# Patient Record
Sex: FEMALE | Race: WHITE | NOT HISPANIC OR LATINO | Employment: OTHER | URBAN - METROPOLITAN AREA
[De-identification: names, ages, dates, MRNs, and addresses within clinical notes are randomized per-mention and may not be internally consistent; named-entity substitution may affect disease eponyms.]

---

## 2017-08-15 ENCOUNTER — HOSPITAL ENCOUNTER (OUTPATIENT)
Dept: RADIOLOGY | Facility: HOSPITAL | Age: 77
Discharge: HOME | End: 2017-08-15
Attending: INTERNAL MEDICINE
Payer: COMMERCIAL

## 2017-08-15 ENCOUNTER — TRANSCRIBE ORDERS (OUTPATIENT)
Dept: ADMINISTRATIVE | Facility: HOSPITAL | Age: 77
End: 2017-08-15

## 2017-08-15 DIAGNOSIS — T14.90XA TRAUMA: Primary | ICD-10-CM

## 2017-08-15 DIAGNOSIS — T14.90XA TRAUMA: ICD-10-CM

## 2017-08-15 PROCEDURE — 70140 X-RAY EXAM OF FACIAL BONES: CPT

## 2017-08-15 PROCEDURE — 70210 X-RAY EXAM OF SINUSES: CPT

## 2021-08-22 ENCOUNTER — HOSPITAL ENCOUNTER (EMERGENCY)
Facility: HOSPITAL | Age: 81
Discharge: HOME | End: 2021-08-22
Attending: EMERGENCY MEDICINE | Admitting: EMERGENCY MEDICINE
Payer: COMMERCIAL

## 2021-08-22 VITALS
HEART RATE: 123 BPM | WEIGHT: 123 LBS | RESPIRATION RATE: 20 BRPM | DIASTOLIC BLOOD PRESSURE: 100 MMHG | OXYGEN SATURATION: 96 % | TEMPERATURE: 97.5 F | SYSTOLIC BLOOD PRESSURE: 142 MMHG

## 2021-08-22 DIAGNOSIS — S31.801A LACERATION OF BUTTOCK: Primary | ICD-10-CM

## 2021-08-22 PROCEDURE — 90715 TDAP VACCINE 7 YRS/> IM: CPT | Performed by: EMERGENCY MEDICINE

## 2021-08-22 PROCEDURE — 99283 EMERGENCY DEPT VISIT LOW MDM: CPT

## 2021-08-22 PROCEDURE — 12001 RPR S/N/AX/GEN/TRNK 2.5CM/<: CPT | Performed by: EMERGENCY MEDICINE

## 2021-08-22 PROCEDURE — 90471 IMMUNIZATION ADMIN: CPT

## 2021-08-22 PROCEDURE — 99284 EMERGENCY DEPT VISIT MOD MDM: CPT | Performed by: EMERGENCY MEDICINE

## 2021-08-22 RX ORDER — LIDOCAINE HYDROCHLORIDE 10 MG/ML
5 INJECTION, SOLUTION EPIDURAL; INFILTRATION; INTRACAUDAL; PERINEURAL ONCE
Status: COMPLETED | OUTPATIENT
Start: 2021-08-22 | End: 2021-08-22

## 2021-08-22 RX ORDER — DILTIAZEM HYDROCHLORIDE 180 MG/1
180 CAPSULE, EXTENDED RELEASE ORAL DAILY
COMMUNITY

## 2021-08-22 RX ORDER — METOPROLOL TARTRATE 50 MG/1
50 TABLET, FILM COATED ORAL EVERY 12 HOURS SCHEDULED
COMMUNITY

## 2021-08-22 RX ORDER — ATORVASTATIN CALCIUM 10 MG/1
10 TABLET, FILM COATED ORAL DAILY
COMMUNITY

## 2021-08-22 RX ADMIN — LIDOCAINE HYDROCHLORIDE 5 ML: 10 INJECTION, SOLUTION EPIDURAL; INFILTRATION; INTRACAUDAL at 22:30

## 2021-08-22 RX ADMIN — TETANUS TOXOID, REDUCED DIPHTHERIA TOXOID AND ACELLULAR PERTUSSIS VACCINE, ADSORBED 0.5 ML: 5; 2.5; 8; 8; 2.5 SUSPENSION INTRAMUSCULAR at 22:57

## 2021-08-23 NOTE — ED PROVIDER NOTES
History  Chief Complaint   Patient presents with    Laceration     states about 7pm accidentally brushed scabbed area on her back side, bleeding due to blood thinners     Pt presents with a laceration to her right buttock, which she sustained from dislodging a scab  Pt with persistent bleeding since 7p, she is on Xarelto for Afib  History provided by:  Patient  History limited by:  Acuity of condition   used: No    Laceration  Length:  1cm  Depth: Through dermis  Quality: straight    Bleeding: venous and controlled with pressure    Pain details:     Severity:  No pain    Timing:  Constant  Foreign body present:  No foreign bodies  Relieved by:  Nothing  Worsened by:  Nothing  Ineffective treatments:  None tried  Tetanus status:  Unknown  Associated symptoms: no fever        Prior to Admission Medications   Prescriptions Last Dose Informant Patient Reported? Taking?   atorvastatin (LIPITOR) 10 mg tablet   Yes Yes   Sig: Take 10 mg by mouth daily   diltiazem (DILACOR XR) 180 MG 24 hr capsule   Yes Yes   Sig: Take 180 mg by mouth daily   metoprolol tartrate (LOPRESSOR) 50 mg tablet   Yes Yes   Sig: Take 50 mg by mouth every 12 (twelve) hours   rivaroxaban (Xarelto) 15 mg tablet   Yes Yes   Sig: Take 15 mg by mouth daily with breakfast      Facility-Administered Medications: None       Past Medical History:   Diagnosis Date    [facility]     Hypercholesteremia        History reviewed  No pertinent surgical history  History reviewed  No pertinent family history  I have reviewed and agree with the history as documented      E-Cigarette/Vaping    E-Cigarette Use Never User      E-Cigarette/Vaping Substances     Social History     Tobacco Use    Smoking status: Former Smoker    Smokeless tobacco: Never Used   Vaping Use    Vaping Use: Never used   Substance Use Topics    Alcohol use: Yes     Comment: one glass wine a day    Drug use: Not Currently       Review of Systems Constitutional: Negative for chills and fever  Respiratory: Negative for cough, chest tightness and shortness of breath  Gastrointestinal: Negative for abdominal pain, diarrhea, nausea and vomiting  Genitourinary: Negative for dysuria, frequency, hematuria and urgency  Musculoskeletal: Negative for back pain, neck pain and neck stiffness  Skin: Positive for wound  All other systems reviewed and are negative  Physical Exam  Physical Exam  Vitals and nursing note reviewed  Constitutional:       General: She is not in acute distress  Appearance: She is well-developed  She is not diaphoretic  HENT:      Head: Normocephalic and atraumatic  Eyes:      Conjunctiva/sclera: Conjunctivae normal       Pupils: Pupils are equal, round, and reactive to light  Pulmonary:      Effort: Pulmonary effort is normal  No respiratory distress  Breath sounds: Normal breath sounds  Abdominal:      General: Bowel sounds are normal  There is no distension  Palpations: Abdomen is soft  Tenderness: There is no abdominal tenderness  Musculoskeletal:         General: No deformity  Normal range of motion  Cervical back: Normal range of motion and neck supple  Skin:     General: Skin is warm and dry  Capillary Refill: Capillary refill takes less than 2 seconds  Coloration: Skin is not pale  Findings: No rash  Neurological:      General: No focal deficit present  Mental Status: She is alert and oriented to person, place, and time  Cranial Nerves: No cranial nerve deficit     Psychiatric:         Behavior: Behavior normal          Vital Signs  ED Triage Vitals [08/22/21 2127]   Temperature Pulse Respirations Blood Pressure SpO2   97 5 °F (36 4 °C) (!) 123 20 142/100 96 %      Temp Source Heart Rate Source Patient Position - Orthostatic VS BP Location FiO2 (%)   Tympanic Monitor Sitting Right arm --      Pain Score       No Pain           Vitals:    08/22/21 2127   BP: 142/100   Pulse: (!) 123   Patient Position - Orthostatic VS: Sitting         Visual Acuity  Visual Acuity      Most Recent Value   L Pupil Size (mm)  3   R Pupil Size (mm)  3          ED Medications  Medications   lidocaine (PF) (XYLOCAINE-MPF) 1 % injection 5 mL (5 mL Infiltration Given 8/22/21 2230)   tetanus-diphtheria-acellular pertussis (BOOSTRIX) IM injection 0 5 mL (0 5 mL Intramuscular Given 8/22/21 2257)       Diagnostic Studies  Results Reviewed     None                 No orders to display              Procedures  Laceration repair    Date/Time: 8/22/2021 10:42 PM  Performed by: Dion Vega DO  Authorized by: Dion Vega DO   Consent: Verbal consent obtained  Risks and benefits: risks, benefits and alternatives were discussed  Consent given by: patient  Patient understanding: patient states understanding of the procedure being performed  Patient consent: the patient's understanding of the procedure matches consent given  Site marked: the operative site was marked  Required items: required blood products, implants, devices, and special equipment available  Patient identity confirmed: verbally with patient  Body area: lower extremity  Location details: right buttock  Laceration length: 1 cm  Foreign bodies: no foreign bodies  Tendon involvement: none  Nerve involvement: none  Vascular damage: no  Anesthesia: local infiltration    Anesthesia:  Local Anesthetic: lidocaine 1% without epinephrine  Anesthetic total: 2 mL    Sedation:  Patient sedated: no        Procedure Details:  Preparation: Patient was prepped and draped in the usual sterile fashion    Irrigation solution: saline  Irrigation method: tap  Amount of cleaning: standard  Debridement: none  Degree of undermining: none  Skin closure: 3-0 nylon  Number of sutures: 1  Technique: simple  Approximation: close  Approximation difficulty: simple  Dressing: pressure dressing  Patient tolerance: patient tolerated the procedure well with no immediate complications               ED Course                             SBIRT 20yo+      Most Recent Value   SBIRT (22 yo +)   In order to provide better care to our patients, we are screening all of our patients for alcohol and drug use  Would it be okay to ask you these screening questions? No Filed at: 08/22/2021 2309   DANIEL: How many times in the past year have you    Used an illegal drug or used a prescription medication for non-medical reasons? Never Filed at: 08/22/2021 2309                    MDM  Number of Diagnoses or Management Options  Laceration of buttock: new and requires workup  Risk of Complications, Morbidity, and/or Mortality  Presenting problems: high  Diagnostic procedures: high  Management options: high    Patient Progress  Patient progress: improved      Disposition  Final diagnoses:   Laceration of buttock     Time reflects when diagnosis was documented in both MDM as applicable and the Disposition within this note     Time User Action Codes Description Comment    8/22/2021 10:46 PM Arjun Brett Add [S31 801A] Laceration of buttock       ED Disposition     ED Disposition Condition Date/Time Comment    Discharge Stable Sun Aug 22, 2021 10:46 PM Oral Lemus discharge to home/self care  Follow-up Information     Follow up With Specialties Details Why Contact Kristi Rendon MD Internal Medicine Schedule an appointment as soon as possible for a visit in 2 days for follow up, For wound re-check, and for suture removal in 7-10 days [address]    [address] [place] [address]  [phone]            Discharge Medication List as of 8/22/2021 10:47 PM      CONTINUE these medications which have NOT CHANGED    Details   atorvastatin (LIPITOR) 10 mg tablet Take 10 mg by mouth daily, Historical Med      diltiazem (DILACOR XR) 180 MG 24 hr capsule Take 180 mg by mouth daily, Historical Med      metoprolol tartrate (LOPRESSOR) 50 mg tablet Take 50 mg by mouth every 12 (twelve) hours, Historical Med      rivaroxaban (Xarelto) 15 mg tablet Take 15 mg by mouth daily with breakfast, Historical Med           No discharge procedures on file      PDMP Review     None          ED Provider  Electronically Signed by           Sophia Hayes DO  08/25/21 6716

## 2021-08-23 NOTE — ED NOTES
Checked area in triage, sm open area on R buttocks that has a slow ooze of blood       Newton Lange RN  08/22/21 8286

## 2022-08-13 PROCEDURE — 99285 EMERGENCY DEPT VISIT HI MDM: CPT

## 2022-08-14 ENCOUNTER — APPOINTMENT (EMERGENCY)
Dept: RADIOLOGY | Facility: HOSPITAL | Age: 82
DRG: 291 | End: 2022-08-14
Payer: COMMERCIAL

## 2022-08-14 ENCOUNTER — HOSPITAL ENCOUNTER (INPATIENT)
Facility: HOSPITAL | Age: 82
LOS: 5 days | Discharge: HOME | DRG: 291 | End: 2022-08-19
Attending: EMERGENCY MEDICINE | Admitting: FAMILY MEDICINE
Payer: COMMERCIAL

## 2022-08-14 DIAGNOSIS — N17.9 AKI (ACUTE KIDNEY INJURY) (HCC): ICD-10-CM

## 2022-08-14 DIAGNOSIS — R60.9 PERIPHERAL EDEMA: ICD-10-CM

## 2022-08-14 DIAGNOSIS — I48.91 ATRIAL FIBRILLATION WITH RVR (HCC): ICD-10-CM

## 2022-08-14 DIAGNOSIS — I50.41 ACUTE COMBINED SYSTOLIC AND DIASTOLIC CONGESTIVE HEART FAILURE (HCC): ICD-10-CM

## 2022-08-14 DIAGNOSIS — I50.9 CHF (CONGESTIVE HEART FAILURE) (HCC): Primary | ICD-10-CM

## 2022-08-14 PROBLEM — R79.89 ELEVATED SERUM CREATININE: Status: ACTIVE | Noted: 2022-08-14

## 2022-08-14 PROBLEM — E78.5 HYPERLIPEMIA: Status: ACTIVE | Noted: 2022-08-14

## 2022-08-14 LAB
2HR DELTA HS TROPONIN: 0 NG/L
ALBUMIN SERPL BCP-MCNC: 3.9 G/DL (ref 3.5–5)
ALP SERPL-CCNC: 123 U/L (ref 46–116)
ALT SERPL W P-5'-P-CCNC: 15 U/L (ref 12–78)
ANION GAP SERPL CALCULATED.3IONS-SCNC: 12 MMOL/L (ref 4–13)
ANION GAP SERPL CALCULATED.3IONS-SCNC: 9 MMOL/L (ref 4–13)
APTT PPP: 31 SECONDS (ref 23–37)
AST SERPL W P-5'-P-CCNC: 21 U/L (ref 5–45)
BASOPHILS # BLD AUTO: 0.04 THOUSANDS/ΜL (ref 0–0.1)
BASOPHILS # BLD AUTO: 0.04 THOUSANDS/ΜL (ref 0–0.1)
BASOPHILS NFR BLD AUTO: 1 % (ref 0–1)
BASOPHILS NFR BLD AUTO: 1 % (ref 0–1)
BILIRUB SERPL-MCNC: 0.76 MG/DL (ref 0.2–1)
BUN SERPL-MCNC: 25 MG/DL (ref 5–25)
BUN SERPL-MCNC: 27 MG/DL (ref 5–25)
CALCIUM SERPL-MCNC: 9.1 MG/DL (ref 8.3–10.1)
CALCIUM SERPL-MCNC: 9.4 MG/DL (ref 8.3–10.1)
CARDIAC TROPONIN I PNL SERPL HS: 5 NG/L
CARDIAC TROPONIN I PNL SERPL HS: 5 NG/L
CHLORIDE SERPL-SCNC: 103 MMOL/L (ref 96–108)
CHLORIDE SERPL-SCNC: 103 MMOL/L (ref 96–108)
CO2 SERPL-SCNC: 27 MMOL/L (ref 21–32)
CO2 SERPL-SCNC: 30 MMOL/L (ref 21–32)
CREAT SERPL-MCNC: 1.3 MG/DL (ref 0.6–1.3)
CREAT SERPL-MCNC: 1.4 MG/DL (ref 0.6–1.3)
EOSINOPHIL # BLD AUTO: 0.03 THOUSAND/ΜL (ref 0–0.61)
EOSINOPHIL # BLD AUTO: 0.08 THOUSAND/ΜL (ref 0–0.61)
EOSINOPHIL NFR BLD AUTO: 1 % (ref 0–6)
EOSINOPHIL NFR BLD AUTO: 1 % (ref 0–6)
ERYTHROCYTE [DISTWIDTH] IN BLOOD BY AUTOMATED COUNT: 12.8 % (ref 11.6–15.1)
ERYTHROCYTE [DISTWIDTH] IN BLOOD BY AUTOMATED COUNT: 13 % (ref 11.6–15.1)
GFR SERPL CREATININE-BSD FRML MDRD: 35 ML/MIN/1.73SQ M
GFR SERPL CREATININE-BSD FRML MDRD: 38 ML/MIN/1.73SQ M
GLUCOSE SERPL-MCNC: 140 MG/DL (ref 65–140)
GLUCOSE SERPL-MCNC: 156 MG/DL (ref 65–140)
HCT VFR BLD AUTO: 41.8 % (ref 34.8–46.1)
HCT VFR BLD AUTO: 44.7 % (ref 34.8–46.1)
HGB BLD-MCNC: 13.7 G/DL (ref 11.5–15.4)
HGB BLD-MCNC: 14.5 G/DL (ref 11.5–15.4)
IMM GRANULOCYTES # BLD AUTO: 0.02 THOUSAND/UL (ref 0–0.2)
IMM GRANULOCYTES # BLD AUTO: 0.03 THOUSAND/UL (ref 0–0.2)
IMM GRANULOCYTES NFR BLD AUTO: 0 % (ref 0–2)
IMM GRANULOCYTES NFR BLD AUTO: 0 % (ref 0–2)
INR PPP: 2.03 (ref 0.84–1.19)
LYMPHOCYTES # BLD AUTO: 1.58 THOUSANDS/ΜL (ref 0.6–4.47)
LYMPHOCYTES # BLD AUTO: 1.71 THOUSANDS/ΜL (ref 0.6–4.47)
LYMPHOCYTES NFR BLD AUTO: 22 % (ref 14–44)
LYMPHOCYTES NFR BLD AUTO: 25 % (ref 14–44)
MCH RBC QN AUTO: 31 PG (ref 26.8–34.3)
MCH RBC QN AUTO: 31.2 PG (ref 26.8–34.3)
MCHC RBC AUTO-ENTMCNC: 32.4 G/DL (ref 31.4–37.4)
MCHC RBC AUTO-ENTMCNC: 32.8 G/DL (ref 31.4–37.4)
MCV RBC AUTO: 95 FL (ref 82–98)
MCV RBC AUTO: 96 FL (ref 82–98)
MONOCYTES # BLD AUTO: 0.48 THOUSAND/ΜL (ref 0.17–1.22)
MONOCYTES # BLD AUTO: 0.54 THOUSAND/ΜL (ref 0.17–1.22)
MONOCYTES NFR BLD AUTO: 7 % (ref 4–12)
MONOCYTES NFR BLD AUTO: 8 % (ref 4–12)
NEUTROPHILS # BLD AUTO: 4.26 THOUSANDS/ΜL (ref 1.85–7.62)
NEUTROPHILS # BLD AUTO: 5.22 THOUSANDS/ΜL (ref 1.85–7.62)
NEUTS SEG NFR BLD AUTO: 65 % (ref 43–75)
NEUTS SEG NFR BLD AUTO: 69 % (ref 43–75)
NRBC BLD AUTO-RTO: 0 /100 WBCS
NRBC BLD AUTO-RTO: 0 /100 WBCS
NT-PROBNP SERPL-MCNC: 2787 PG/ML
PLATELET # BLD AUTO: 284 THOUSANDS/UL (ref 149–390)
PLATELET # BLD AUTO: 313 THOUSANDS/UL (ref 149–390)
PMV BLD AUTO: 10 FL (ref 8.9–12.7)
PMV BLD AUTO: 9.5 FL (ref 8.9–12.7)
POTASSIUM SERPL-SCNC: 4 MMOL/L (ref 3.5–5.3)
POTASSIUM SERPL-SCNC: 4.2 MMOL/L (ref 3.5–5.3)
PROT SERPL-MCNC: 8.1 G/DL (ref 6.4–8.4)
PROTHROMBIN TIME: 23.1 SECONDS (ref 11.6–14.5)
RBC # BLD AUTO: 4.39 MILLION/UL (ref 3.81–5.12)
RBC # BLD AUTO: 4.68 MILLION/UL (ref 3.81–5.12)
SODIUM SERPL-SCNC: 142 MMOL/L (ref 135–147)
SODIUM SERPL-SCNC: 142 MMOL/L (ref 135–147)
WBC # BLD AUTO: 6.41 THOUSAND/UL (ref 4.31–10.16)
WBC # BLD AUTO: 7.62 THOUSAND/UL (ref 4.31–10.16)

## 2022-08-14 PROCEDURE — 85610 PROTHROMBIN TIME: CPT | Performed by: EMERGENCY MEDICINE

## 2022-08-14 PROCEDURE — 36415 COLL VENOUS BLD VENIPUNCTURE: CPT | Performed by: EMERGENCY MEDICINE

## 2022-08-14 PROCEDURE — 84484 ASSAY OF TROPONIN QUANT: CPT | Performed by: EMERGENCY MEDICINE

## 2022-08-14 PROCEDURE — 85730 THROMBOPLASTIN TIME PARTIAL: CPT | Performed by: EMERGENCY MEDICINE

## 2022-08-14 PROCEDURE — 71045 X-RAY EXAM CHEST 1 VIEW: CPT

## 2022-08-14 PROCEDURE — 85025 COMPLETE CBC W/AUTO DIFF WBC: CPT | Performed by: EMERGENCY MEDICINE

## 2022-08-14 PROCEDURE — 83880 ASSAY OF NATRIURETIC PEPTIDE: CPT | Performed by: EMERGENCY MEDICINE

## 2022-08-14 PROCEDURE — 80053 COMPREHEN METABOLIC PANEL: CPT | Performed by: EMERGENCY MEDICINE

## 2022-08-14 PROCEDURE — 99222 1ST HOSP IP/OBS MODERATE 55: CPT | Performed by: INTERNAL MEDICINE

## 2022-08-14 PROCEDURE — 99223 1ST HOSP IP/OBS HIGH 75: CPT | Performed by: INTERNAL MEDICINE

## 2022-08-14 PROCEDURE — 93005 ELECTROCARDIOGRAM TRACING: CPT

## 2022-08-14 PROCEDURE — 96374 THER/PROPH/DIAG INJ IV PUSH: CPT

## 2022-08-14 PROCEDURE — 85025 COMPLETE CBC W/AUTO DIFF WBC: CPT

## 2022-08-14 PROCEDURE — 80048 BASIC METABOLIC PNL TOTAL CA: CPT

## 2022-08-14 PROCEDURE — 99285 EMERGENCY DEPT VISIT HI MDM: CPT | Performed by: EMERGENCY MEDICINE

## 2022-08-14 RX ORDER — FUROSEMIDE 10 MG/ML
40 INJECTION INTRAMUSCULAR; INTRAVENOUS DAILY
Status: DISCONTINUED | OUTPATIENT
Start: 2022-08-14 | End: 2022-08-16

## 2022-08-14 RX ORDER — METOPROLOL TARTRATE 5 MG/5ML
10 INJECTION INTRAVENOUS ONCE
Status: COMPLETED | OUTPATIENT
Start: 2022-08-14 | End: 2022-08-14

## 2022-08-14 RX ORDER — METOPROLOL SUCCINATE 25 MG/1
12.5 TABLET, EXTENDED RELEASE ORAL DAILY
Status: DISCONTINUED | OUTPATIENT
Start: 2022-08-14 | End: 2022-08-15

## 2022-08-14 RX ORDER — DILTIAZEM HYDROCHLORIDE 5 MG/ML
15 INJECTION INTRAVENOUS ONCE
Status: COMPLETED | OUTPATIENT
Start: 2022-08-14 | End: 2022-08-14

## 2022-08-14 RX ORDER — ATORVASTATIN CALCIUM 10 MG/1
10 TABLET, FILM COATED ORAL DAILY
Status: DISCONTINUED | OUTPATIENT
Start: 2022-08-14 | End: 2022-08-19 | Stop reason: HOSPADM

## 2022-08-14 RX ORDER — ONDANSETRON 2 MG/ML
4 INJECTION INTRAMUSCULAR; INTRAVENOUS EVERY 6 HOURS PRN
Status: DISCONTINUED | OUTPATIENT
Start: 2022-08-14 | End: 2022-08-19 | Stop reason: HOSPADM

## 2022-08-14 RX ORDER — METOPROLOL SUCCINATE 25 MG/1
25 TABLET, EXTENDED RELEASE ORAL DAILY
Status: DISCONTINUED | OUTPATIENT
Start: 2022-08-14 | End: 2022-08-14

## 2022-08-14 RX ORDER — METOPROLOL SUCCINATE 25 MG/1
12.5 TABLET, EXTENDED RELEASE ORAL DAILY
Status: DISCONTINUED | OUTPATIENT
Start: 2022-08-15 | End: 2022-08-14

## 2022-08-14 RX ORDER — ACETAMINOPHEN 325 MG/1
650 TABLET ORAL EVERY 6 HOURS PRN
Status: DISCONTINUED | OUTPATIENT
Start: 2022-08-14 | End: 2022-08-19 | Stop reason: HOSPADM

## 2022-08-14 RX ADMIN — DILTIAZEM HYDROCHLORIDE 15 MG/HR: 5 INJECTION, SOLUTION INTRAVENOUS at 10:12

## 2022-08-14 RX ADMIN — METOPROLOL SUCCINATE 12.5 MG: 25 TABLET, EXTENDED RELEASE ORAL at 14:23

## 2022-08-14 RX ADMIN — METOPROLOL TARTRATE 10 MG: 1 INJECTION, SOLUTION INTRAVENOUS at 02:59

## 2022-08-14 RX ADMIN — FUROSEMIDE 40 MG: 10 INJECTION, SOLUTION INTRAMUSCULAR; INTRAVENOUS at 06:34

## 2022-08-14 RX ADMIN — DILTIAZEM HYDROCHLORIDE 15 MG: 5 INJECTION INTRAVENOUS at 00:41

## 2022-08-14 RX ADMIN — RIVAROXABAN 15 MG: 15 TABLET, FILM COATED ORAL at 17:02

## 2022-08-14 RX ADMIN — DILTIAZEM HYDROCHLORIDE 2.5 MG/HR: 5 INJECTION INTRAVENOUS at 01:45

## 2022-08-14 RX ADMIN — ATORVASTATIN CALCIUM 10 MG: 10 TABLET, FILM COATED ORAL at 08:35

## 2022-08-14 NOTE — PLAN OF CARE
Problem: PAIN - ADULT  Goal: Verbalizes/displays adequate comfort level or baseline comfort level  Description: Interventions:  - Encourage patient to monitor pain and request assistance  - Assess pain using appropriate pain scale  - Administer analgesics based on type and severity of pain and evaluate response  - Implement non-pharmacological measures as appropriate and evaluate response  - Consider cultural and social influences on pain and pain management  - Notify physician/advanced practitioner if interventions unsuccessful or patient reports new pain  Outcome: Progressing     Problem: INFECTION - ADULT  Goal: Absence or prevention of progression during hospitalization  Description: INTERVENTIONS:  - Assess and monitor for signs and symptoms of infection  - Monitor lab/diagnostic results  - Monitor all insertion sites, i e  indwelling lines, tubes, and drains  - Monitor endotracheal if appropriate and nasal secretions for changes in amount and color  - Sidell appropriate cooling/warming therapies per order  - Administer medications as ordered  - Instruct and encourage patient and family to use good hand hygiene technique  - Identify and instruct in appropriate isolation precautions for identified infection/condition  Outcome: Progressing  Goal: Absence of fever/infection during neutropenic period  Description: INTERVENTIONS:  - Monitor WBC    Outcome: Progressing     Problem: SAFETY ADULT  Goal: Patient will remain free of falls  Description: INTERVENTIONS:  - Educate patient/family on patient safety including physical limitations  - Instruct patient to call for assistance with activity   - Consult OT/PT to assist with strengthening/mobility   - Keep Call bell within reach  - Keep bed low and locked with side rails adjusted as appropriate  - Keep care items and personal belongings within reach  - Initiate and maintain comfort rounds  - Make Fall Risk Sign visible to staff  - Offer Toileting every 2 Hours, in advance of need  - Initiate/Maintain bed alarm  - Obtain necessary fall risk management equipment  - Apply yellow socks and bracelet for high fall risk patients  - Consider moving patient to room near nurses station  Outcome: Progressing  Goal: Maintain or return to baseline ADL function  Description: INTERVENTIONS:  -  Assess patient's ability to carry out ADLs; assess patient's baseline for ADL function and identify physical deficits which impact ability to perform ADLs (bathing, care of mouth/teeth, toileting, grooming, dressing, etc )  - Assess/evaluate cause of self-care deficits   - Assess range of motion  - Assess patient's mobility; develop plan if impaired  - Assess patient's need for assistive devices and provide as appropriate  - Encourage maximum independence but intervene and supervise when necessary  - Involve family in performance of ADLs  - Assess for home care needs following discharge   - Consider OT consult to assist with ADL evaluation and planning for discharge  - Provide patient education as appropriate  Outcome: Progressing  Goal: Maintains/Returns to pre admission functional level  Description: INTERVENTIONS:  - Perform BMAT or MOVE assessment daily    - Set and communicate daily mobility goal to care team and patient/family/caregiver  - Collaborate with rehabilitation services on mobility goals if consulted  - Perform Range of Motion 2 times a day  - Reposition patient every 2 hours    - Dangle patient 2 times a day  - Stand patient 2 times a day  - Ambulate patient 2 times a day  - Out of bed to chair 2 times a day   - Out of bed for meals 2 times a day  - Out of bed for toileting  - Record patient progress and toleration of activity level   Outcome: Progressing     Problem: DISCHARGE PLANNING  Goal: Discharge to home or other facility with appropriate resources  Description: INTERVENTIONS:  - Identify barriers to discharge w/patient and caregiver  - Arrange for needed discharge resources and transportation as appropriate  - Identify discharge learning needs (meds, wound care, etc )  - Arrange for interpretive services to assist at discharge as needed  - Refer to Case Management Department for coordinating discharge planning if the patient needs post-hospital services based on physician/advanced practitioner order or complex needs related to functional status, cognitive ability, or social support system  Outcome: Progressing     Problem: Knowledge Deficit  Goal: Patient/family/caregiver demonstrates understanding of disease process, treatment plan, medications, and discharge instructions  Description: Complete learning assessment and assess knowledge base    Interventions:  - Provide teaching at level of understanding  - Provide teaching via preferred learning methods  Outcome: Progressing

## 2022-08-14 NOTE — CONSULTS
Consultation - Cardiology   [facility]     Андрей Felix 80 y o  female MRN: [id number]  : 1940  Unit/Bed#: [address] [place] [address] Encounter: [id number]      ASSESSMENT:  Acute on chronic, combined systolic and diastolic heart failure  Presented with increased lower extremity edema  Followed by Dr Richar Luevano  Last ejection fraction was 30-35%  Nonischemic dilated cardiomyopathy    Chronic atrial fibrillation  Hyperlipidemia  JUANITA        RECOMMENDATIONS:    Continue Xarelto and diuretic  Toprol XL  Monitor weight, I/O and electrolytes  Echocardiogram  Will wean off IV Cardizem when heart rate is better controlled        Thank you for consulting me in the management and care of this patient  Physician Requesting Consult: Stephany Proctor MD    Reason for Consult / Principal Problem:  Acute on chronic heart failure    Inpatient consult to Cardiology  Consult performed by: Lanny Samuels MD  Consult ordered by: Stephany Proctor MD          HPI: Андрей Felix is a 80y o  year old female who presents with progressively increasing lower extremity swelling over the last 2 weeks  Her PCP prescribed 12 5 mg of hydrochlorothiazide without any improvement  Today after diuresis her edema has significantly decreased  She denied any chest pain, increased dyspnea or syncope    Review of Systems   Cardiovascular: Positive for leg swelling  All other systems reviewed and are negative        Historical Information   Past Medical History:   Diagnosis Date    Atrial fibrillation ([address] )     Hypercholesteremia      Past Surgical History:   Procedure Laterality Date    EMBOLIZATION      HERNIA REPAIR       Social History     Substance and Sexual Activity   Alcohol Use Yes    Alcohol/week: 7 0 standard drinks    Types: 7 Standard drinks or equivalent per week    Comment: one glass wine a day     Social History     Substance and Sexual Activity   Drug Use Not Currently     Social History     Tobacco Use Smoking Status Former Smoker   Smokeless Tobacco Never Used     Family History: History reviewed  No pertinent family history  Meds/Allergies    PTA meds:    Medications Prior to Admission   Medication    atorvastatin (LIPITOR) 10 mg tablet    diltiazem (DILACOR XR) 180 MG 24 hr capsule    FUROSEMIDE PO    metoprolol tartrate (LOPRESSOR) 50 mg tablet    rivaroxaban (XARELTO) 15 mg tablet      No Known Allergies    Current Facility-Administered Medications:     acetaminophen (TYLENOL) tablet 650 mg, 650 mg, Oral, Q6H PRN, Amelia Vecellio, PA-C    atorvastatin (LIPITOR) tablet 10 mg, 10 mg, Oral, Daily, Amelia Vecellio, PA-C, 10 mg at 08/14/22 0835    diltiazem (CARDIZEM) 125 mg in sodium chloride 0 9 % 125 mL infusion, 15 mg/hr, Intravenous, Titrated, Amelia Vecellio, PA-C, Last Rate: 15 mL/hr at 08/14/22 1012, 15 mg/hr at 08/14/22 1012    furosemide (LASIX) injection 40 mg, 40 mg, Intravenous, Daily, Amelia Vecellio, PA-C, 40 mg at 08/14/22 0634    ondansetron (ZOFRAN) injection 4 mg, 4 mg, Intravenous, Q6H PRN, Jeramie Gillis PA-C    rivaroxaban (XARELTO) tablet 15 mg, 15 mg, Oral, QPM, Amelia Vecellio, PA-C        Objective:   Vitals: Blood pressure 119/72, pulse (!) 120, temperature 97 5 °F (36 4 °C), resp  rate 18, height 4' 11" (1 499 m), weight 53 3 kg (117 lb 6 4 oz), SpO2 95 %  Body mass index is 23 71 kg/m²    BP Readings from Last 3 Encounters:   08/14/22 119/72   08/22/21 142/100     Orthostatic Blood Pressures    Flowsheet Row Most Recent Value   Blood Pressure 119/72 filed at 08/14/2022 9339   Patient Position - Orthostatic VS Lying filed at 08/14/2022 0200          Intake/Output Summary (Last 24 hours) at 8/14/2022 1047  Last data filed at 8/14/2022 0758  Gross per 24 hour   Intake 49 75 ml   Output 1250 ml   Net -1200 25 ml       Invasive Devices  Report    Peripheral Intravenous Line  Duration           Peripheral IV 08/14/22 Left Antecubital <1 day    Peripheral IV 08/14/22 Left Forearm <1 day                  Physical Exam:     Neurologic:  Alert & oriented x 3, no new focal deficits, Not in any acute distress,  Constitutional:  Well developed, well nourished, non-toxic appearance   Eyes:  Pupil equal and reacting to light, conjunctiva normal   HENT:  Atraumatic, oropharynx moist, Neck- normal range of motion, no tenderness, supple   Respiratory:  Bilateral air entry, mostly clear to auscultation  Cardiovascular: S1-S2 regular with a 1/6 ejection systolic murmur  GI:  Soft, nondistended, normal bowel sounds, nontender, no hepatosplenomegaly appreciated  Musculoskeletal:   no tenderness, no deformities     Skin:  Well hydrated, no rash   Lymphatic:  No lymphadenopathy noted   Extremities:  Mild edema and distal pulses are present    Labs:   Troponins:       CBC with diff:   Results from last 7 days   Lab Units 08/14/22  0606 08/14/22  0027   WBC Thousand/uL 6 41 7 62   HEMOGLOBIN g/dL 13 7 14 5   HEMATOCRIT % 41 8 44 7   MCV fL 95 96   PLATELETS Thousands/uL 284 313   MCH pg 31 2 31 0   MCHC g/dL 32 8 32 4   RDW % 12 8 13 0   MPV fL 9 5 10 0   NRBC AUTO /100 WBCs 0 0       CMP:   Results from last 7 days   Lab Units 08/14/22  0606 08/14/22  0027   SODIUM mmol/L 142 142   POTASSIUM mmol/L 4 2 4 0   CHLORIDE mmol/L 103 103   CO2 mmol/L 30 27   ANION GAP mmol/L 9 12   BUN mg/dL 25 27*   CREATININE mg/dL 1 30 1 40*   CALCIUM mg/dL 9 1 9 4   AST U/L  --  21   ALT U/L  --  15   ALK PHOS U/L  --  123*   TOTAL PROTEIN g/dL  --  8 1   ALBUMIN g/dL  --  3 9   TOTAL BILIRUBIN mg/dL  --  0 76   EGFR ml/min/1 73sq m 38 35   GLUCOSE RANDOM mg/dL 140 156*       Magnesium:     Coags:   Results from last 7 days   Lab Units 08/14/22  0027   PTT seconds 31   INR  2 03*     TSH:      No components found for: TSH3  Lipid Profile:     Lipid Profile:   Lab Results   Component Value Date    HDL 42 11/07/2015    LDLCALC 76 11/07/2015    TRIG 103 11/07/2015     Hgb A1c:     NT-proBNP:   Recent Labs 08/14/22  0027   NTBNP 2,787*        Imaging & Testing   Cardiac testing:   No results found for this or any previous visit  Imaging:   XR chest 1 view portable    Result Date: 8/14/2022  Narrative: CHEST INDICATION:   swelling  COMPARISON:  Chest radiograph from 11/6/2015  EXAM PERFORMED/VIEWS:  XR CHEST PORTABLE FINDINGS: Cardiomediastinal silhouette appears unremarkable  The lungs are clear  No pneumothorax or pleural effusion  Osseous structures appear within normal limits for patient age  Impression: No acute cardiopulmonary disease  Workstation performed: [id number]     EKG/ Monitor: Personally reviewed  Atrial fibrillation with RVR, ventricular rate 127 per minute  Nonspecific ST and T-wave abnormality     Medications/ Allergies:     Current Facility-Administered Medications   Medication Dose Route Frequency Provider Last Rate    acetaminophen  650 mg Oral Q6H PRN Jeramie Ream, PA-C      atorvastatin  10 mg Oral Daily Jeramie Ream, PA-C      diltiazem  15 mg/hr Intravenous Titrated Amelia Vecellio, PA-C 15 mg/hr (08/14/22 1012)    furosemide  40 mg Intravenous Daily Amelia Vecellio, PA-C      ondansetron  4 mg Intravenous Q6H PRN Jeramie Ream, PA-C      rivaroxaban  15 mg Oral QPM Amelia Vecellio, PA-C       acetaminophen, 650 mg, Q6H PRN  ondansetron, 4 mg, Q6H PRN      No Known Allergies    Code Status: Level 3 - DNAR and DNI  Advance Directive and Living Will:      POLST:        Carlos Macias MD, [facility] [place]      "This note has been constructed using a voice recognition system  Therefore there may be syntax, spelling, and/or grammatical errors   Please call if you have any questions  "

## 2022-08-14 NOTE — ASSESSMENT & PLAN NOTE
Wt Readings from Last 3 Encounters:   08/14/22 53 kg (116 lb 12 8 oz)   08/22/21 55 8 kg (123 lb)     Patient presents with worsening edema x2 weeks,   · Started on HCTZ 12 5mg by PCP last week without improvement   · BNP 2787  · Troponin trend negative  Echo ordered  Start lasix 40mg IV daily  Elevate legs  Daily weights, I/Os  Fluid, sodium restriction

## 2022-08-14 NOTE — ASSESSMENT & PLAN NOTE
Noted to be in a fib RVR on arrival, HR between 110-150s  · Given metoprolol 10mg in ED, cardizem bolus  · Controlled on Cardizem drip 15mg/hr  · Continue xarelto  · Cardiology consult

## 2022-08-14 NOTE — ASSESSMENT & PLAN NOTE
Creatine 1 4, baseline 1 0-1 3   Monitor closely while on lasix   Avoid nephrotoxins   Monitor with BMP

## 2022-08-14 NOTE — H&P
[address]  H&P- Larita Blizzard 1940, 80 y o  female MRN: [id number]  Unit/Bed#: [address] [place] [address] Encounter: [id number]  Primary Care Provider: Lyn Sheets MD   Date and time admitted to hospital: 8/14/2022 12:04 AM    * New onset of congestive heart failure ([facility] )  Assessment & Plan  Wt Readings from Last 3 Encounters:   08/14/22 53 kg (116 lb 12 8 oz)   08/22/21 55 8 kg (123 lb)     Patient presents with worsening edema x2 weeks,   · Started on HCTZ 12 5mg by PCP last week without improvement   · BNP 2787  · Troponin trend negative  Echo ordered  Start lasix 40mg IV daily  Elevate legs  Daily weights, I/Os  Fluid, sodium restriction        Atrial fibrillation with RVR ([facility] )  Assessment & Plan  Noted to be in a fib RVR on arrival, HR between 110-150s  · Given metoprolol 10mg in ED, cardizem bolus  · Controlled on Cardizem drip 15mg/hr  · Continue xarelto  · Cardiology consult    Elevated serum creatinine  Assessment & Plan  Creatine 1 4, baseline 1 0-1 3   Monitor closely while on lasix   Avoid nephrotoxins   Monitor with BMP      Hyperlipemia  Assessment & Plan  Continue atorvastatin     VTE Pharmacologic Prophylaxis: VTE Score: 4 Moderate Risk (Score 3-4) - Pharmacological DVT Prophylaxis Ordered: rivaroxaban (Xarelto)  Code Status: Full code  Discussion with family: Updated  (friend) at bedside  Anticipated Length of Stay: Patient will be admitted on an inpatient basis with an anticipated length of stay of greater than 2 midnights secondary to chf, a fib rvr  Total Time for Visit, including Counseling / Coordination of Care: 45 minutes Greater than 50% of this total time spent on direct patient counseling and coordination of care  Chief Complaint: leg swelling    History of Present Illness:  Larita Blizzard is a 80 y o  female with a PMH of a fib on xarelto, hypertension who presents with leg swelling   Patient states she has had worsening leg swelling for the last 2 weeks  She saw her PCP and was started on HCTZ 12 5mg daily  She states that her legs have gotten progressively more swollen and now up to her knees  Pt states she has had some weeping from her legs  Pt follows with [facility] cardiology but hasn't been there in more than a year  Denies any fevers, chills, chest pain, palpitations, SOB, cough, abdominal pain, constipation/diarrhea, dysuria, hematuria  Review of Systems:  Review of Systems   Constitutional: Negative for chills and fever  HENT: Negative for ear pain and sore throat  Eyes: Negative for pain and visual disturbance  Respiratory: Negative for cough and shortness of breath  Cardiovascular: Positive for leg swelling  Negative for chest pain and palpitations  Gastrointestinal: Negative for abdominal pain and vomiting  Genitourinary: Negative for dysuria and hematuria  Musculoskeletal: Negative for arthralgias and back pain  Skin: Negative for color change and rash  Neurological: Negative for seizures and syncope  All other systems reviewed and are negative  Past Medical and Surgical History:   Past Medical History:   Diagnosis Date    [facility] ([facility] )     Hypercholesteremia        History reviewed  No pertinent surgical history  Meds/Allergies:  Prior to Admission medications    Medication Sig Start Date End Date Taking? Authorizing Provider   atorvastatin (LIPITOR) 10 mg tablet Take 10 mg by mouth daily    Historical Provider, MD   diltiazem (DILACOR XR) 180 MG 24 hr capsule Take 180 mg by mouth daily    Historical Provider, MD   metoprolol tartrate (LOPRESSOR) 50 mg tablet Take 50 mg by mouth every 12 (twelve) hours    Historical Provider, MD   rivaroxaban (XARELTO) 15 mg tablet Take 15 mg by mouth every evening    Historical Provider, MD     I have reviewed home medications with patient personally  Allergies: No Known Allergies    Social History:  Marital Status:     Occupation: Patient Pre-hospital Living Situation: Home  Patient Pre-hospital Level of Mobility: walks  Patient Pre-hospital Diet Restrictions: none  Substance Use History:   Social History     Substance and Sexual Activity   Alcohol Use Yes    Comment: one glass wine a day     Social History     Tobacco Use   Smoking Status Former Smoker   Smokeless Tobacco Never Used     Social History     Substance and Sexual Activity   Drug Use Not Currently       Family History:  History reviewed  No pertinent family history  Physical Exam:     Vitals:   Blood Pressure: 132/89 (08/14/22 0513)  Pulse: 89 (08/14/22 0513)  Temperature: 98 1 °F (36 7 °C) (08/14/22 0513)  Temp Source: Oral (08/14/22 0012)  Respirations: 18 (08/14/22 0513)  Height: 4' 11" (149 9 cm) (08/14/22 0012)  Weight - Scale: 53 kg (116 lb 12 8 oz) (08/14/22 0012)  SpO2: 94 % (08/14/22 0513)    Physical Exam  Vitals reviewed  Constitutional:       General: She is not in acute distress  Appearance: She is well-developed  HENT:      Head: Normocephalic and atraumatic  Eyes:      Conjunctiva/sclera: Conjunctivae normal    Cardiovascular:      Rate and Rhythm: Tachycardia present  Rhythm irregular  Pulmonary:      Effort: Pulmonary effort is normal  No respiratory distress  Breath sounds: Normal breath sounds  Abdominal:      Palpations: Abdomen is soft  Tenderness: There is no abdominal tenderness  Musculoskeletal:      Right lower leg: Edema present  Left lower leg: Edema present  Comments: Pitting edema up to her knees bilaterally   Skin:     General: Skin is warm and dry  Neurological:      Mental Status: She is alert and oriented to person, place, and time           Additional Data:     Lab Results:  Results from last 7 days   Lab Units 08/14/22  0027   WBC Thousand/uL 7 62   HEMOGLOBIN g/dL 14 5   HEMATOCRIT % 44 7   PLATELETS Thousands/uL 313   NEUTROS PCT % 69   LYMPHS PCT % 22   MONOS PCT % 7   EOS PCT % 1     Results from last 7 days   Lab Units 08/14/22  0027   SODIUM mmol/L 142   POTASSIUM mmol/L 4 0   CHLORIDE mmol/L 103   CO2 mmol/L 27   BUN mg/dL 27*   CREATININE mg/dL 1 40*   ANION GAP mmol/L 12   CALCIUM mg/dL 9 4   ALBUMIN g/dL 3 9   TOTAL BILIRUBIN mg/dL 0 76   ALK PHOS U/L 123*   ALT U/L 15   AST U/L 21   GLUCOSE RANDOM mg/dL 156*     Results from last 7 days   Lab Units 08/14/22  0027   INR  2 03*                   Imaging: Personally reviewed the following imaging: chest xray  XR chest 1 view portable    (Results Pending)       EKG and Other Studies Reviewed on Admission:   · EKG: Atrial fibrillation    ** Please Note: This note has been constructed using a voice recognition system   **

## 2022-08-14 NOTE — ED PROVIDER NOTES
History  Chief Complaint   Patient presents with    Leg Swelling     Pt states that she has been having bilateral leg swelling for estimated 2 weeks  Pt states she f/up in the past two weeks with her PCP who put her medication to take fluid off her legs  Pt states since then her legs have been progressively getting bigger and is now "ozzing"  Pt denies any pain, SOB, numbness/tingling  Pt reports f/up appointment Monday with PCP  FLOYD    80-year-old female who presents today with bilateral lower extremity edema  Patient states she has been having this edema for 2 weeks she has all of the the PCP who put her on Lasix 20 mg which patient has been taking  States is not helping with the fluid  States now the water CBI relates  She has an appointment with her PCP on Monday  Denies any chest pain or shortness of breath  On arrival patient does have history of atrial fibrillation and currently in rapid ventricular rate  Denies having any sensation of palpitations  She does take Xarelto at home  79 yo female who presents today with lower extremity edema    Prior to Admission Medications   Prescriptions Last Dose Informant Patient Reported? Taking?    FUROSEMIDE PO 8/13/2022 at Unknown time Self Yes Yes   Sig: Take 12 5 mg by mouth daily   atorvastatin (LIPITOR) 10 mg tablet 8/13/2022 at Unknown time  Yes Yes   Sig: Take 10 mg by mouth daily   diltiazem (DILACOR XR) 180 MG 24 hr capsule 8/13/2022 at Unknown time  Yes Yes   Sig: Take 180 mg by mouth daily   metoprolol tartrate (LOPRESSOR) 50 mg tablet 8/13/2022 at Unknown time  Yes Yes   Sig: Take 50 mg by mouth every 12 (twelve) hours   rivaroxaban (XARELTO) 15 mg tablet 8/13/2022 at Unknown time  Yes Yes   Sig: Take 15 mg by mouth every evening      Facility-Administered Medications: None       Past Medical History:   Diagnosis Date    Atrial fibrillation ([address] )     Hypercholesteremia        Past Surgical History:   Procedure Laterality Date    EMBOLIZATION  HERNIA REPAIR         History reviewed  No pertinent family history  I have reviewed and agree with the history as documented  E-Cigarette/Vaping    E-Cigarette Use Never User      E-Cigarette/Vaping Substances     Social History     Tobacco Use    Smoking status: Former Smoker    Smokeless tobacco: Never Used   Vaping Use    Vaping Use: Never used   Substance Use Topics    Alcohol use: Yes     Alcohol/week: 7 0 standard drinks     Types: 7 Standard drinks or equivalent per week     Comment: one glass wine a day    Drug use: Not Currently       Review of Systems   Constitutional: Negative  Negative for diaphoresis and fever  HENT: Negative  Respiratory: Negative  Negative for cough, shortness of breath and wheezing  Cardiovascular: Positive for leg swelling  Negative for chest pain and palpitations  Gastrointestinal: Negative for abdominal distention, abdominal pain, nausea and vomiting  Genitourinary: Negative  Musculoskeletal: Negative  Skin: Negative  Neurological: Negative  Psychiatric/Behavioral: Negative  All other systems reviewed and are negative  Physical Exam  Physical Exam  Vitals and nursing note reviewed  Constitutional:       General: She is not in acute distress  Appearance: She is well-developed  HENT:      Head: Normocephalic and atraumatic  Eyes:      Conjunctiva/sclera: Conjunctivae normal    Cardiovascular:      Rate and Rhythm: Regular rhythm  Tachycardia present  Heart sounds: No murmur heard  Pulmonary:      Effort: Pulmonary effort is normal  No respiratory distress  Breath sounds: Normal breath sounds  Abdominal:      Palpations: Abdomen is soft  Tenderness: There is no abdominal tenderness  Musculoskeletal:      Cervical back: Neck supple  Right lower leg: Edema present  Left lower leg: Edema present  Comments: No signs of infection  Normal pulses    Skin:     General: Skin is warm and dry  Capillary Refill: Capillary refill takes less than 2 seconds  Neurological:      General: No focal deficit present  Mental Status: She is alert     Psychiatric:         Mood and Affect: Mood normal          Vital Signs  ED Triage Vitals [08/14/22 0012]   Temperature Pulse Respirations Blood Pressure SpO2   97 5 °F (36 4 °C) (S) (!) 144 18 144/90 95 %      Temp Source Heart Rate Source Patient Position - Orthostatic VS BP Location FiO2 (%)   Oral Monitor Sitting Left arm --      Pain Score       No Pain           Vitals:    08/14/22 1301 08/14/22 1420 08/14/22 1513 08/14/22 1820   BP: 106/56 105/56 106/57 104/61   Pulse: 68 65 73 82   Patient Position - Orthostatic VS:             Visual Acuity      ED Medications  Medications   atorvastatin (LIPITOR) tablet 10 mg (10 mg Oral Given 8/14/22 0835)   rivaroxaban (XARELTO) tablet 15 mg (15 mg Oral Given 8/14/22 1702)   acetaminophen (TYLENOL) tablet 650 mg (has no administration in time range)   ondansetron (ZOFRAN) injection 4 mg (has no administration in time range)   furosemide (LASIX) injection 40 mg (40 mg Intravenous Given 8/14/22 0634)   metoprolol succinate (TOPROL-XL) 24 hr tablet 12 5 mg (12 5 mg Oral Given 8/14/22 1423)   diltiazem (CARDIZEM) injection 15 mg (15 mg Intravenous Given 8/14/22 0041)   diltiazem (CARDIZEM) 125 mg in sodium chloride 0 9 % 125 mL infusion (0 mg/hr Intravenous Stopped 8/14/22 0441)   metoprolol (LOPRESSOR) injection 10 mg (10 mg Intravenous Given 8/14/22 0259)       Diagnostic Studies  Results Reviewed     Procedure Component Value Units Date/Time    HS Troponin I 2hr [[id number]]  (Normal) Collected: 08/14/22 0225    Lab Status: Final result Specimen: Blood from Arm, Left Updated: 08/14/22 0254     hs TnI 2hr 5 ng/L      Delta 2hr hsTnI 0 ng/L     NT-BNP PRO [[id number]]  (Abnormal) Collected: 08/14/22 0027    Lab Status: Final result Specimen: Blood from Arm, Left Updated: 08/14/22 0101     NT-proBNP 2,787 pg/mL     HS Troponin 0hr (reflex protocol) [[id number]]  (Normal) Collected: 08/14/22 0027    Lab Status: Final result Specimen: Blood from Arm, Left Updated: 08/14/22 0101     hs TnI 0hr 5 ng/L     Comprehensive metabolic panel [[id number]]  (Abnormal) Collected: 08/14/22 0027    Lab Status: Final result Specimen: Blood from Arm, Left Updated: 08/14/22 0055     Sodium 142 mmol/L      Potassium 4 0 mmol/L      Chloride 103 mmol/L      CO2 27 mmol/L      ANION GAP 12 mmol/L      BUN 27 mg/dL      Creatinine 1 40 mg/dL      Glucose 156 mg/dL      Calcium 9 4 mg/dL      AST 21 U/L      ALT 15 U/L      Alkaline Phosphatase 123 U/L      Total Protein 8 1 g/dL      Albumin 3 9 g/dL      Total Bilirubin 0 76 mg/dL      eGFR 35 ml/min/1 73sq m     Narrative:      [place] guidelines for Chronic Kidney Disease (CKD):     Stage 1 with normal or high GFR (GFR > 90 mL/min/1 73 square meters)    Stage 2 Mild CKD (GFR = 60-89 mL/min/1 73 square meters)    Stage 3A Moderate CKD (GFR = 45-59 mL/min/1 73 square meters)    Stage 3B Moderate CKD (GFR = 30-44 mL/min/1 73 square meters)    Stage 4 Severe CKD (GFR = 15-29 mL/min/1 73 square meters)    Stage 5 End Stage CKD (GFR <15 mL/min/1 73 square meters)  Note: GFR calculation is accurate only with a steady state creatinine    Protime-INR [[id number]]  (Abnormal) Collected: 08/14/22 0027    Lab Status: Final result Specimen: Blood from Arm, Left Updated: 08/14/22 0054     Protime 23 1 seconds      INR 2 03    APTT [[id number]]  (Normal) Collected: 08/14/22 0027    Lab Status: Final result Specimen: Blood from Arm, Left Updated: 08/14/22 0054     PTT 31 seconds     CBC and differential [[id number]] Collected: 08/14/22 0027    Lab Status: Final result Specimen: Blood from Arm, Left Updated: 08/14/22 0032     WBC 7 62 Thousand/uL      RBC 4 68 Million/uL      Hemoglobin 14 5 g/dL      Hematocrit 44 7 %      MCV 96 fL      MCH 31 0 pg      MCHC 32 4 g/dL      RDW 13 0 %      MPV 10 0 fL      Platelets 220 Thousands/uL      nRBC 0 /100 WBCs      Neutrophils Relative 69 %      Immat GRANS % 0 %      Lymphocytes Relative 22 %      Monocytes Relative 7 %      Eosinophils Relative 1 %      Basophils Relative 1 %      Neutrophils Absolute 5 22 Thousands/µL      Immature Grans Absolute 0 03 Thousand/uL      Lymphocytes Absolute 1 71 Thousands/µL      Monocytes Absolute 0 54 Thousand/µL      Eosinophils Absolute 0 08 Thousand/µL      Basophils Absolute 0 04 Thousands/µL                  XR chest 1 view portable   Final Result by Dalia Biggs MD (08/14 9518)      No acute cardiopulmonary disease                    Workstation performed: [id number]                    Procedures  Procedures         ED Course  ED Course as of 08/14/22 2113   Kale Ansari Aug 14, 2022   3903 Procedure Note: EKG  Date/Time: 08/14/22 12:37 AM   Performed by: Hermelindo Barajas by: Martín Sheth   Indications / Diagnosis: tachycardia  ECG reviewed by me, the ED Provider: yes   The EKG demonstrates:  Rhythm: irregular irregular   Intervals: normal intervals  Axis: normal axis  QRS/Blocks: normal QRS  ST Changes: No acute ST Changes, no STD/TREVOR                                                MDM    Disposition  Final diagnoses:   Peripheral edema   CHF (congestive heart failure) ([facility] )   Atrial fibrillation with RVR ([facility] )   JUANITA (acute kidney injury) ([facility] )     Time reflects when diagnosis was documented in both MDM as applicable and the Disposition within this note     Time User Action Codes Description Comment    8/14/2022  1:17 AM [place] [employer] [place] Add [R60 9] Peripheral edema     8/14/2022  1:17 AM [place] [employer] [place] Add [I50 9] CHF (congestive heart failure) ([facility] )     8/14/2022  1:18 AM [place] [employer] [place] Add [I48 91] Atrial fibrillation with RVR ([facility] )     8/14/2022  1:18 AM [place] [employer] Add [N17 9] JUANITA (acute kidney injury) [facility])       ED Disposition     ED Disposition   Admit    Condition   Stable    Date/Time   Sun Aug 14, 2022 1: 17 AM    Comment   Case was discussed with medicine and the patient's admission status was agreed to be Admission Status: inpatient status to the service of Dr Chinyere Fiore   Follow-up Information    None         Current Discharge Medication List      CONTINUE these medications which have NOT CHANGED    Details   atorvastatin (LIPITOR) 10 mg tablet Take 10 mg by mouth daily      diltiazem (DILACOR XR) 180 MG 24 hr capsule Take 180 mg by mouth daily      FUROSEMIDE PO Take 12 5 mg by mouth daily      metoprolol tartrate (LOPRESSOR) 50 mg tablet Take 50 mg by mouth every 12 (twelve) hours      rivaroxaban (XARELTO) 15 mg tablet Take 15 mg by mouth every evening             No discharge procedures on file      PDMP Review     None          ED Provider  Electronically Signed by           Layla Coello MD  08/14/22 7322

## 2022-08-15 ENCOUNTER — APPOINTMENT (INPATIENT)
Dept: NON INVASIVE DIAGNOSTICS | Facility: HOSPITAL | Age: 82
DRG: 291 | End: 2022-08-15
Payer: COMMERCIAL

## 2022-08-15 PROBLEM — N17.9 AKI (ACUTE KIDNEY INJURY) (HCC): Status: ACTIVE | Noted: 2022-08-14

## 2022-08-15 LAB
ALBUMIN SERPL BCP-MCNC: 2.9 G/DL (ref 3.5–5)
ALP SERPL-CCNC: 100 U/L (ref 46–116)
ALT SERPL W P-5'-P-CCNC: 13 U/L (ref 12–78)
ANION GAP SERPL CALCULATED.3IONS-SCNC: 7 MMOL/L (ref 4–13)
AORTIC ROOT: 3.1 CM
APICAL FOUR CHAMBER EJECTION FRACTION: 34 %
AST SERPL W P-5'-P-CCNC: 16 U/L (ref 5–45)
AV LVOT PEAK GRADIENT: 2 MMHG
AV PEAK GRADIENT: 6 MMHG
BASOPHILS # BLD AUTO: 0.04 THOUSANDS/ΜL (ref 0–0.1)
BASOPHILS NFR BLD AUTO: 1 % (ref 0–1)
BILIRUB SERPL-MCNC: 0.73 MG/DL (ref 0.2–1)
BUN SERPL-MCNC: 28 MG/DL (ref 5–25)
CALCIUM ALBUM COR SERPL-MCNC: 9.3 MG/DL (ref 8.3–10.1)
CALCIUM SERPL-MCNC: 8.4 MG/DL (ref 8.3–10.1)
CHLORIDE SERPL-SCNC: 105 MMOL/L (ref 96–108)
CO2 SERPL-SCNC: 33 MMOL/L (ref 21–32)
CREAT SERPL-MCNC: 1.44 MG/DL (ref 0.6–1.3)
DOP CALC LVOT AREA: 3.14 CM2
DOP CALC LVOT DIAMETER: 2 CM
DOP CALC MV VTI: 9.59 CM
EOSINOPHIL # BLD AUTO: 0.12 THOUSAND/ΜL (ref 0–0.61)
EOSINOPHIL NFR BLD AUTO: 2 % (ref 0–6)
ERYTHROCYTE [DISTWIDTH] IN BLOOD BY AUTOMATED COUNT: 12.8 % (ref 11.6–15.1)
FRACTIONAL SHORTENING: 11 % (ref 28–44)
GFR SERPL CREATININE-BSD FRML MDRD: 33 ML/MIN/1.73SQ M
GLUCOSE SERPL-MCNC: 114 MG/DL (ref 65–140)
HCT VFR BLD AUTO: 42.2 % (ref 34.8–46.1)
HGB BLD-MCNC: 13.6 G/DL (ref 11.5–15.4)
IMM GRANULOCYTES # BLD AUTO: 0.01 THOUSAND/UL (ref 0–0.2)
IMM GRANULOCYTES NFR BLD AUTO: 0 % (ref 0–2)
INTERVENTRICULAR SEPTUM IN DIASTOLE (PARASTERNAL SHORT AXIS VIEW): 0.9 CM
INTERVENTRICULAR SEPTUM: 0.9 CM (ref 0.6–1.1)
LAAS-AP2: 29.2 CM2
LAAS-AP4: 32.9 CM2
LEFT ATRIUM AREA SYSTOLE SINGLE PLANE A4C: 30.5 CM2
LEFT ATRIUM SIZE: 3.5 CM
LEFT INTERNAL DIMENSION IN SYSTOLE: 3.1 CM (ref 2.1–4)
LEFT VENTRICLE DIASTOLIC VOLUME (MOD BIPLANE): 87 ML
LEFT VENTRICLE SYSTOLIC VOLUME (MOD BIPLANE): 67 ML
LEFT VENTRICULAR INTERNAL DIMENSION IN DIASTOLE: 3.5 CM (ref 3.5–6)
LEFT VENTRICULAR POSTERIOR WALL IN END DIASTOLE: 0.8 CM
LEFT VENTRICULAR STROKE VOLUME: 15 ML
LV EF: 23 %
LVSV (TEICH): 15 ML
LYMPHOCYTES # BLD AUTO: 1.72 THOUSANDS/ΜL (ref 0.6–4.47)
LYMPHOCYTES NFR BLD AUTO: 33 % (ref 14–44)
MCH RBC QN AUTO: 31 PG (ref 26.8–34.3)
MCHC RBC AUTO-ENTMCNC: 32.2 G/DL (ref 31.4–37.4)
MCV RBC AUTO: 96 FL (ref 82–98)
MONOCYTES # BLD AUTO: 0.47 THOUSAND/ΜL (ref 0.17–1.22)
MONOCYTES NFR BLD AUTO: 9 % (ref 4–12)
MV E'TISSUE VEL-LAT: 6 CM/S
MV E'TISSUE VEL-SEP: 10 CM/S
MV MEAN GRADIENT: 1 MMHG
MV PEAK GRADIENT: 2 MMHG
NEUTROPHILS # BLD AUTO: 2.79 THOUSANDS/ΜL (ref 1.85–7.62)
NEUTS SEG NFR BLD AUTO: 55 % (ref 43–75)
NRBC BLD AUTO-RTO: 0 /100 WBCS
PLATELET # BLD AUTO: 281 THOUSANDS/UL (ref 149–390)
PMV BLD AUTO: 9.9 FL (ref 8.9–12.7)
POTASSIUM SERPL-SCNC: 3.8 MMOL/L (ref 3.5–5.3)
PROT SERPL-MCNC: 6.5 G/DL (ref 6.4–8.4)
PV PEAK GRADIENT: 1 MMHG
RBC # BLD AUTO: 4.39 MILLION/UL (ref 3.81–5.12)
RIGHT ATRIUM AREA SYSTOLE A4C: 17.4 CM2
RIGHT VENTRICLE ID DIMENSION: 3.1 CM
SL CV LEFT ATRIUM LENGTH A2C: 6.8 CM
SL CV PED ECHO LEFT VENTRICLE DIASTOLIC VOLUME (MOD BIPLANE) 2D: 52 ML
SL CV PED ECHO LEFT VENTRICLE SYSTOLIC VOLUME (MOD BIPLANE) 2D: 37 ML
SODIUM SERPL-SCNC: 145 MMOL/L (ref 135–147)
TR MAX PG: 31 MMHG
TR PEAK VELOCITY: 2.8 M/S
TRICUSPID VALVE PEAK REGURGITATION VELOCITY: 2.76 M/S
WBC # BLD AUTO: 5.15 THOUSAND/UL (ref 4.31–10.16)

## 2022-08-15 PROCEDURE — 99232 SBSQ HOSP IP/OBS MODERATE 35: CPT | Performed by: INTERNAL MEDICINE

## 2022-08-15 PROCEDURE — 85025 COMPLETE CBC W/AUTO DIFF WBC: CPT | Performed by: INTERNAL MEDICINE

## 2022-08-15 PROCEDURE — 80053 COMPREHEN METABOLIC PANEL: CPT | Performed by: INTERNAL MEDICINE

## 2022-08-15 PROCEDURE — 93306 TTE W/DOPPLER COMPLETE: CPT | Performed by: INTERNAL MEDICINE

## 2022-08-15 PROCEDURE — 93306 TTE W/DOPPLER COMPLETE: CPT

## 2022-08-15 RX ORDER — METOPROLOL SUCCINATE 25 MG/1
25 TABLET, EXTENDED RELEASE ORAL DAILY
Status: DISCONTINUED | OUTPATIENT
Start: 2022-08-15 | End: 2022-08-15

## 2022-08-15 RX ORDER — METOPROLOL TARTRATE 50 MG/1
50 TABLET, FILM COATED ORAL EVERY 12 HOURS SCHEDULED
Status: DISCONTINUED | OUTPATIENT
Start: 2022-08-15 | End: 2022-08-16

## 2022-08-15 RX ADMIN — METOPROLOL SUCCINATE 25 MG: 25 TABLET, EXTENDED RELEASE ORAL at 08:33

## 2022-08-15 RX ADMIN — AMIODARONE HYDROCHLORIDE 1 MG/MIN: 50 INJECTION, SOLUTION INTRAVENOUS at 16:01

## 2022-08-15 RX ADMIN — METOPROLOL TARTRATE 50 MG: 50 TABLET, FILM COATED ORAL at 22:06

## 2022-08-15 RX ADMIN — FUROSEMIDE 40 MG: 10 INJECTION, SOLUTION INTRAMUSCULAR; INTRAVENOUS at 08:33

## 2022-08-15 RX ADMIN — RIVAROXABAN 15 MG: 15 TABLET, FILM COATED ORAL at 17:02

## 2022-08-15 RX ADMIN — ATORVASTATIN CALCIUM 10 MG: 10 TABLET, FILM COATED ORAL at 08:33

## 2022-08-15 RX ADMIN — DEXTROSE 150 MG: 50 INJECTION, SOLUTION INTRAVENOUS at 14:57

## 2022-08-15 RX ADMIN — METOPROLOL TARTRATE 50 MG: 50 TABLET, FILM COATED ORAL at 12:29

## 2022-08-15 NOTE — ASSESSMENT & PLAN NOTE
Noted to be in a fib RVR on arrival, HR between 110-150s  · Given metoprolol 10mg in ED, cardizem bolus  · Controlled on Cardizem drip 15mg/hr  · Continue xarelto  · Cardiology consult    8/15 Patient taken off diltiazem gtt as hr dec to 55, now in the 130s and as high as 150 when I was in the room while rest  Discussed with cardiology they will adjust meds   Awaiting PT eval need to monitor hr during ambulation

## 2022-08-15 NOTE — ASSESSMENT & PLAN NOTE
Creatine 1 4, baseline 1 0-1 3   Monitor closely while on lasix   Avoid nephrotoxins   Monitor with BMP    8/15 Cr 1 4 today, continue to monitor closely

## 2022-08-15 NOTE — QUICK NOTE
Quick note  08/15/2022 1430    Patient remains in rapid atrial fibrillation  Echocardiogram performed today demonstrates EF of 25-30%, left atrium is severely dilated and right lead atrium is moderately dilated  At this time will start amiodarone drip for rate control with bolus of 150 mg followed by standard drip at mg per minute for 6 hours 0 5 milligrams/minute for 18  Will continue Lopressor    Continue monitor telemetry    [facility]

## 2022-08-15 NOTE — PLAN OF CARE
Problem: PAIN - ADULT  Goal: Verbalizes/displays adequate comfort level or baseline comfort level  Description: Interventions:  - Encourage patient to monitor pain and request assistance  - Assess pain using appropriate pain scale  - Administer analgesics based on type and severity of pain and evaluate response  - Implement non-pharmacological measures as appropriate and evaluate response  - Consider cultural and social influences on pain and pain management  - Notify physician/advanced practitioner if interventions unsuccessful or patient reports new pain  Outcome: Progressing     Problem: INFECTION - ADULT  Goal: Absence or prevention of progression during hospitalization  Description: INTERVENTIONS:  - Assess and monitor for signs and symptoms of infection  - Monitor lab/diagnostic results  - Monitor all insertion sites, i e  indwelling lines, tubes, and drains  - Monitor endotracheal if appropriate and nasal secretions for changes in amount and color  - [place] appropriate cooling/warming therapies per order  - Administer medications as ordered  - Instruct and encourage patient and family to use good hand hygiene technique  - Identify and instruct in appropriate isolation precautions for identified infection/condition  Outcome: Progressing  Goal: Absence of fever/infection during neutropenic period  Description: INTERVENTIONS:  - Monitor WBC    Outcome: Progressing     Problem: SAFETY ADULT  Goal: Patient will remain free of falls  Description: INTERVENTIONS:  - Educate patient/family on patient safety including physical limitations  - Instruct patient to call for assistance with activity   - Consult OT/PT to assist with strengthening/mobility   - Keep Call bell within reach  - Keep bed low and locked with side rails adjusted as appropriate  - Keep care items and personal belongings within reach  - Initiate and maintain comfort rounds  - Make Fall Risk Sign visible to staff  - Offer Toileting every 2 Hours, in advance of need  - Initiate/Maintain bed alarm  - Obtain necessary fall risk management equipment: yellow socks  - Apply yellow socks and bracelet for high fall risk patients  - Consider moving patient to room near nurses station  Outcome: Progressing  Goal: Maintain or return to baseline ADL function  Description: INTERVENTIONS:  -  Assess patient's ability to carry out ADLs; assess patient's baseline for ADL function and identify physical deficits which impact ability to perform ADLs (bathing, care of mouth/teeth, toileting, grooming, dressing, etc )  - Assess/evaluate cause of self-care deficits   - Assess range of motion  - Assess patient's mobility; develop plan if impaired  - Assess patient's need for assistive devices and provide as appropriate  - Encourage maximum independence but intervene and supervise when necessary  - Involve family in performance of ADLs  - Assess for home care needs following discharge   - Consider OT consult to assist with ADL evaluation and planning for discharge  - Provide patient education as appropriate  Outcome: Progressing  Goal: Maintains/Returns to pre admission functional level  Description: INTERVENTIONS:  - Perform BMAT or MOVE assessment daily    - Set and communicate daily mobility goal to care team and patient/family/caregiver  - Collaborate with rehabilitation services on mobility goals if consulted  - Perform Range of Motion 2 times a day  - Reposition patient every 2 hours    - Dangle patient 2 times a day  - Stand patient 2 times a day  - Ambulate patient 2 times a day  - Out of bed to chair 2 times a day   - Out of bed for meals 2 times a day  - Out of bed for toileting  - Record patient progress and toleration of activity level   Outcome: Progressing     Problem: DISCHARGE PLANNING  Goal: Discharge to home or other facility with appropriate resources  Description: INTERVENTIONS:  - Identify barriers to discharge w/patient and caregiver  - Arrange for needed discharge resources and transportation as appropriate  - Identify discharge learning needs (meds, wound care, etc )  - Arrange for interpretive services to assist at discharge as needed  - Refer to Case Management Department for coordinating discharge planning if the patient needs post-hospital services based on physician/advanced practitioner order or complex needs related to functional status, cognitive ability, or social support system  Outcome: Progressing     Problem: Knowledge Deficit  Goal: Patient/family/caregiver demonstrates understanding of disease process, treatment plan, medications, and discharge instructions  Description: Complete learning assessment and assess knowledge base    Interventions:  - Provide teaching at level of understanding  - Provide teaching via preferred learning methods  Outcome: Progressing     Problem: Potential for Falls  Goal: Patient will remain free of falls  Description: INTERVENTIONS:  - Educate patient/family on patient safety including physical limitations  - Instruct patient to call for assistance with activity   - Consult OT/PT to assist with strengthening/mobility   - Keep Call bell within reach  - Keep bed low and locked with side rails adjusted as appropriate  - Keep care items and personal belongings within reach  - Initiate and maintain comfort rounds  - Make Fall Risk Sign visible to staff  - Offer Toileting every 2 Hours, in advance of need  - Initiate/Maintain bed alarm  - Obtain necessary fall risk management equipment: yellow socks  - Apply yellow socks and bracelet for high fall risk patients  - Consider moving patient to room near nurses station  Outcome: Progressing

## 2022-08-15 NOTE — PROGRESS NOTES
Progress Note - Cardiology   [facility]     Quique Irving 80 y o  female MRN: [id number]  : 1940  Unit/Bed#: [address] 407[address] Encounter: [id number]    Assessment and Plan:   1  Acute on chronic combined systolic and diastolic heart failure:  Echo pending    -  continue Lasix 40 mg IV once day    -  close monitoring of I&O, daily weights electrolytes    -  will transition to long-acting beta-blocker to short-acting beta-blocker and increased for heart rate    2  Chronic atrial fibrillation:  Patient's heart rates still not well controlled    -  Cardizem drip yesterday was discontinued due to hypotension and bradycardia    -  will increase Lopressor 50 mg b i d     -  may need digoxin    -  continue Xarelto 50 mg daily    3  Dyslipidemia:  Continue Lipitor mg daily    4  Acute kidney injury on chronic kidney disease:  Baseline creatinine 1-1 3    -  creatinine today is 1 44    -  most likely due to rapid atrial fibrillation    Subjective / Objective:   Patient seen and examined  She remains in atrial fibrillation, which she states is chronic  Heart rate is elevated  Patient had been on Cardizem drip yesterday which was discontinued due to low heart rate and blood pressure  Will change long-acting beta-blocker short-acting beta-blocker for easier titration and increase it to 50 mg b i d     Vitals: Blood pressure 135/90, pulse (!) 131, temperature (!) 97 4 °F (36 3 °C), resp  rate 20, height 4' 11" (1 499 m), weight 51 3 kg (113 lb), SpO2 92 %  Vitals:    08/15/22 0430 08/15/22 0844   Weight: 51 3 kg (113 lb) 51 3 kg (113 lb)     Body mass index is 22 82 kg/m²    BP Readings from Last 3 Encounters:   08/15/22 135/90   21 142/100     Orthostatic Blood Pressures    Flowsheet Row Most Recent Value   Blood Pressure 135/90 filed at 08/15/2022 0844   Patient Position - Orthostatic VS Lying filed at 2022 0200        I/O        07 07 0701  08/15 0700 08/15 0701  08/16 0700    P  O   900     I V  (mL/kg) 49 8 (0 9)      Total Intake(mL/kg) 49 8 (0 9) 900 (17 5)     Urine (mL/kg/hr) 600 2000 (1 6) 300 (1 3)    Total Output 600 2000 300    Net -550 3 -1100 -300               Invasive Devices  Report    Peripheral Intravenous Line  Duration           Peripheral IV 08/14/22 Left Antecubital 1 day    Peripheral IV 08/14/22 Left Forearm 1 day                  Intake/Output Summary (Last 24 hours) at 8/15/2022 1134  Last data filed at 8/15/2022 1052  Gross per 24 hour   Intake 720 ml   Output 1650 ml   Net -930 ml         Physical Exam:   Physical Exam  Vitals and nursing note reviewed  Constitutional:       General: She is not in acute distress  Appearance: Normal appearance  She is normal weight  HENT:      Right Ear: External ear normal       Left Ear: External ear normal       Nose: Nose normal    Eyes:      General: No scleral icterus  Right eye: No discharge  Left eye: No discharge  Cardiovascular:      Rate and Rhythm: Tachycardia present  Rhythm irregular  Pulses: Normal pulses  Pulmonary:      Effort: Pulmonary effort is normal  No accessory muscle usage or respiratory distress  Breath sounds: Examination of the right-lower field reveals decreased breath sounds  Examination of the left-lower field reveals decreased breath sounds  Decreased breath sounds present  Abdominal:      General: There is no distension  Palpations: Abdomen is soft  Musculoskeletal:      Right lower leg: Edema present  Left lower leg: Edema present  Skin:     General: Skin is warm and dry  Capillary Refill: Capillary refill takes less than 2 seconds  Neurological:      General: No focal deficit present  Mental Status: She is alert and oriented to person, place, and time  Mental status is at baseline     Psychiatric:         Mood and Affect: Mood normal                  Medications/ Allergies:     Current Facility-Administered Medications   Medication Dose Route Frequency Provider Last Rate    acetaminophen  650 mg Oral Q6H PRN Tia Willams PA-C      atorvastatin  10 mg Oral Daily Tia Willams PA-C      furosemide  40 mg Intravenous Daily Tia Willams PA-C      metoprolol succinate  25 mg Oral Daily Madeleine James MD      ondansetron  4 mg Intravenous Q6H PRN Tia Willams PA-C      rivaroxaban  15 mg Oral QPM Ameliakanika Penn PA-C       acetaminophen, 650 mg, Q6H PRN  ondansetron, 4 mg, Q6H PRN      No Known Allergies    VTE Pharmacologic Prophylaxis:   Sequential compression device (Venodyne)     Labs:   Troponins:  Results from last 7 days   Lab Units 08/14/22  0225   HSTNI D2 ng/L 0     CBC with diff:  Results from last 7 days   Lab Units 08/15/22  0448 08/14/22  0606 08/14/22  0027   WBC Thousand/uL 5 15 6 41 7 62   HEMOGLOBIN g/dL 13 6 13 7 14 5   HEMATOCRIT % 42 2 41 8 44 7   MCV fL 96 95 96   PLATELETS Thousands/uL 281 284 313   MCH pg 31 0 31 2 31 0   MCHC g/dL 32 2 32 8 32 4   RDW % 12 8 12 8 13 0   MPV fL 9 9 9 5 10 0   NRBC AUTO /100 WBCs 0 0 0     CMP:  Results from last 7 days   Lab Units 08/15/22  0448 08/14/22  0606 08/14/22  0027   SODIUM mmol/L 145 142 142   POTASSIUM mmol/L 3 8 4 2 4 0   CHLORIDE mmol/L 105 103 103   CO2 mmol/L 33* 30 27   ANION GAP mmol/L 7 9 12   BUN mg/dL 28* 25 27*   CREATININE mg/dL 1 44* 1 30 1 40*   CALCIUM mg/dL 8 4 9 1 9 4   AST U/L 16  --  21   ALT U/L 13  --  15   ALK PHOS U/L 100  --  123*   TOTAL PROTEIN g/dL 6 5  --  8 1   ALBUMIN g/dL 2 9*  --  3 9   TOTAL BILIRUBIN mg/dL 0 73  --  0 76   EGFR ml/min/1 73sq m 33 38 35     Coags:  Results from last 7 days   Lab Units 08/14/22  0027   PTT seconds 31   INR  2 03*     NT-proBNP:   Recent Labs     08/14/22  0027   NTBNP 2,787*        Imaging & Testing   I have personally reviewed pertinent reports  XR chest 1 view portable    Result Date: 8/14/2022  Narrative: CHEST INDICATION:   swelling   COMPARISON:  Chest radiograph from 11/6/2015  EXAM PERFORMED/VIEWS:  XR CHEST PORTABLE FINDINGS: Cardiomediastinal silhouette appears unremarkable  The lungs are clear  No pneumothorax or pleural effusion  Osseous structures appear within normal limits for patient age  Impression: No acute cardiopulmonary disease  Workstation performed: [id number]     Echo complete w/ contrast if indicated    Result Date: 8/15/2022  Narrative: Mulu Husain  Left Ventricle: Left ventricular cavity size is normal  Wall thickness is normal  Systolic function is severely reduced  Estimated ejection fraction is 25-30%  Wall motion cannot be accurately assessed  Diastolic dysfunction, grade indeterminate due to underlying atrial fibrillation    Right Ventricle: Systolic function is reduced    Left Atrium: The atrium is severely dilated    Right Atrium: The atrium is moderately dilated    Mitral Valve: There is annular calcification  There is mild regurgitation    Tricuspid Valve: There is mild regurgitation  The right ventricular systolic pressure is mildly elevated  Cande Mims, [address] [facility]  Cardiology      "This note has been constructed using a voice recognition system  Therefore there may be syntax, spelling, and/or grammatical errors   Please call if you have any questions  "

## 2022-08-15 NOTE — UTILIZATION REVIEW
Initial Clinical Review    Admission: Date/Time/Statement:   Admission Orders (From admission, onward)     Ordered        08/14/22 0118  INPATIENT ADMISSION  Once                      Orders Placed This Encounter   Procedures    INPATIENT ADMISSION     Standing Status:   Standing     Number of Occurrences:   1     Order Specific Question:   Level of Care     Answer:   Med Surg [16]     Order Specific Question:   Estimated length of stay     Answer:   More than 2 Midnights     Order Specific Question:   Certification     Answer:   I certify that inpatient services are medically necessary for this patient for a duration of greater than two midnights  See H&P and MD Progress Notes for additional information about the patient's course of treatment  ED Arrival Information     Expected   -    Arrival   8/13/2022 23:55    Acuity   Urgent            Means of arrival   Walk-In    Escorted by   [place]    Service   Hospitalist    Admission type   Urgent            Arrival complaint   legs swollen           Chief Complaint   Patient presents with    Leg Swelling     Pt states that she has been having bilateral leg swelling for estimated 2 weeks  Pt states she f/up in the past two weeks with her PCP who put her medication to take fluid off her legs  Pt states since then her legs have been progressively getting bigger and is now "ozzing"  Pt denies any pain, SOB, numbness/tingling  Pt reports f/up appointment Monday with PCP  Initial Presentation:   80 yof to ER from home c/o BLE swelling x 2 weeks  Was to PCP & started on Lasix, however, edema worsening, now up to knees & weeping  Hx afib on Xarelto, HTN  Presents tachycardic with bibasilar crackles, BLE edema  Admission work-up showing Afib with RVR, JUANITA, elevated BNP  Admitted to inpatient status for acute on chronic CHF  Started on IV diuresis, cardio consulted  Per cardio: CHF  Last ejection fraction was 30-35%  Nonischemic dilated cardiomyopathy   Continue diuretic, schedule echo  IV Cardizem until HR improves  Date: 8/15/22   Day 2:   IV Cardizem d/c'd for bradycardia & hypotension, however, HR now back up to 130+  Cardio changed long-acting beta-blocker to short-acting beta-blocker with increase to 50mg Q12H  IV Lasix continued for CHF  Lungs with decreased breath sounds, persistent BLE edema  Scheduled for echo        ED Triage Vitals [08/14/22 0012]   Temperature Pulse Respirations Blood Pressure SpO2   97 5 °F (36 4 °C) (S) (!) 144 18 144/90 95 %      Temp Source Heart Rate Source Patient Position - Orthostatic VS BP Location FiO2 (%)   Oral Monitor Sitting Left arm --      Pain Score       No Pain          Wt Readings from Last 1 Encounters:   08/15/22 51 3 kg (113 lb)     Additional Vital Signs:   Date/Time Temp Pulse Resp BP MAP (mmHg) SpO2 O2 Device Patient Position - Orthostatic VS   08/15/22 0844 -- 131 Abnormal  -- 135/90 -- -- -- --   08/15/22 07:32:20 97 4 °F (36 3 °C) Abnormal  137 Abnormal  -- 136/89 105 92 % -- --   08/15/22 03:33:50 97 3 °F (36 3 °C) Abnormal  105 20 135/90 105 93 % -- --   08/15/22 00:03:01 97 3 °F (36 3 °C) Abnormal  97 20 142/92 -- 91 % -- --   08/14/22 1820 98 8 °F (37 1 °C) 82 18 104/61 75 94 % -- --   08/14/22 15:13:11 97 4 °F (36 3 °C) Abnormal  73 18 106/57 73 94 % -- --   08/14/22 14:20:19 -- 65 -- 105/56 72 94 % -- --   08/14/22 13:01:04 -- 68 -- 106/56 73 94 % -- --   08/14/22 11:37:30 -- 55 -- 96/56 69 95 % -- --   08/14/22 07:27:05 97 5 °F (36 4 °C) 120 Abnormal  18 119/72 88 95 % -- --   08/14/22 05:13:44 98 1 °F (36 7 °C) 89 18 132/89 103 94 % -- --   08/14/22 0430 -- 96 18 107/60 75 94 % -- --   08/14/22 0400 -- 92 18 128/61 79 93 % -- --   08/14/22 0345 -- 116 Abnormal  18 111/70 83 93 % -- --   08/14/22 0330 -- 112 Abnormal  18 114/70 86 96 % None (Room air) --   08/14/22 0315 -- 114 Abnormal  -- 127/77 94 95 % -- --   08/14/22 0300 -- 120 Abnormal  -- 114/80 93 95 % -- --   08/14/22 0245 -- 116 Abnormal  -- 122/79 96 94 % -- --   08/14/22 0230 -- 126 Abnormal  -- 122/80 97 95 % -- --   08/14/22 0215 -- 124 Abnormal  18 128/76 96 95 % -- --   08/14/22 0200 -- 124 Abnormal  18 140/71 100 96 % -- Lying     Pertinent Labs/Diagnostic Test Results:  8/15  Echo=     Left Ventricle: Left ventricular cavity size is normal  Wall thickness is normal  Systolic function is severely reduced  Estimated ejection fraction is 25-30%  Wall motion cannot be accurately assessed  Diastolic dysfunction, grade indeterminate due to underlying atrial fibrillation    Right Ventricle: Systolic function is reduced    Left Atrium: The atrium is severely dilated    Right Atrium: The atrium is moderately dilated    Mitral Valve: There is annular calcification  There is mild regurgitation    Tricuspid Valve: There is mild regurgitation  The right ventricular systolic pressure is mildly elevated  XR chest 1 view portable   Final Result  (08/14 3973)      No acute cardiopulmonary disease  8/14  Ekg=  Rhythm: irregular irregular   Intervals: normal intervals  Axis: normal axis  QRS/Blocks: normal QRS  ST Changes: No acute ST Changes, no STD/TREVOR      Results from last 7 days   Lab Units 08/15/22  0448 08/14/22  0606 08/14/22  0027   WBC Thousand/uL 5 15 6 41 7 62   HEMOGLOBIN g/dL 13 6 13 7 14 5   HEMATOCRIT % 42 2 41 8 44 7   PLATELETS Thousands/uL 281 284 313   NEUTROS ABS Thousands/µL 2 79 4 26 5 22     Results from last 7 days   Lab Units 08/15/22  0448 08/14/22  0606 08/14/22  0027   SODIUM mmol/L 145 142 142   POTASSIUM mmol/L 3 8 4 2 4 0   CHLORIDE mmol/L 105 103 103   CO2 mmol/L 33* 30 27   ANION GAP mmol/L 7 9 12   BUN mg/dL 28* 25 27*   CREATININE mg/dL 1 44* 1 30 1 40*   EGFR ml/min/1 73sq m 33 38 35   CALCIUM mg/dL 8 4 9 1 9 4     Results from last 7 days   Lab Units 08/15/22  0448 08/14/22  0027   AST U/L 16 21   ALT U/L 13 15   ALK PHOS U/L 100 123*   TOTAL PROTEIN g/dL 6 5 8 1   ALBUMIN g/dL 2 9* 3 9   TOTAL BILIRUBIN mg/dL 0 73 0 76     Results from last 7 days   Lab Units 08/15/22  0448 08/14/22  0606 08/14/22  0027   GLUCOSE RANDOM mg/dL 114 140 156*     Results from last 7 days   Lab Units 08/14/22  0225 08/14/22  0027   HS TNI 0HR ng/L  --  5   HS TNI 2HR ng/L 5  --    HSTNI D2 ng/L 0  --      Results from last 7 days   Lab Units 08/14/22  0027   PROTIME seconds 23 1*   INR  2 03*   PTT seconds 31     Results from last 7 days   Lab Units 08/14/22  0027   NT-PRO BNP pg/mL 2,787*     ED Treatment:   Medication Administration from 08/13/2022 2355 to 08/14/2022 0505       Date/Time Order Dose Route Action     08/14/2022 0041 diltiazem (CARDIZEM) injection 15 mg 15 mg Intravenous Given     08/14/2022 0400 diltiazem (CARDIZEM) 125 mg in sodium chloride 0 9 % 125 mL infusion 15 mg/hr Intravenous Rate/Dose Change     08/14/2022 0320 diltiazem (CARDIZEM) 125 mg in sodium chloride 0 9 % 125 mL infusion 12 5 mg/hr Intravenous Rate/Dose Change     08/14/2022 0240 diltiazem (CARDIZEM) 125 mg in sodium chloride 0 9 % 125 mL infusion 10 mg/hr Intravenous Rate/Dose Change     08/14/2022 0224 diltiazem (CARDIZEM) 125 mg in sodium chloride 0 9 % 125 mL infusion 7 5 mg/hr Intravenous Rate/Dose Change     08/14/2022 0203 diltiazem (CARDIZEM) 125 mg in sodium chloride 0 9 % 125 mL infusion 5 mg/hr Intravenous Rate/Dose Change     08/14/2022 0145 diltiazem (CARDIZEM) 125 mg in sodium chloride 0 9 % 125 mL infusion 2 5 mg/hr Intravenous New Bag     08/14/2022 0259 metoprolol (LOPRESSOR) injection 10 mg 10 mg Intravenous Given     08/14/2022 0441 diltiazem (CARDIZEM) 125 mg in sodium chloride 0 9 % 125 mL infusion 15 mg/hr Intravenous Restarted        Past Medical History:   Diagnosis Date    Atrial fibrillation ([facility] )     Hypercholesteremia      Admitting Diagnosis: CHF (congestive heart failure) ([facility] ) [I50 9]  Peripheral edema [R60 9]  JUANITA (acute kidney injury) ([facility] ) [N17 9]  Atrial fibrillation with RVR (HCC) [I48 91]  Pain and swelling of lower leg [id number], M79 89]  Age/Sex: 80 y o  female  Admission Orders:  1800cc fluid restriction  Consult cardio  Telemetry  Scd/foot pumps    Scheduled Medications:  atorvastatin, 10 mg, Oral, Daily  furosemide, 40 mg, Intravenous, Daily  metoprolol succinate, 25 mg, Oral, Daily>d/c'd  metoprolol tartrate (Lopressor) 50 mg Q12H  rivaroxaban, 15 mg, Oral, QPM    Continuous IV Infusions:  diltiazem (CARDIZEM) 125 mg in sodium chloride 0 9 % 125 mL infusion  Rate: 15 mL/hr Dose: 15 mg/hr  Start: 08/14/22 0445 End: 08/14/22 1411    PRN Meds:  acetaminophen, 650 mg, Oral, Q6H PRN  ondansetron, 4 mg, Intravenous, Q6H PRN      Network Utilization Review Department  ATTENTION: Please call with any questions or concerns to [phone] and carefully listen to the prompts so that you are directed to the right person  All voicemails are confidential   Amy Barbosa all requests for admission clinical reviews, approved or denied determinations and any other requests to dedicated fax number below belonging to the campus where the patient is receiving treatment   List of dedicated fax numbers for the Facilities:  [address] DENIALS (Administrative/Medical Necessity) [phone]   [address] (Maternity/NICU/Pediatrics) [phone]   [address]  [address] Medical [place] [address] [address] [place] [address] [address] [facility] [phone]

## 2022-08-15 NOTE — ASSESSMENT & PLAN NOTE
Wt Readings from Last 3 Encounters:   08/15/22 51 3 kg (113 lb)   08/22/21 55 8 kg (123 lb)     Patient presents with worsening edema x2 weeks,   · Started on HCTZ 12 5mg by PCP last week without improvement   · BNP 2787  · Troponin trend negative  Echo ordered  Start lasix 40mg IV daily  Elevate legs  Daily weights, I/Os  Fluid, sodium restriction    8/15 Continue lasix, Last ejection fraction was 30-35%, EF today is 25-30%, continue to follow closely with cardiology

## 2022-08-15 NOTE — PLAN OF CARE
Problem: PAIN - ADULT  Goal: Verbalizes/displays adequate comfort level or baseline comfort level  Description: Interventions:  - Encourage patient to monitor pain and request assistance  - Assess pain using appropriate pain scale  - Administer analgesics based on type and severity of pain and evaluate response  - Implement non-pharmacological measures as appropriate and evaluate response  - Consider cultural and social influences on pain and pain management  - Notify physician/advanced practitioner if interventions unsuccessful or patient reports new pain  Outcome: Progressing     Problem: INFECTION - ADULT  Goal: Absence or prevention of progression during hospitalization  Description: INTERVENTIONS:  - Assess and monitor for signs and symptoms of infection  - Monitor lab/diagnostic results  - Monitor all insertion sites, i e  indwelling lines, tubes, and drains  - Monitor endotracheal if appropriate and nasal secretions for changes in amount and color  - [place] appropriate cooling/warming therapies per order  - Administer medications as ordered  - Instruct and encourage patient and family to use good hand hygiene technique  - Identify and instruct in appropriate isolation precautions for identified infection/condition  Outcome: Progressing  Goal: Absence of fever/infection during neutropenic period  Description: INTERVENTIONS:  - Monitor WBC    Outcome: Progressing     Problem: SAFETY ADULT  Goal: Patient will remain free of falls  Description: INTERVENTIONS:  - Educate patient/family on patient safety including physical limitations  - Instruct patient to call for assistance with activity   - Consult OT/PT to assist with strengthening/mobility   - Keep Call bell within reach  - Keep bed low and locked with side rails adjusted as appropriate  - Keep care items and personal belongings within reach  - Initiate and maintain comfort rounds  - Make Fall Risk Sign visible to staff    - Apply yellow socks and bracelet for high fall risk patients  - Consider moving patient to room near nurses station  Outcome: Progressing  Goal: Maintain or return to baseline ADL function  Description: INTERVENTIONS:  -  Assess patient's ability to carry out ADLs; assess patient's baseline for ADL function and identify physical deficits which impact ability to perform ADLs (bathing, care of mouth/teeth, toileting, grooming, dressing, etc )  - Assess/evaluate cause of self-care deficits   - Assess range of motion  - Assess patient's mobility; develop plan if impaired  - Assess patient's need for assistive devices and provide as appropriate  - Encourage maximum independence but intervene and supervise when necessary  - Involve family in performance of ADLs  - Assess for home care needs following discharge   - Consider OT consult to assist with ADL evaluation and planning for discharge  - Provide patient education as appropriate  Outcome: Progressing  Goal: Maintains/Returns to pre admission functional level  Description: INTERVENTIONS:  - Perform BMAT or MOVE assessment daily    - Set and communicate daily mobility goal to care team and patient/family/caregiver     - Collaborate with rehabilitation services on mobility goals if consulted    - Out of bed for toileting  - Record patient progress and toleration of activity level   Outcome: Progressing     Problem: DISCHARGE PLANNING  Goal: Discharge to home or other facility with appropriate resources  Description: INTERVENTIONS:  - Identify barriers to discharge w/patient and caregiver  - Arrange for needed discharge resources and transportation as appropriate  - Identify discharge learning needs (meds, wound care, etc )  - Arrange for interpretive services to assist at discharge as needed  - Refer to Case Management Department for coordinating discharge planning if the patient needs post-hospital services based on physician/advanced practitioner order or complex needs related to functional status, cognitive ability, or social support system  Outcome: Progressing     Problem: Knowledge Deficit  Goal: Patient/family/caregiver demonstrates understanding of disease process, treatment plan, medications, and discharge instructions  Description: Complete learning assessment and assess knowledge base    Interventions:  - Provide teaching at level of understanding  - Provide teaching via preferred learning methods  Outcome: Progressing     Problem: Potential for Falls  Goal: Patient will remain free of falls  Description: INTERVENTIONS:  - Educate patient/family on patient safety including physical limitations  - Instruct patient to call for assistance with activity   - Consult OT/PT to assist with strengthening/mobility   - Keep Call bell within reach  - Keep bed low and locked with side rails adjusted as appropriate  - Keep care items and personal belongings within reach  - Initiate and maintain comfort rounds  - Make Fall Risk Sign visible to staff    - Apply yellow socks and bracelet for high fall risk patients  - Consider moving patient to room near nurses station  Outcome: Progressing

## 2022-08-15 NOTE — PROGRESS NOTES
[address] Internal Medicine Progress Note  Patient: Keith Blair 80 y o  female   MRN: [id number]  PCP: Arminda Cheema MD  Unit/Bed#: [address] [place] [address] Encounter: [id number]  Date Of Visit: 08/15/22    Problem List:    Principal Problem:    New onset of congestive heart failure ([place] [address] )  Active Problems:    Atrial fibrillation with RVR ([place] [address] )    JUANITA (acute kidney injury) ([place] [address] )    Hyperlipemia      Assessment & Plan:    Atrial fibrillation with RVR ([place] [address] )  Assessment & Plan  Noted to be in a fib RVR on arrival, HR between 110-150s  · Given metoprolol 10mg in ED, cardizem bolus  · Controlled on Cardizem drip 15mg/hr  · Continue xarelto  · Cardiology consult    8/15 Patient taken off diltiazem gtt as hr dec to 55, now in the 130s and as high as 150 when I was in the room while rest  Discussed with cardiology they will adjust meds  Awaiting PT eval need to monitor hr during ambulation     * New onset of congestive heart failure (HCC)  Assessment & Plan  Wt Readings from Last 3 Encounters:   08/15/22 51 3 kg (113 lb)   08/22/21 55 8 kg (123 lb)     Patient presents with worsening edema x2 weeks,   · Started on HCTZ 12 5mg by PCP last week without improvement   · BNP 2787  · Troponin trend negative  Echo ordered  Start lasix 40mg IV daily  Elevate legs  Daily weights, I/Os  Fluid, sodium restriction    8/15 Continue lasix, Last ejection fraction was 30-35%, EF today is 25-30%, continue to follow closely with cardiology           Hyperlipemia  Assessment & Plan  Continue atorvastatin     JUANITA (acute kidney injury) ([facility] )  Assessment & Plan  Creatine 1 4, baseline 1 0-1 3   Monitor closely while on lasix   Avoid nephrotoxins   Monitor with BMP    8/15 Cr 1 4 today, continue to monitor closely            VTE Pharmacologic Prophylaxis:   Pharmacologic: Rivaroxaban (Xarelto)  Mechanical VTE Prophylaxis in Place: Yes    Patient Centered Rounds: I have performed bedside rounds with nursing staff today      Discussions with Specialists or Other Care Team Provider: Yes    Education and Discussions with Family / Patient: Yes    Time Spent for Care: 30 minutes  More than 50% of total time spent on counseling and coordination of care as described above  Current Length of Stay: 1 day(s)    Current Patient Status: Inpatient   Certification Statement: The patient will continue to require additional inpatient hospital stay due to AFIB with RVR    Discharge Plan: Home    Code Status: Level 3 - DNAR and DNI      Subjective:   Patient seen and examined at bedside  NAD  HR elevated to the 150s during my encounter  Cardiology to adjust meds  Patient has no active complaints and reports shes feeling well     Objective:     Vitals:   Temp (24hrs), Av 6 °F (36 4 °C), Min:97 3 °F (36 3 °C), Max:98 8 °F (37 1 °C)    Temp:  [97 3 °F (36 3 °C)-98 8 °F (37 1 °C)] 97 4 °F (36 3 °C)  HR:  [] 131  Resp:  [18-20] 20  BP: (104-142)/(56-92) 110/70  SpO2:  [91 %-94 %] 93 %  Body mass index is 22 82 kg/m²  Input and Output Summary (last 24 hours):        Intake/Output Summary (Last 24 hours) at 8/15/2022 1336  Last data filed at 8/15/2022 1052  Gross per 24 hour   Intake 240 ml   Output 900 ml   Net -660 ml       Physical Exam:     Gen - NAD, Alert and awake    CVA -  irregularly irregular    Lungs - Minimal crackles lung bases    ABD - Soft nontender, + BS    Ext - trace daniella      Additional Data:     Labs:    Results from last 7 days   Lab Units 08/15/22  0448   WBC Thousand/uL 5 15   HEMOGLOBIN g/dL 13 6   HEMATOCRIT % 42 2   PLATELETS Thousands/uL 281   NEUTROS PCT % 55   LYMPHS PCT % 33   MONOS PCT % 9   EOS PCT % 2     Results from last 7 days   Lab Units 08/15/22  0448   POTASSIUM mmol/L 3 8   CHLORIDE mmol/L 105   CO2 mmol/L 33*   BUN mg/dL 28*   CREATININE mg/dL 1 44*   CALCIUM mg/dL 8 4   ALK PHOS U/L 100   ALT U/L 13   AST U/L 16     Results from last 7 days   Lab Units 22  0027   INR  2 03*       * Additional Pertinent Lab Tests Reviewed: All Labs Within Last 24 Hours Reviewed        Recent Cultures (last 7 days):           Last 24 Hours Medication List:   Current Facility-Administered Medications   Medication Dose Route Frequency Provider Last Rate    acetaminophen  650 mg Oral Q6H PRN Cathern Pap, PA-C      atorvastatin  10 mg Oral Daily Amelia Vecellio, PA-C      furosemide  40 mg Intravenous Daily Amelia Vecellio, PA-C      metoprolol tartrate  50 mg Oral Q12H [address] SUMA Parra      ondansetron  4 mg Intravenous Q6H PRN Cathern Pap, PA-C      rivaroxaban  15 mg Oral QPM Cathern Pap, PA-C            Today, Patient Was Seen By: Mark Isaac MD    ** Please Note: "This note has been constructed using a voice recognition system  Therefore there may be syntax, spelling, and/or grammatical errors   Please call if you have any questions  "**

## 2022-08-16 PROBLEM — I50.41 ACUTE COMBINED SYSTOLIC AND DIASTOLIC CONGESTIVE HEART FAILURE (HCC): Status: ACTIVE | Noted: 2022-08-14

## 2022-08-16 LAB
ANION GAP SERPL CALCULATED.3IONS-SCNC: 9 MMOL/L (ref 4–13)
ATRIAL RATE: 141 BPM
BASOPHILS # BLD AUTO: 0.03 THOUSANDS/ΜL (ref 0–0.1)
BASOPHILS NFR BLD AUTO: 1 % (ref 0–1)
BUN SERPL-MCNC: 27 MG/DL (ref 5–25)
CALCIUM SERPL-MCNC: 8.6 MG/DL (ref 8.3–10.1)
CHLORIDE SERPL-SCNC: 100 MMOL/L (ref 96–108)
CO2 SERPL-SCNC: 30 MMOL/L (ref 21–32)
CREAT SERPL-MCNC: 1.19 MG/DL (ref 0.6–1.3)
EOSINOPHIL # BLD AUTO: 0.16 THOUSAND/ΜL (ref 0–0.61)
EOSINOPHIL NFR BLD AUTO: 2 % (ref 0–6)
ERYTHROCYTE [DISTWIDTH] IN BLOOD BY AUTOMATED COUNT: 12.9 % (ref 11.6–15.1)
GFR SERPL CREATININE-BSD FRML MDRD: 42 ML/MIN/1.73SQ M
GLUCOSE SERPL-MCNC: 128 MG/DL (ref 65–140)
HCT VFR BLD AUTO: 45.2 % (ref 34.8–46.1)
HGB BLD-MCNC: 15 G/DL (ref 11.5–15.4)
IMM GRANULOCYTES # BLD AUTO: 0.02 THOUSAND/UL (ref 0–0.2)
IMM GRANULOCYTES NFR BLD AUTO: 0 % (ref 0–2)
LYMPHOCYTES # BLD AUTO: 1.58 THOUSANDS/ΜL (ref 0.6–4.47)
LYMPHOCYTES NFR BLD AUTO: 24 % (ref 14–44)
MCH RBC QN AUTO: 31.4 PG (ref 26.8–34.3)
MCHC RBC AUTO-ENTMCNC: 33.2 G/DL (ref 31.4–37.4)
MCV RBC AUTO: 95 FL (ref 82–98)
MONOCYTES # BLD AUTO: 0.42 THOUSAND/ΜL (ref 0.17–1.22)
MONOCYTES NFR BLD AUTO: 6 % (ref 4–12)
NEUTROPHILS # BLD AUTO: 4.35 THOUSANDS/ΜL (ref 1.85–7.62)
NEUTS SEG NFR BLD AUTO: 67 % (ref 43–75)
NRBC BLD AUTO-RTO: 0 /100 WBCS
PLATELET # BLD AUTO: 285 THOUSANDS/UL (ref 149–390)
PMV BLD AUTO: 10 FL (ref 8.9–12.7)
POTASSIUM SERPL-SCNC: 4.3 MMOL/L (ref 3.5–5.3)
QRS AXIS: -19 DEGREES
QRSD INTERVAL: 80 MS
QT INTERVAL: 342 MS
QTC INTERVAL: 497 MS
RBC # BLD AUTO: 4.78 MILLION/UL (ref 3.81–5.12)
SODIUM SERPL-SCNC: 139 MMOL/L (ref 135–147)
T WAVE AXIS: -79 DEGREES
VENTRICULAR RATE: 127 BPM
WBC # BLD AUTO: 6.56 THOUSAND/UL (ref 4.31–10.16)

## 2022-08-16 PROCEDURE — 99232 SBSQ HOSP IP/OBS MODERATE 35: CPT | Performed by: INTERNAL MEDICINE

## 2022-08-16 PROCEDURE — 80048 BASIC METABOLIC PNL TOTAL CA: CPT | Performed by: INTERNAL MEDICINE

## 2022-08-16 PROCEDURE — 93010 ELECTROCARDIOGRAM REPORT: CPT | Performed by: INTERNAL MEDICINE

## 2022-08-16 PROCEDURE — 97530 THERAPEUTIC ACTIVITIES: CPT

## 2022-08-16 PROCEDURE — 97167 OT EVAL HIGH COMPLEX 60 MIN: CPT

## 2022-08-16 PROCEDURE — 97163 PT EVAL HIGH COMPLEX 45 MIN: CPT

## 2022-08-16 PROCEDURE — 85025 COMPLETE CBC W/AUTO DIFF WBC: CPT | Performed by: INTERNAL MEDICINE

## 2022-08-16 RX ORDER — SPIRONOLACTONE 25 MG/1
12.5 TABLET ORAL DAILY
Status: DISCONTINUED | OUTPATIENT
Start: 2022-08-16 | End: 2022-08-16

## 2022-08-16 RX ORDER — FUROSEMIDE 40 MG/1
40 TABLET ORAL DAILY
Status: DISCONTINUED | OUTPATIENT
Start: 2022-08-17 | End: 2022-08-16

## 2022-08-16 RX ORDER — DILTIAZEM HYDROCHLORIDE 180 MG/1
180 CAPSULE, COATED, EXTENDED RELEASE ORAL DAILY
Status: DISCONTINUED | OUTPATIENT
Start: 2022-08-16 | End: 2022-08-16

## 2022-08-16 RX ORDER — SPIRONOLACTONE 25 MG/1
12.5 TABLET ORAL DAILY
Status: DISCONTINUED | OUTPATIENT
Start: 2022-08-17 | End: 2022-08-18

## 2022-08-16 RX ORDER — DIGOXIN 0.25 MG/ML
125 INJECTION INTRAMUSCULAR; INTRAVENOUS EVERY 8 HOURS
Status: COMPLETED | OUTPATIENT
Start: 2022-08-16 | End: 2022-08-17

## 2022-08-16 RX ORDER — METOPROLOL TARTRATE 100 MG/1
100 TABLET ORAL EVERY 12 HOURS SCHEDULED
Status: DISCONTINUED | OUTPATIENT
Start: 2022-08-16 | End: 2022-08-17

## 2022-08-16 RX ORDER — DIGOXIN 0.25 MG/ML
250 INJECTION INTRAMUSCULAR; INTRAVENOUS ONCE
Status: COMPLETED | OUTPATIENT
Start: 2022-08-16 | End: 2022-08-16

## 2022-08-16 RX ORDER — FUROSEMIDE 20 MG/1
20 TABLET ORAL DAILY
Status: DISCONTINUED | OUTPATIENT
Start: 2022-08-17 | End: 2022-08-18

## 2022-08-16 RX ORDER — LOSARTAN POTASSIUM 25 MG/1
25 TABLET ORAL DAILY
Status: DISCONTINUED | OUTPATIENT
Start: 2022-08-17 | End: 2022-08-19 | Stop reason: HOSPADM

## 2022-08-16 RX ADMIN — ATORVASTATIN CALCIUM 10 MG: 10 TABLET, FILM COATED ORAL at 09:04

## 2022-08-16 RX ADMIN — METOPROLOL TARTRATE 50 MG: 50 TABLET, FILM COATED ORAL at 09:04

## 2022-08-16 RX ADMIN — METOPROLOL TARTRATE 100 MG: 100 TABLET, FILM COATED ORAL at 21:44

## 2022-08-16 RX ADMIN — DIGOXIN 125 MCG: 0.25 INJECTION INTRAMUSCULAR; INTRAVENOUS at 21:46

## 2022-08-16 RX ADMIN — FUROSEMIDE 40 MG: 10 INJECTION, SOLUTION INTRAMUSCULAR; INTRAVENOUS at 09:04

## 2022-08-16 RX ADMIN — DIGOXIN 250 MCG: 0.25 INJECTION INTRAMUSCULAR; INTRAVENOUS at 11:04

## 2022-08-16 RX ADMIN — RIVAROXABAN 15 MG: 15 TABLET, FILM COATED ORAL at 17:52

## 2022-08-16 NOTE — PLAN OF CARE
Problem: PAIN - ADULT  Goal: Verbalizes/displays adequate comfort level or baseline comfort level  Description: Interventions:  - Encourage patient to monitor pain and request assistance  - Assess pain using appropriate pain scale  - Administer analgesics based on type and severity of pain and evaluate response  - Implement non-pharmacological measures as appropriate and evaluate response  - Consider cultural and social influences on pain and pain management  - Notify physician/advanced practitioner if interventions unsuccessful or patient reports new pain  Outcome: Progressing     Problem: INFECTION - ADULT  Goal: Absence or prevention of progression during hospitalization  Description: INTERVENTIONS:  - Assess and monitor for signs and symptoms of infection  - Monitor lab/diagnostic results  - Monitor all insertion sites, i e  indwelling lines, tubes, and drains  - Monitor endotracheal if appropriate and nasal secretions for changes in amount and color  - [place] appropriate cooling/warming therapies per order  - Administer medications as ordered  - Instruct and encourage patient and family to use good hand hygiene technique  - Identify and instruct in appropriate isolation precautions for identified infection/condition  Outcome: Progressing  Goal: Absence of fever/infection during neutropenic period  Description: INTERVENTIONS:  - Monitor WBC    Outcome: Progressing     Problem: SAFETY ADULT  Goal: Patient will remain free of falls  Description: INTERVENTIONS:  - Educate patient/family on patient safety including physical limitations  - Instruct patient to call for assistance with activity   - Consult OT/PT to assist with strengthening/mobility   - Keep Call bell within reach  - Keep bed low and locked with side rails adjusted as appropriate  - Keep care items and personal belongings within reach  - Initiate and maintain comfort rounds  - Make Fall Risk Sign visible to staff  - Offer Toileting every 2Hours, in advance of need  - Initiate/Maintain bed/chair alarm  - Apply yellow socks and bracelet for high fall risk patients  - Consider moving patient to room near nurses station  Outcome: Progressing  Goal: Maintain or return to baseline ADL function  Description: INTERVENTIONS:  -  Assess patient's ability to carry out ADLs; assess patient's baseline for ADL function and identify physical deficits which impact ability to perform ADLs (bathing, care of mouth/teeth, toileting, grooming, dressing, etc )  - Assess/evaluate cause of self-care deficits   - Assess range of motion  - Assess patient's mobility; develop plan if impaired  - Assess patient's need for assistive devices and provide as appropriate  - Encourage maximum independence but intervene and supervise when necessary  - Involve family in performance of ADLs  - Assess for home care needs following discharge   - Consider OT consult to assist with ADL evaluation and planning for discharge  - Provide patient education as appropriate  Outcome: Progressing     Problem: DISCHARGE PLANNING  Goal: Discharge to home or other facility with appropriate resources  Description: INTERVENTIONS:  - Identify barriers to discharge w/patient and caregiver  - Arrange for needed discharge resources and transportation as appropriate  - Identify discharge learning needs (meds, wound care, etc )  - Arrange for interpretive services to assist at discharge as needed  - Refer to Case Management Department for coordinating discharge planning if the patient needs post-hospital services based on physician/advanced practitioner order or complex needs related to functional status, cognitive ability, or social support system  Outcome: Progressing     Problem: Knowledge Deficit  Goal: Patient/family/caregiver demonstrates understanding of disease process, treatment plan, medications, and discharge instructions  Description: Complete learning assessment and assess knowledge base   Interventions:  - Provide teaching at level of understanding  - Provide teaching via preferred learning methods  Outcome: Progressing

## 2022-08-16 NOTE — OCCUPATIONAL THERAPY NOTE
OT EVALUATION       08/16/22 0950   Note Type   Note type Evaluation   Restrictions/Precautions   Other Precautions Fall Risk; Chair Alarm; Bed Alarm   Pain Assessment   Pain Assessment Tool 0-10   Pain Score No Pain   Home Living   Type of Home House   Home Layout Two level; Able to live on main level with bedroom/bathroom  (4 TREVOR, uses commode on 1st floor and sponge bathes mostly)   Bathroom Shower/Tub Tub/shower unit   Bathroom Equipment Commode   Home Equipment Walker;Cane   Additional Comments pt drives   Prior Function   Level of [place] Independent with ADLs and functional mobility   Lives With Alone   Receives Help From Friend(s)   ADL Assistance Independent   IADLs Needs assistance   Comments Patient admitted with CHF and AFib     ADL   Eating Assistance 7  Independent   Grooming Assistance 5  Supervision/Setup   Grooming Deficit Standing with assistive device   UB Bathing Assistance 5  Supervision/Setup   LB Bathing Assistance 4  Minimal Assistance   UB Dressing Assistance 5  Supervision/Setup   LB Dressing Assistance 4  Minimal Assistance   Toileting Assistance  5  Supervision/Setup   Transfers   Sit to Stand 4  Minimal assistance   Stand to Sit 4  Minimal assistance   Toilet transfer 5  Supervision   Additional items Standard toilet   Functional Mobility   Functional Mobility 4  Minimal assistance  (min assist/supervision)   Additional Comments functional household distance to and from bathroom, 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   + antalgic gait   Additional items Rolling walker   Balance   Static Sitting Fair +   Dynamic Sitting Fair   Static Standing Fair   Dynamic Standing Fair -   Activity Tolerance   Activity Tolerance Patient limited by fatigue   RUE Assessment   RUE Assessment WFL   LUE Assessment   LUE Assessment WFL   Cognition   Overall Cognitive Status WFL   Arousal/Participation Cooperative   Attention Within functional limits   Orientation Level Oriented X4   Following Commands Follows all commands and directions without difficulty   Assessment   Limitation Decreased UE strength;Decreased ADL status; Decreased Safe judgement during ADL;Decreased endurance;Decreased self-care trans;Decreased high-level ADLs  (decreased balacne and mobility)   Prognosis Good   Assessment Patient evaluated by Occupational Therapy  Patient admitted with New onset of congestive heart failure ([facility] [address] )  The patients occupational profile, medical and therapy history includes a extensive additional review of physical, cognitive, or psychosocial history related to current functional performance  Comorbidities affecting functional mobility and ADLS include: Afib  Prior to admission, patient was independent with functional mobility without assistive device, independent with ADLS and independent with IADLS  The evaluation identifies the following performance deficits: weakness, impaired balance, decreased endurance, increased fall risk, new onset of impairment of functional mobility, decreased ADLS, decreased IADLS, decreased activity tolerance, decreased safety awareness, impaired judgement and decreased strength, that result in activity limitations and/or participation restrictions  This evaluation requires clinical decision making of high complexity, because the patient presents with comorbidites that affect occupational performance and required significant modification of tasks or assistance with consideration of multiple treatment options  The Barthel Index was used as a functional outcome tool presenting with a score of Barthel Index Score: 55, indicating marked limitations of functional mobility and ADLS  The patient's raw score on the -PAC Daily Activity inpatient short form is 21, standardized score is 44 27, greater than 39 4  Patients at this level are likely to benefit from DC to home  Please refer to the recommendation of the Occupational Therapist for safe DC planning   Patient will benefit from skilled Occupational Therapy services to address above deficits and facilitate a safe return to prior level of function  Goals   Patient Goals to go home   STG Time Frame   (1-7 days)   Short Term Goal  Goals established to promote Patient Goals: to go home:  Patient will increase standing tolerance to 3 minutes during ADL task to decrease assistance level and decrease fall risk; Patient will increase bed mobility to independent in preparation for ADLS and transfers; Patient will increase functional mobility to and from bathroom with rolling walker with supervision to increase performance with ADLS and to use a toilet; Patient will tolerate 10 minutes of UE ROM/strengthening to increase general activity tolerance and performance in ADLS/IADLS; Patient will improve functional activity tolerance to 10 minutes of sustained functional tasks to increase participation in basic self-care and decrease assistance level;  Patient will increase dynamic sitting balance to fair+ to improve the ability to sit at edge of bed or on a chair for ADLS;  Patient will increase dynamic standing balance to fair to improve postural stability and decrease fall risk during standing ADLS and transfers     LTG Time Frame   (8-14 days)   Long Term Goal Patient will increase standing tolerance to 6 minutes during ADL task to decrease assistance level and decrease fall risk;  Patient will increase functional mobility to and from bathroom with rolling walker independently to increase performance with ADLS and to use a toilet; Patient will tolerate 20 minutes of UE ROM/strengthening to increase general activity tolerance and performance in ADLS/IADLS; Patient will improve functional activity tolerance to 20 minutes of sustained functional tasks to increase participation in basic self-care and decrease assistance level;  Patient will increase dynamic sitting balance to good to improve the ability to sit at edge of bed or on a chair for ADLS; Patient will increase dynamic standing balance to fair+ to improve postural stability and decrease fall risk during standing ADLS and transfers  Pt will score >/= 24/24 on AM-PAC Daily Activity Inpatient scale to promote safe independence with ADLs and functional mobility; Pt will score >/= 85/100 on Barthel Index in order to decrease caregiver assistance needed and increase ability to perform ADLs and functional mobility  Functional Transfer Goals   Pt Will Perform All Functional Transfers   (STG supervision LTG Independent)   ADL Goals   Pt Will Perform Grooming Standing at sink  (STG supervision LTG Independent)   Pt Will Perform Bathing   (STG supervision LTG Independent)   Pt Will Perform LE Dressing   (STG supervision LTG Independent)   Pt Will Perform Toileting   (STG supervision LTG Independent)   Plan   Treatment Interventions ADL retraining;Functional transfer training;UE strengthening/ROM; Endurance training;Patient/family training;Equipment evaluation/education; Activityengagement; Compensatory technique education   Goal Expiration Date 08/30/22   OT Frequency 3-5x/wk   Recommendation   OT Discharge Recommendation Home with home health rehabilitation   AM-PAC Daily Activity Inpatient   Lower Body Dressing 3   Bathing 3   Toileting 3   Upper Body Dressing 4   Grooming 4   Eating 4   Daily Activity Raw Score 21   Daily Activity Standardized Score (Calc for Raw Score >=11) 44 27   AM-PAC Applied Cognition Inpatient   Following a Speech/Presentation 4   Understanding Ordinary Conversation 4   Taking Medications 4   Remembering Where Things Are Placed or Put Away 4   Remembering List of 4-5 Errands 4   Taking Care of Complicated Tasks 4   Applied Cognition Raw Score 24   Applied Cognition Standardized Score 62 21   Barthel Index   Feeding 10   Bathing 0   Grooming Score 0   Dressing Score 5   Bladder Score 10   Bowels Score 10   Toilet Use Score 5   Transfers (Bed/Chair) Score 10   Mobility (Level Surface) Score 0   Stairs Score 5   Barthel Index Score 54   Licensure   [place] License Number  Mushtaq Ashton Lucarmine [address] OTR/L [id number]

## 2022-08-16 NOTE — ASSESSMENT & PLAN NOTE
Noted to be in a fib RVR on arrival, HR between 110-150s  · Given metoprolol 10mg in ED, cardizem bolus  · Patient has been on Cardizem drip at 15 milligram/hour which was later discontinued as blood pressure was on the soft side heart rate was low  Patient was later started on IV amiodarone loading which was discontinued as patient has chronic atrial fibrillation  · Patient is on anticoagulation with Xarelto  · Cardiology input appreciated  · Patient is being loaded with digoxin-patient received 0 25 milligrams of IV digoxin and then will receive 0 125 milligram IV x2 more doses  · Patient is on Lopressor which was increased to 100 milligram p o  B i d     8/15 Patient taken off diltiazem gtt as hr dec to 55, now in the 130s and as high as 150 when I was in the room while rest  Discussed with cardiology they will adjust meds   Awaiting PT eval need to monitor hr during ambulation

## 2022-08-16 NOTE — PROGRESS NOTES
Progress Note - Cardiology   [facility]     Dallin Bass 80 y o  female MRN: [id number]  : 1940  Unit/Bed#: [address] 407-01 Encounter: [id number]    Assessment and Plan:   1  Acute on chronic combined systolic and diastolic heart failure:  Echo demonstrates EF of around 30%  -  will increase Lopressor to 100 mg b i d     -  holding on ACE/ARB due to low blood pressure    -  discontinue IV Lasix and transition to Lasix 40 mg p o  Starting 2022    -  close monitoring of I&O, daily weights electrolytes    -  will transition to long-acting beta-blocker to short-acting beta-blocker and increased for heart rate     2  Chronic atrial fibrillation:  Patient's heart rates still not well controlled    -  initially placed on Cardizem drip of was discontinued due to bradycardia, amiodarone IV overnight but discontinued    -  will increase Lopressor to 100 mg b i d     -  will start digoxin with 0 25 mg IV x1 and then 0 125 mg IV x2 q 8 hours and re-evaluate in the jed Blood -  continue Xarelto 50 mg daily     3  Dyslipidemia:  Continue Lipitor mg daily     4  Acute kidney injury on chronic kidney disease:  Baseline creatinine 1-1 3    -  creatinine today is 1 44    -  most likely due to rapid atrial fibrillation     Subjective / Objective:   Patient seen and examined  She states she has had atrial fibrillation for about 13 years and is unaware of the rapid heart rates  No real improvement with amiodarone  EF on echocardiogram was noted to be approximately 30% with both atria moderately to severely dilated  Vitals: Blood pressure 114/90, pulse (!) 118, temperature 97 5 °F (36 4 °C), resp  rate 15, height 4' 11" (1 499 m), weight 51 5 kg (113 lb 8 oz), SpO2 93 %  Vitals:    08/15/22 0844 22 0539   Weight: 51 3 kg (113 lb) 51 5 kg (113 lb 8 oz)     Body mass index is 22 92 kg/m²    BP Readings from Last 3 Encounters:   22 114/90   21 142/100     Orthostatic Blood Pressures    Flowsheet Row Most Recent Value   Blood Pressure 114/90 filed at 08/16/2022 0748   Patient Position - Orthostatic VS Lying filed at 08/14/2022 0200        I/O       08/14 0701  08/15 0700 08/15 0701 08/16 0700 08/16 0701 08/17 0700    P  O  900 50     I V  (mL/kg)       Total Intake(mL/kg) 900 (17 5) 50 (1)     Urine (mL/kg/hr) 2000 (1 6) 800 (0 6)     Total Output 2000 800     Net -1100 -750                Invasive Devices  Report    Peripheral Intravenous Line  Duration           Peripheral IV 08/15/22 Distal;Right;Dorsal (posterior) Forearm <1 day                  Intake/Output Summary (Last 24 hours) at 8/16/2022 0946  Last data filed at 8/15/2022 2204  Gross per 24 hour   Intake 50 ml   Output 800 ml   Net -750 ml         Physical Exam:   Physical Exam  Vitals and nursing note reviewed  Constitutional:       General: She is not in acute distress  Appearance: Normal appearance  She is normal weight  HENT:      Right Ear: External ear normal       Left Ear: External ear normal       Nose: Nose normal    Eyes:      General: No scleral icterus  Right eye: No discharge  Left eye: No discharge  Cardiovascular:      Rate and Rhythm: Tachycardia present  Rhythm irregular  Pulses: Normal pulses  Pulmonary:      Effort: Pulmonary effort is normal  No respiratory distress  Breath sounds: Normal breath sounds  Abdominal:      General: Bowel sounds are normal  There is no distension  Palpations: Abdomen is soft  Musculoskeletal:      Right lower leg: No edema  Left lower leg: No edema  Skin:     General: Skin is warm and dry  Capillary Refill: Capillary refill takes less than 2 seconds  Neurological:      General: No focal deficit present  Mental Status: She is alert and oriented to person, place, and time  Mental status is at baseline     Psychiatric:         Mood and Affect: Mood normal                  Medications/ Allergies:     Current Facility-Administered Medications   Medication Dose Route Frequency Provider Last Rate    acetaminophen  650 mg Oral Q6H PRN Basil SieADEEL pathak      atorvastatin  10 mg Oral Daily Amelia SilvaADEEL lantigua      diltiazem  180 mg Oral Daily Valentino Church, CRNP      furosemide  40 mg Intravenous Daily Gus CurtisADEEL pathak      metoprolol tartrate  100 mg Oral Q12H [address] Valentino Church, CRNP      ondansetron  4 mg Intravenous Q6H PRN Basil KirstenADEEL pathak      rivaroxaban  15 mg Oral QPM Amelia ADEEL Penn       acetaminophen, 650 mg, Q6H PRN  ondansetron, 4 mg, Q6H PRN      No Known Allergies    VTE Pharmacologic Prophylaxis:   Sequential compression device (Venodyne)     Labs:   Troponins:  Results from last 7 days   Lab Units 08/14/22  0225   HSTNI D2 ng/L 0     CBC with diff:  Results from last 7 days   Lab Units 08/16/22  0436 08/15/22  0448 08/14/22  0606 08/14/22  0027   WBC Thousand/uL 6 56 5 15 6 41 7 62   HEMOGLOBIN g/dL 15 0 13 6 13 7 14 5   HEMATOCRIT % 45 2 42 2 41 8 44 7   MCV fL 95 96 95 96   PLATELETS Thousands/uL 285 281 284 313   MCH pg 31 4 31 0 31 2 31 0   MCHC g/dL 33 2 32 2 32 8 32 4   RDW % 12 9 12 8 12 8 13 0   MPV fL 10 0 9 9 9 5 10 0   NRBC AUTO /100 WBCs 0 0 0 0     CMP:  Results from last 7 days   Lab Units 08/16/22  0436 08/15/22  0448 08/14/22  0606 08/14/22  0027   SODIUM mmol/L 139 145 142 142   POTASSIUM mmol/L 4 3 3 8 4 2 4 0   CHLORIDE mmol/L 100 105 103 103   CO2 mmol/L 30 33* 30 27   ANION GAP mmol/L 9 7 9 12   BUN mg/dL 27* 28* 25 27*   CREATININE mg/dL 1 19 1 44* 1 30 1 40*   CALCIUM mg/dL 8 6 8 4 9 1 9 4   AST U/L  --  16  --  21   ALT U/L  --  13  --  15   ALK PHOS U/L  --  100  --  123*   TOTAL PROTEIN g/dL  --  6 5  --  8 1   ALBUMIN g/dL  --  2 9*  --  3 9   TOTAL BILIRUBIN mg/dL  --  0 73  --  0 76   EGFR ml/min/1 73sq m 42 33 38 35     Coags:  Results from last 7 days   Lab Units 08/14/22  0027   PTT seconds 31   INR  2 03*     NT-proBNP:   Recent Labs 08/14/22  0027   NTBNP 2,787*        Imaging & Testing   I have personally reviewed pertinent reports  XR chest 1 view portable    Result Date: 8/14/2022  Narrative: CHEST INDICATION:   swelling  COMPARISON:  Chest radiograph from 11/6/2015  EXAM PERFORMED/VIEWS:  XR CHEST PORTABLE FINDINGS: Cardiomediastinal silhouette appears unremarkable  The lungs are clear  No pneumothorax or pleural effusion  Osseous structures appear within normal limits for patient age  Impression: No acute cardiopulmonary disease  Workstation performed: [id number]     Echo complete w/ contrast if indicated    Result Date: 8/15/2022  Narrative: Tejas Pert  Left Ventricle: Left ventricular cavity size is normal  Wall thickness is normal  Systolic function is severely reduced  Estimated ejection fraction is 25-30%  Wall motion cannot be accurately assessed  Diastolic dysfunction, grade indeterminate due to underlying atrial fibrillation    Right Ventricle: Systolic function is reduced    Left Atrium: The atrium is severely dilated    Right Atrium: The atrium is moderately dilated    Mitral Valve: There is annular calcification  There is mild regurgitation    Tricuspid Valve: There is mild regurgitation  The right ventricular systolic pressure is mildly elevated  EKG / Monitor: Personally reviewed  Atrial fibrillation rates 110s      Ifrah Holloway, [address]  Cardiology      "This note has been constructed using a voice recognition system  Therefore there may be syntax, spelling, and/or grammatical errors   Please call if you have any questions  "

## 2022-08-16 NOTE — PROGRESS NOTES
[facility]  Progress Note - Olena Lee 1940, 80 y o  female MRN: [id number]  Unit/Bed#: [address] [facility] Encounter: [id number]  Primary Care Provider: Jaleesa Landrum MD   Date and time admitted to hospital: 8/14/2022 12:04 AM    * Acute combined systolic and diastolic congestive heart failure ([address] )  Assessment & Plan  Wt Readings from Last 3 Encounters:   08/16/22 51 5 kg (113 lb 8 oz)   08/22/21 55 8 kg (123 lb)     Patient presents with worsening edema x2 weeks,   · Started on HCTZ 12 5mg by PCP last week without improvement   · BNP 2787  · Troponin trend negative  Echo showed EF of 72-59% with diastolic dysfunction with severely dilated left atrium right atrium  Echo from 2020 showed EF of 30-35%  The patient received IV Lasix with very good diuresis  Patient is -2 8 liters since admission  Patient to be transitioned to Lasix 20 milligram p o  Daily tomorrow  Patient added on Aldactone 12 5 milligram p o  Daily  Patient also had a losartan 25 milligram p o  Daily          Atrial fibrillation with RVR ([facility])  Assessment & Plan  Noted to be in a fib RVR on arrival, HR between 110-150s  · Given metoprolol 10mg in ED, cardizem bolus  · Patient has been on Cardizem drip at 15 milligram/hour which was later discontinued as blood pressure was on the soft side heart rate was low  Patient was later started on IV amiodarone loading which was discontinued as patient has chronic atrial fibrillation  · Patient is on anticoagulation with Xarelto  · Cardiology input appreciated  · Patient is being loaded with digoxin-patient received 0 25 milligrams of IV digoxin and then will receive 0 125 milligram IV x2 more doses  · Patient is on Lopressor which was increased to 100 milligram p o  B i d     8/15 Patient taken off diltiazem gtt as hr dec to 55, now in the 130s and as high as 150 when I was in the room while rest  Discussed with cardiology they will adjust meds   Awaiting PT eval need to monitor hr during ambulation     JUANITA (acute kidney injury) ([facility] [address] )  Assessment & Plan  Creatine 1 4, baseline 1 0-1 3   Patient has been on IV Lasix which will be transitioned to Lasix 20 milligram p o  Daily from tomorrow   Avoid nephrotoxins and hypotension   Creatinine has improved  Results from last 7 days   Lab Units 22  0436 08/15/22  0448 22  0606 22  0027   BUN mg/dL 27* 28* 25 27*   CREATININE mg/dL 1 19 1 44* 1 30 1 40*         Hyperlipemia  Assessment & Plan  Continue atorvastatin           VTE Pharmacologic Prophylaxis: VTE Score: 4 Moderate Risk (Score 3-4) - Pharmacological DVT Prophylaxis Ordered: rivaroxaban (Xarelto)  Patient Centered Rounds: I performed bedside rounds with nursing staff today  Discussions with Specialists or Other Care Team Provider: Dr Sonia Hand    Education and Discussions with Family / Patient: yes    Time Spent for Care: 45 minutes  More than 50% of total time spent on counseling and coordination of care as described above  Current Length of Stay: 2 day(s)  Current Patient Status: Inpatient   Certification Statement: The patient will continue to require additional inpatient hospital stay due to Uncontrolled atrial fibrillation, acute CHF  Discharge Plan: Anticipate discharge in 24-48 hrs to home  Code Status: Level 3 - DNAR and DNI    Subjective:   Patient denies any shortness of breath, palpitations, chest pain  Objective:     Vitals:   Temp (24hrs), Av 7 °F (36 5 °C), Min:97 5 °F (36 4 °C), Max:98 1 °F (36 7 °C)    Temp:  [97 5 °F (36 4 °C)-98 1 °F (36 7 °C)] 98 1 °F (36 7 °C)  HR:  [109-126] 125  Resp:  [15-18] 18  BP: (114-128)/(83-90) 120/85  SpO2:  [91 %-95 %] 95 %  Body mass index is 22 92 kg/m²  Input and Output Summary (last 24 hours):      Intake/Output Summary (Last 24 hours) at 2022 1555  Last data filed at 2022 1300  Gross per 24 hour   Intake 50 ml   Output 900 ml   Net -850 ml       Physical Exam:   Physical Exam  Constitutional:       Appearance: Normal appearance  HENT:      Head: Normocephalic and atraumatic  Nose: Nose normal       Mouth/Throat:      Mouth: Mucous membranes are moist       Pharynx: Oropharynx is clear  Eyes:      Extraocular Movements: Extraocular movements intact  Pupils: Pupils are equal, round, and reactive to light  Cardiovascular:      Rate and Rhythm: Normal rate  Rhythm irregular  Pulmonary:      Effort: Pulmonary effort is normal       Breath sounds: Rales present  Abdominal:      General: Bowel sounds are normal  There is no distension  Palpations: Abdomen is soft  Tenderness: There is no abdominal tenderness  Musculoskeletal:         General: No swelling  Cervical back: Normal range of motion and neck supple  Skin:     General: Skin is warm and dry  Neurological:      General: No focal deficit present  Mental Status: She is alert            Additional Data:     Labs:  Results from last 7 days   Lab Units 08/16/22  0436   WBC Thousand/uL 6 56   HEMOGLOBIN g/dL 15 0   HEMATOCRIT % 45 2   PLATELETS Thousands/uL 285   NEUTROS PCT % 67   LYMPHS PCT % 24   MONOS PCT % 6   EOS PCT % 2     Results from last 7 days   Lab Units 08/16/22  0436 08/15/22  0448   SODIUM mmol/L 139 145   POTASSIUM mmol/L 4 3 3 8   CHLORIDE mmol/L 100 105   CO2 mmol/L 30 33*   BUN mg/dL 27* 28*   CREATININE mg/dL 1 19 1 44*   ANION GAP mmol/L 9 7   CALCIUM mg/dL 8 6 8 4   ALBUMIN g/dL  --  2 9*   TOTAL BILIRUBIN mg/dL  --  0 73   ALK PHOS U/L  --  100   ALT U/L  --  13   AST U/L  --  16   GLUCOSE RANDOM mg/dL 128 114     Results from last 7 days   Lab Units 08/14/22  0027   INR  2 03*                   Lines/Drains:  Invasive Devices  Report    Peripheral Intravenous Line  Duration           Peripheral IV 08/15/22 Distal;Right;Dorsal (posterior) Forearm <1 day                  Telemetry:  Telemetry Orders (From admission, onward)             48 Hour Telemetry Monitoring Continuous x 48 hours        References:    Telemetry Guidelines   Question:  Reason for 48 Hour Telemetry  Answer:  Arrhythmias Requiring Medical Therapy (eg  SVT, Vtach/fib, Bradycardia, Uncontrolled A-fib)                 Telemetry Reviewed: Atrial fibrillation  HR averaging 110-130  Indication for Continued Telemetry Use: Arrthymias requiring medical therapy             Imaging: Reviewed radiology reports from this admission including: chest xray    Recent Cultures (last 7 days):         Last 24 Hours Medication List:   Current Facility-Administered Medications   Medication Dose Route Frequency Provider Last Rate    acetaminophen  650 mg Oral Q6H PRN Lucho Burgess PA-C      atorvastatin  10 mg Oral Daily Amelia Penn PA-C      digoxin  125 mcg Intravenous Q8H SUMA Tiwari      [START ON 8/17/2022] furosemide  20 mg Oral Daily Mallika Jolly MD      [START ON 8/17/2022] losartan  25 mg Oral Daily Mallika Jolly MD      metoprolol tartrate  100 mg Oral Q12H [facility] SUMA Tiwari      ondansetron  4 mg Intravenous Q6H PRN Lucho Burgess PA-C      rivaroxaban  15 mg Oral QPM Amelia Penn PA-C      [START ON 8/17/2022] spironolactone  12 5 mg Oral Daily Mallika Jolly MD          Today, Patient Was Seen By: Alona Mays MD    **Please Note: This note may have been constructed using a voice recognition system  **

## 2022-08-16 NOTE — ASSESSMENT & PLAN NOTE
Wt Readings from Last 3 Encounters:   08/16/22 51 5 kg (113 lb 8 oz)   08/22/21 55 8 kg (123 lb)     Patient presents with worsening edema x2 weeks,   · Started on HCTZ 12 5mg by PCP last week without improvement   · BNP 2787  · Troponin trend negative  Echo showed EF of 99-68% with diastolic dysfunction with severely dilated left atrium right atrium  Echo from 2020 showed EF of 30-35%  The patient received IV Lasix with very good diuresis  Patient is -2 8 liters since admission  Patient to be transitioned to Lasix 20 milligram p o  Daily tomorrow  Patient added on Aldactone 12 5 milligram p o  Daily  Patient also had a losartan 25 milligram p o   Daily

## 2022-08-16 NOTE — PHYSICAL THERAPY NOTE
PHYSICAL THERAPY EVALUATION/TREATMENT     08/16/22 0930   Note Type   Note type Evaluation   Pain Assessment   Pain Assessment Tool 0-10   Pain Score No Pain   Restrictions/Precautions   Other Precautions Fall Risk   Home Living   Type of [address] Two level  (sleeps on couch on first floor, uses BSC on first floor, 4 [address])   Home Equipment Walker;Cane   Prior Function   Level of [place] Independent with ADLs and functional mobility  (uses a cane or walker prn)   Lives With Alone   ADL Assistance Independent   Vocational Retired   Comments Pt drives a car  General   Additional Pertinent History Pt admitted with new onset of CHF, now in afib  Pt reports she feels totally fine  Cognition   Overall Cognitive Status WFL   Assessment   Prognosis Good   Problem List Decreased strength;Decreased endurance; Impaired balance;Decreased mobility   Assessment Patient seen for Physical Therapy evaluation  Patient admitted with New onset of congestive heart failure ([facility] [address] )  Comorbidities affecting patient's physical performance include: afib, CHF, L hip pain  Personal factors affecting patient at time of initial evaluation include: ambulating with assistive device, stairs to enter home, inability to perform dynamic tasks in community and limited home support  Prior to admission, patient was independent with functional mobility with cane or walker prn and independent with functional mobility without assistive device  Please find objective findings from Physical Therapy assessment regarding body systems outlined above with impairments and limitations including weakness, impaired balance, decreased endurance, decreased activity tolerance and decreased functional mobility tolerance  The Barthel Index was used as a functional outcome tool presenting with a score of Barthel Index Score: 55 today indicating marked limitations of functional mobility and ADLS    Patient's clinical presentation is currently unstable/unpredictable as seen in patient's presentation of vital sign response, increased fall risk, new onset of impairment of functional mobility and decreased endurance  Pt would benefit from continued Physical Therapy treatment to address deficits as defined above and maximize level of functional mobility  As demonstrated by objective findings, the assigned level of complexity for this evaluation is high  The patient's [facility] Basic Mobility Inpatient Short Form Raw Score is 19  A Raw score of greater than 16 suggests the patient may benefit from discharge to home  Goals   Patient Goals "go home"   STG Expiration Date 08/23/22   Short Term Goal #1 independent bed mobility, independent transfers, independent ambulation indoor level surfaces 75 feet with a walker   LTG Expiration Date 08/30/22   Long Term Goal #1 independent ambulation outdoor level surfaces 250 feet with least restrictive device with a steady gait so pt can go grocery shopping, independent up and down 4 steps   Plan   Treatment/Interventions Functional transfer training;LE strengthening/ROM; Gait training;Bed mobility; Equipment eval/education;Patient/family training; Therapeutic exercise; Endurance training   PT Frequency   (5w)   Recommendation   PT Discharge Recommendation Home with home health rehabilitation   Equipment Recommended   (pt has a walker and a cane)   [facility] Basic Mobility Inpatient   Turning in Bed Without Bedrails 3   Lying on Back to Sitting on Edge of Flat Bed 3   Moving Bed to Chair 3   Standing Up From Chair 4   Walk in Room 3   Climb 3-5 Stairs 3   Basic Mobility Inpatient Raw Score 19   Basic Mobility Standardized Score 42 48   Highest Level Of Mobility   JH-HLM Goal 6: Walk 10 steps or more   JH-HLM Achieved 7: Walk 25 feet or more   Barthel Index   Feeding 10   Bathing 0   Grooming Score 0   Dressing Score 5   Bladder Score 10   Bowels Score 10   Toilet Use Score 5   Transfers (Bed/Chair) Score 10   Mobility (Level Surface) Score 0   Stairs Score 5   Barthel Index Score 55   Additional Treatment Session   Start Time 0920   End Time 0930   Treatment Assessment S:  "I feel fine" O:  Sit to stand from bedside chair with supervision, pt ambulated with min assist progressing to supervision x 100 feet  Gait is mildly antalgic on the L however pt reports this is chronic  A:  Improving quality of gait with practice  Anticipate with continued mobilization from PT and staff pt will return to baseline level of function  P:  continue PT progressing ambulation disance  End of Consult   Patient Position at End of Consult All needs within reach;Bed/Chair alarm activated; Bedside chair   Licensure   [facility] License Number  [place]  [id number]

## 2022-08-16 NOTE — ASSESSMENT & PLAN NOTE
Creatine 1 4, baseline 1 0-1 3   Patient has been on IV Lasix which will be transitioned to Lasix 20 milligram p o   Daily from tomorrow   Avoid nephrotoxins and hypotension   Creatinine has improved  Results from last 7 days   Lab Units 08/16/22  0436 08/15/22  0448 08/14/22  0606 08/14/22  0027   BUN mg/dL 27* 28* 25 27*   CREATININE mg/dL 1 19 1 44* 1 30 1 40*

## 2022-08-16 NOTE — PLAN OF CARE
Problem: PAIN - ADULT  Goal: Verbalizes/displays adequate comfort level or baseline comfort level  Description: Interventions:  - Encourage patient to monitor pain and request assistance  - Assess pain using appropriate pain scale  - Administer analgesics based on type and severity of pain and evaluate response  - Implement non-pharmacological measures as appropriate and evaluate response  - Consider cultural and social influences on pain and pain management  - Notify physician/advanced practitioner if interventions unsuccessful or patient reports new pain  Outcome: Progressing     Problem: INFECTION - ADULT  Goal: Absence or prevention of progression during hospitalization  Description: INTERVENTIONS:  - Assess and monitor for signs and symptoms of infection  - Monitor lab/diagnostic results  - Monitor all insertion sites, i e  indwelling lines,    - Administer medications as ordered  - Instruct and encourage patient and family to use good hand hygiene technique    Outcome: Progressing  Goal: Absence of fever/infection during neutropenic period  Description: INTERVENTIONS:  - Monitor WBC    Outcome: Progressing     Problem: SAFETY ADULT  Goal: Patient will remain free of falls  Description: INTERVENTIONS:  - Educate patient/family on patient safety including physical limitations  - Instruct patient to call for assistance with activity   - Consult OT/PT to assist with strengthening/mobility   - Keep Call bell within reach  - Keep bed low and locked with side rails adjusted as appropriate  - Keep care items and personal belongings within reach  - Initiate and maintain comfort rounds  - Make Fall Risk Sign visible to staff  - Offer Toileting every 2 Hours, in advance of need  - Initiate/Maintain bed/ chair alarm    - Apply yellow socks and bracelet for high fall risk patients  - Consider moving patient to room near nurses station  Outcome: Progressing  Goal: Maintain or return to baseline ADL function  Description: INTERVENTIONS:  -  Assess patient's ability to carry out ADLs; assess patient's baseline for ADL function and identify physical deficits which impact ability to perform ADLs (bathing, care of mouth/teeth, toileting, grooming, dressing, etc )  - Assess/evaluate cause of self-care deficits   - Assess range of motion  - Assess patient's mobility; develop plan if impaired  - Assess patient's need for assistive devices and provide as appropriate  - Encourage maximum independence but intervene and supervise when necessary  - Involve family in performance of ADLs  - Assess for home care needs following discharge   - Consider OT consult to assist with ADL evaluation and planning for discharge  - Provide patient education as appropriate  Outcome: Progressing  Goal: Maintains/Returns to pre admission functional level  Description: INTERVENTIONS:  - Perform BMAT or MOVE assessment daily    - Set and communicate daily mobility goal to care team and patient/family/caregiver     - Collaborate with rehabilitation services on mobility goals if consulted    - Reposition patient every 2 hours  - Ambulate patient 3 times a day  - Out of bed to chair 3 times a day  - Out of bed for toileting  - Record patient progress and toleration of activity level   Outcome: Progressing     Problem: DISCHARGE PLANNING  Goal: Discharge to home or other facility with appropriate resources  Description: INTERVENTIONS:  - Identify barriers to discharge w/patient and caregiver  - Arrange for needed discharge resources and transportation as appropriate  - Identify discharge learning needs (meds, wound care, etc )  - Arrange for interpretive services to assist at discharge as needed  - Refer to Case Management Department for coordinating discharge planning if the patient needs post-hospital services based on physician/advanced practitioner order or complex needs related to functional status, cognitive ability, or social support system  Outcome: Progressing     Problem: Knowledge Deficit  Goal: Patient/family/caregiver demonstrates understanding of disease process, treatment plan, medications, and discharge instructions  Description: Complete learning assessment and assess knowledge base    Interventions:  - Provide teaching at level of understanding  - Provide teaching via preferred learning methods  Outcome: Progressing     Problem: Potential for Falls  Goal: Patient will remain free of falls  Description: INTERVENTIONS:  - Educate patient/family on patient safety including physical limitations  - Instruct patient to call for assistance with activity   - Consult OT/PT to assist with strengthening/mobility   - Keep Call bell within reach  - Keep bed low and locked with side rails adjusted as appropriate  - Keep care items and personal belongings within reach  - Initiate and maintain comfort rounds  - Make Fall Risk Sign visible to staff  - Offer Toileting every 2 Hours, in advance of need  - Initiate/Maintain bed alarm    - Apply yellow socks and bracelet for high fall risk patients  - Consider moving patient to room near nurses station  Outcome: Progressing

## 2022-08-17 LAB
ANION GAP SERPL CALCULATED.3IONS-SCNC: 10 MMOL/L (ref 4–13)
BUN SERPL-MCNC: 26 MG/DL (ref 5–25)
CALCIUM SERPL-MCNC: 8.6 MG/DL (ref 8.3–10.1)
CHLORIDE SERPL-SCNC: 101 MMOL/L (ref 96–108)
CO2 SERPL-SCNC: 29 MMOL/L (ref 21–32)
CREAT SERPL-MCNC: 1.2 MG/DL (ref 0.6–1.3)
GFR SERPL CREATININE-BSD FRML MDRD: 42 ML/MIN/1.73SQ M
GLUCOSE SERPL-MCNC: 106 MG/DL (ref 65–140)
POTASSIUM SERPL-SCNC: 3.8 MMOL/L (ref 3.5–5.3)
SODIUM SERPL-SCNC: 140 MMOL/L (ref 135–147)

## 2022-08-17 PROCEDURE — 97530 THERAPEUTIC ACTIVITIES: CPT

## 2022-08-17 PROCEDURE — 99232 SBSQ HOSP IP/OBS MODERATE 35: CPT | Performed by: INTERNAL MEDICINE

## 2022-08-17 PROCEDURE — 80048 BASIC METABOLIC PNL TOTAL CA: CPT | Performed by: INTERNAL MEDICINE

## 2022-08-17 RX ORDER — DIGOXIN 0.25 MG/ML
125 INJECTION INTRAMUSCULAR; INTRAVENOUS ONCE
Status: COMPLETED | OUTPATIENT
Start: 2022-08-17 | End: 2022-08-17

## 2022-08-17 RX ORDER — METOPROLOL TARTRATE 5 MG/5ML
2.5 INJECTION INTRAVENOUS EVERY 6 HOURS PRN
Status: DISCONTINUED | OUTPATIENT
Start: 2022-08-17 | End: 2022-08-19 | Stop reason: HOSPADM

## 2022-08-17 RX ORDER — METOPROLOL TARTRATE 100 MG/1
100 TABLET ORAL EVERY 12 HOURS SCHEDULED
Status: DISCONTINUED | OUTPATIENT
Start: 2022-08-17 | End: 2022-08-19 | Stop reason: HOSPADM

## 2022-08-17 RX ADMIN — ATORVASTATIN CALCIUM 10 MG: 10 TABLET, FILM COATED ORAL at 08:26

## 2022-08-17 RX ADMIN — FUROSEMIDE 20 MG: 20 TABLET ORAL at 08:26

## 2022-08-17 RX ADMIN — METOPROLOL TARTRATE 100 MG: 100 TABLET, FILM COATED ORAL at 08:26

## 2022-08-17 RX ADMIN — SPIRONOLACTONE 12.5 MG: 25 TABLET ORAL at 08:26

## 2022-08-17 RX ADMIN — RIVAROXABAN 15 MG: 15 TABLET, FILM COATED ORAL at 17:38

## 2022-08-17 RX ADMIN — METOPROLOL TARTRATE 100 MG: 100 TABLET, FILM COATED ORAL at 20:29

## 2022-08-17 RX ADMIN — LOSARTAN POTASSIUM 25 MG: 25 TABLET, FILM COATED ORAL at 08:27

## 2022-08-17 RX ADMIN — DIGOXIN 125 MCG: 0.25 INJECTION INTRAMUSCULAR; INTRAVENOUS at 09:33

## 2022-08-17 RX ADMIN — DIGOXIN 125 MCG: 0.25 INJECTION INTRAMUSCULAR; INTRAVENOUS at 04:40

## 2022-08-17 NOTE — CASE MANAGEMENT
Case Management Assessment & Discharge Planning Note    Patient name Manjula Flight  Location [address] [facility] 407-* MRN [id number]  : 1940 Date 2022       Current Admission Date: 2022  Current Admission Diagnosis:Acute combined systolic and diastolic congestive heart failure [facility])   Patient Active Problem List    Diagnosis Date Noted    Acute combined systolic and diastolic congestive heart failure ([facility] ) 2022    Atrial fibrillation with RVR ([facility] ) 2022    JUANITA (acute kidney injury) ([facility] ) 2022    Hyperlipemia 2022      LOS (days): 3  Geometric Mean LOS (GMLOS) (days): 3 80  Days to GMLOS:0 3     OBJECTIVE:    Risk of Unplanned Readmission Score: 9 96         Current admission status: Inpatient       Preferred Pharmacy:   [address] [place] [id number]  Phone: [phone] Fax: [phone]    Primary Care Provider: Kayode Lockhart MD    Primary Insurance: [facility]  Secondary Insurance:     ASSESSMENT:    Readmission Root Cause  30 Day Readmission: No    Patient Information  Admitted from[de-identified] Home  Mental Status: Alert  During Assessment patient was accompanied by: Not accompanied during assessment  Assessment information provided by[de-identified] Patient  Primary Caregiver: Self  Support Systems: Stephanie Mccarthy 61 of Residence: [address] [place] [address] do you live in?: [address]  entry access options   Select all that apply : Stairs  Number of steps to enter home : 4  Do the steps have railings?: Yes  Type of Current Residence: 2 story home  Upon entering residence, is there a bedroom on the main floor (no further steps)?: No  A bedroom is located on the following floor levels of residence (select all that apply):: 2nd Floor  Upon entering residence, is there a bathroom on the main floor (no further steps)?: No  Indicate which floors of current residence have a bathroom (select all the apply):: 2nd Floor  Number of steps to 2nd floor from main floor: One Flight  In the last 12 months, was there a time when you were not able to pay the mortgage or rent on time?: No  In the last 12 months, how many places have you lived?: 1  In the last 12 months, was there a time when you did not have a steady place to sleep or slept in a shelter (including now)?: No  Homeless/housing insecurity resource given?: No  Living Arrangements: Lives Alone    Activities of Daily Living Prior to Admission  Functional Status: Independent  Completes ADLs independently?: Yes  Ambulates independently?: Yes  Does patient use assisted devices?: Yes  Assisted Devices (DME) used: Bedside Commode, Walker  Does patient currently own DME?: Yes  What DME does the patient currently own?: Walker, Bedside Commode  Does patient have a history of Outpatient Therapy (PT/OT)?: No  Does the patient have a history of Short-Term Rehab?: No  Does patient have a history of HHC?: No  Does patient currently have [facility]?: No    Patient Information Continued  Income Source: Pension/[employer]  Does patient have prescription coverage?: Yes  Within the past 12 months, you worried that your food would run out before you got the money to buy more : Never true  Within the past 12 months, the food you bought just didn't last and you didn't have money to get more : Never true  Food insecurity resource given?: No  Does patient receive dialysis treatments?: No  Does patient have a history of substance abuse?: No  Does patient have a history of Mental Health Diagnosis?: No    PHQ 2/9 Screening   Reviewed PHQ 2/9 Depression Screening Score?: No    Means of Transportation  Means of Transport to Appts[de-identified] Drives Self  In the past 12 months, has lack of transportation kept you from medical appointments or from getting medications?: No  In the past 12 months, has lack of transportation kept you from meetings, work, or from getting things needed for daily living?: No  Was application for public transport provided?: No    DISCHARGE DETAILS:    Discharge planning discussed with[de-identified] Patient  Freedom of Choice: Yes  Comments - Freedom of Choice: Choice is to D/C home, refusing HHC/VNA at this time  CM contacted family/caregiver?: No- see comments (Patient declined call to friend Yolette Allen stating she does not want to worry her   CM encouraged patient to communicate with support systems regarding DCP )  Were Treatment Team discharge recommendations reviewed with patient/caregiver?: Yes  Did patient/caregiver verbalize understanding of patient care needs?: Yes  Were patient/caregiver advised of the risks associated with not following Treatment Team discharge recommendations?: Yes    [address]         Is the patient interested in [facility] at discharge?: No (REFUSING)    DME Referral Provided  Referral made for DME?: No    Would you like to participate in our [address] service program?  : No - Declined    Treatment Team Recommendation: [facility] (REFUSING)  Discharge Destination Plan[de-identified] Home

## 2022-08-17 NOTE — ASSESSMENT & PLAN NOTE
Wt Readings from Last 3 Encounters:   08/17/22 49 8 kg (109 lb 12 6 oz)   08/22/21 55 8 kg (123 lb)     Patient presents with worsening edema x2 weeks,   · Started on HCTZ 12 5mg by PCP last week without improvement   · BNP 2787  · Troponin trend negative  Echo showed EF of 54-42% with diastolic dysfunction with severely dilated left atrium right atrium  Echo from 2020 showed EF of 30-35%  The patient received IV Lasix with very good diuresis  Continue Lasix 20 milligram p o  Every daily  Aldactone 12 5 milligram p o  Daily, losartan 25 milligram p o   Daily

## 2022-08-17 NOTE — PROGRESS NOTES
Progress Note - Cardiology   [facility]     Dallin Bass 80 y o  female MRN: [id number]  : 1940  Unit/Bed#: [address] [facility] 407- Encounter: [id number]    Assessment and Plan:   1  Acute on chronic combined systolic and diastolic heart failure:  Echo demonstrates EF of around 30%  -  continue Lopressor to 100 mg b i d     -  discontinue IV Lasix and transition to Lasix 40 mg p o  Starting 2022    -  initiate Aldactone 12 5 mg once a day with Cozaar 25 mg daily    -  close monitoring of I&O, daily weights electrolytes    -  low-sodium diet    -  CHF education     2  Chronic atrial fibrillation:  Patient's heart rates still not well controlled, but slowly improving    -  continue Lopressor to 100 mg b i d  And add p r n  Lopressor 2 5 mg IV p r n  for heart rate greater than 120    -  patient received digoxin load of 0 25 mg x 1 and 0 125 mg x3 (last dose at 10:00 a m  This morning)    -  continue Xarelto 50 mg daily     3  Dyslipidemia:  Continue Lipitor mg daily     4  Acute kidney injury on chronic kidney disease:  Baseline creatinine 1-1 3    -  creatinine today is 1 2    -  most likely due to rapid atrial fibrillation    Subjective / Objective:   Patient seen and examined  She is in good spirits today  No complaints offered  Discussed with her medication changes and she is agreeable  Vitals: Blood pressure 121/74, pulse (!) 117, temperature (!) 97 2 °F (36 2 °C), resp  rate 14, height 4' 11" (1 499 m), weight 49 8 kg (109 lb 12 6 oz), SpO2 95 %  Vitals:    22 0539 22 0557   Weight: 51 5 kg (113 lb 8 oz) 49 8 kg (109 lb 12 6 oz)     Body mass index is 22 17 kg/m²    BP Readings from Last 3 Encounters:   22 121/74   21 142/100     Orthostatic Blood Pressures    Flowsheet Row Most Recent Value   Blood Pressure 121/74 filed at 2022 2491   Patient Position - Orthostatic VS Lying filed at 2022 0200        I/O       08/15 0701   07 08/16 0701  08/17 0700 08/17 0701  08/18 0700    P  O  50 450     Total Intake(mL/kg) 50 (1) 450 (9)     Urine (mL/kg/hr) 800 (0 6) 650 (0 5)     Stool  0     Total Output 800 650     Net -750 -200            Unmeasured Stool Occurrence  1 x         Invasive Devices  Report    Peripheral Intravenous Line  Duration           Peripheral IV 08/15/22 Distal;Right;Dorsal (posterior) Forearm 1 day                  Intake/Output Summary (Last 24 hours) at 8/17/2022 0900  Last data filed at 8/16/2022 1901  Gross per 24 hour   Intake 300 ml   Output 650 ml   Net -350 ml         Physical Exam:   Physical Exam  Vitals and nursing note reviewed  Constitutional:       Appearance: Normal appearance  HENT:      Head: Normocephalic  Right Ear: External ear normal       Left Ear: External ear normal    Eyes:      General: No scleral icterus  Right eye: No discharge  Left eye: No discharge  Cardiovascular:      Rate and Rhythm: Tachycardia present  Rhythm irregular  Pulses: Normal pulses  Heart sounds: Normal heart sounds  Pulmonary:      Effort: Pulmonary effort is normal  No respiratory distress  Breath sounds: Normal breath sounds  Abdominal:      General: Bowel sounds are normal  There is no distension  Palpations: Abdomen is soft  Musculoskeletal:      Right lower leg: No edema  Left lower leg: No edema  Skin:     General: Skin is warm and dry  Capillary Refill: Capillary refill takes less than 2 seconds  Neurological:      General: No focal deficit present  Mental Status: She is alert and oriented to person, place, and time  Mental status is at baseline     Psychiatric:         Mood and Affect: Mood normal                    Medications/ Allergies:     Current Facility-Administered Medications   Medication Dose Route Frequency Provider Last Rate    acetaminophen  650 mg Oral Q6H PRN Cara Infante PA-C      atorvastatin  10 mg Oral Daily Cara Infante ADEEL      furosemide  20 mg Oral Daily Vik Sanders MD      losartan  25 mg Oral Daily Vik Sanders MD      metoprolol tartrate  100 mg Oral Q12H [address] [place] Fees, CRNP      ondansetron  4 mg Intravenous Q6H PRN Lanre Jara PA-C      rivaroxaban  15 mg Oral QPM Amelia Penn PA-C      spironolactone  12 5 mg Oral Daily Vik Sanders MD       acetaminophen, 650 mg, Q6H PRN  ondansetron, 4 mg, Q6H PRN      No Known Allergies    VTE Pharmacologic Prophylaxis:   Sequential compression device (Venodyne)     Labs:   Troponins:  Results from last 7 days   Lab Units 08/14/22  0225   HSTNI D2 ng/L 0     CBC with diff:  Results from last 7 days   Lab Units 08/16/22  0436 08/15/22  0448 08/14/22  0606 08/14/22  0027   WBC Thousand/uL 6 56 5 15 6 41 7 62   HEMOGLOBIN g/dL 15 0 13 6 13 7 14 5   HEMATOCRIT % 45 2 42 2 41 8 44 7   MCV fL 95 96 95 96   PLATELETS Thousands/uL 285 281 284 313   MCH pg 31 4 31 0 31 2 31 0   MCHC g/dL 33 2 32 2 32 8 32 4   RDW % 12 9 12 8 12 8 13 0   MPV fL 10 0 9 9 9 5 10 0   NRBC AUTO /100 WBCs 0 0 0 0     CMP:  Results from last 7 days   Lab Units 08/17/22  0439 08/16/22  0436 08/15/22  0448 08/14/22  0606 08/14/22  0027   SODIUM mmol/L 140 139 145 142 142   POTASSIUM mmol/L 3 8 4 3 3 8 4 2 4 0   CHLORIDE mmol/L 101 100 105 103 103   CO2 mmol/L 29 30 33* 30 27   ANION GAP mmol/L 10 9 7 9 12   BUN mg/dL 26* 27* 28* 25 27*   CREATININE mg/dL 1 20 1 19 1 44* 1 30 1 40*   CALCIUM mg/dL 8 6 8 6 8 4 9 1 9 4   AST U/L  --   --  16  --  21   ALT U/L  --   --  13  --  15   ALK PHOS U/L  --   --  100  --  123*   TOTAL PROTEIN g/dL  --   --  6 5  --  8 1   ALBUMIN g/dL  --   --  2 9*  --  3 9   TOTAL BILIRUBIN mg/dL  --   --  0 73  --  0 76   EGFR ml/min/1 73sq m 42 42 33 38 35     Coags:  Results from last 7 days   Lab Units 08/14/22  0027   PTT seconds 31   INR  2 03*       Imaging & Testing   I have personally reviewed pertinent reports      XR chest 1 view portable    Result Date: 8/14/2022  Narrative: CHEST INDICATION:   swelling  COMPARISON:  Chest radiograph from 11/6/2015  EXAM PERFORMED/VIEWS:  XR CHEST PORTABLE FINDINGS: Cardiomediastinal silhouette appears unremarkable  The lungs are clear  No pneumothorax or pleural effusion  Osseous structures appear within normal limits for patient age  Impression: No acute cardiopulmonary disease  Workstation performed: [id number]     Echo complete w/ contrast if indicated    Result Date: 8/15/2022  Narrative: Sana Velasco  Left Ventricle: Left ventricular cavity size is normal  Wall thickness is normal  Systolic function is severely reduced  Estimated ejection fraction is 25-30%  Wall motion cannot be accurately assessed  Diastolic dysfunction, grade indeterminate due to underlying atrial fibrillation    Right Ventricle: Systolic function is reduced    Left Atrium: The atrium is severely dilated    Right Atrium: The atrium is moderately dilated    Mitral Valve: There is annular calcification  There is mild regurgitation    Tricuspid Valve: There is mild regurgitation  The right ventricular systolic pressure is mildly elevated  EKG / Monitor: Personally reviewed  Rapid atrial fibrillation      Kayce Wheeler, [facility]   Cardiology      "This note has been constructed using a voice recognition system  Therefore there may be syntax, spelling, and/or grammatical errors   Please call if you have any questions  "

## 2022-08-17 NOTE — ASSESSMENT & PLAN NOTE
Noted to be in a fib RVR on arrival, HR between 110-150s  · Given metoprolol 10mg in ED, cardizem bolus  · Patient has been on Cardizem drip at 15 milligram/hour which was later discontinued as blood pressure was on the soft side heart rate was low    Patient was later started on IV amiodarone loading which was discontinued as patient has chronic atrial fibrillation  · Patient is on anticoagulation with Xarelto  · Cardiology input appreciated  · Patient was loaded with digoxin- patient received 0 25 milligrams tablets 1 followed by 0 125 milligrams x 3  · Patient is on Lopressor which was increased to 100 milligram p o  B i d

## 2022-08-17 NOTE — PLAN OF CARE
Problem: PAIN - ADULT  Goal: Verbalizes/displays adequate comfort level or baseline comfort level  Description: Interventions:  - Encourage patient to monitor pain and request assistance  - Assess pain using appropriate pain scale  - Administer analgesics based on type and severity of pain and evaluate response  - Implement non-pharmacological measures as appropriate and evaluate response  - Consider cultural and social influences on pain and pain management  - Notify physician/advanced practitioner if interventions unsuccessful or patient reports new pain  Outcome: Progressing     Problem: SAFETY ADULT  Goal: Patient will remain free of falls  Description: INTERVENTIONS:  - Educate patient/family on patient safety including physical limitations  - Instruct patient to call for assistance with activity   - Consult OT/PT to assist with strengthening/mobility   - Keep Call bell within reach  - Keep bed low and locked with side rails adjusted as appropriate  - Keep care items and personal belongings within reach  - Initiate and maintain comfort rounds  - Make Fall Risk Sign visible to staff  - Offer Toileting every 2 Hours, in advance of need  - Initiate/Maintain bed alarm    - Apply yellow socks and bracelet for high fall risk patients  - Consider moving patient to room near nurses station  Outcome: Progressing  Goal: Maintain or return to baseline ADL function  Description: INTERVENTIONS:  -  Assess patient's ability to carry out ADLs; assess patient's baseline for ADL function and identify physical deficits which impact ability to perform ADLs (bathing, care of mouth/teeth, toileting, grooming, dressing, etc )  - Assess/evaluate cause of self-care deficits   - Assess range of motion  - Assess patient's mobility; develop plan if impaired  - Assess patient's need for assistive devices and provide as appropriate  - Encourage maximum independence but intervene and supervise when necessary  - Involve family in performance of ADLs  - Assess for home care needs following discharge   - Consider OT consult to assist with ADL evaluation and planning for discharge  - Provide patient education as appropriate  Outcome: Progressing  Goal: Maintains/Returns to pre admission functional level  Description: INTERVENTIONS:  - Perform BMAT or MOVE assessment daily    - Set and communicate daily mobility goal to care team and patient/family/caregiver  - Collaborate with rehabilitation services on mobility goals if consulted  - Ambulate patient 2  times a day  - Out of bed to chair 3  times a day     - Out of bed for toileting  - Record patient progress and toleration of activity level   Outcome: Progressing

## 2022-08-17 NOTE — ASSESSMENT & PLAN NOTE
Creatine 1 4, baseline 1 0-1 3   Patient has been on IV Lasix which will be transitioned to Lasix 20 milligram p o   Daily from tomorrow   Avoid nephrotoxins and hypotension   Creatinine has improved and continues to remain stable  Results from last 7 days   Lab Units 08/17/22  0439 08/16/22  0436 08/15/22  0448 08/14/22  0606 08/14/22  0027   BUN mg/dL 26* 27* 28* 25 27*   CREATININE mg/dL 1 20 1 19 1 44* 1 30 1 40*

## 2022-08-17 NOTE — PROGRESS NOTES
[address]  Progress Note - Thena Bras 1940, 80 y o  female MRN: [id number]  Unit/Bed#: [address] [facility] Encounter: [id number]  Primary Care Provider: Jacki Maria MD   Date and time admitted to hospital: 8/14/2022 12:04 AM    * Acute combined systolic and diastolic congestive heart failure ([facility] )  Assessment & Plan  Wt Readings from Last 3 Encounters:   08/17/22 49 8 kg (109 lb 12 6 oz)   08/22/21 55 8 kg (123 lb)     Patient presents with worsening edema x2 weeks,   · Started on HCTZ 12 5mg by PCP last week without improvement   · BNP 2787  · Troponin trend negative  Echo showed EF of 61-87% with diastolic dysfunction with severely dilated left atrium right atrium  Echo from 2020 showed EF of 30-35%  The patient received IV Lasix with very good diuresis  Continue Lasix 20 milligram p o  Every daily  Aldactone 12 5 milligram p o  Daily, losartan 25 milligram p o  Daily             Atrial fibrillation with RVR ([facility])  Assessment & Plan  Noted to be in a fib RVR on arrival, HR between 110-150s  · Given metoprolol 10mg in ED, cardizem bolus  · Patient has been on Cardizem drip at 15 milligram/hour which was later discontinued as blood pressure was on the soft side heart rate was low  Patient was later started on IV amiodarone loading which was discontinued as patient has chronic atrial fibrillation  · Patient is on anticoagulation with Xarelto  · Cardiology input appreciated  · Patient was loaded with digoxin- patient received 0 25 milligrams tablets 1 followed by 0 125 milligrams x 3  · Patient is on Lopressor which was increased to 100 milligram p o  B i d  JUANITA (acute kidney injury) ([facility] )  Assessment & Plan  Creatine 1 4, baseline 1 0-1 3   Patient has been on IV Lasix which will be transitioned to Lasix 20 milligram p o   Daily from tomorrow   Avoid nephrotoxins and hypotension   Creatinine has improved and continues to remain stable  Results from last 7 days Lab Units 22  0439 22  0436 08/15/22  0448 22  0606 22  0027   BUN mg/dL 26* 27* 28* 25 27*   CREATININE mg/dL 1 20 1 19 1 44* 1 30 1 40*         Hyperlipemia  Assessment & Plan  Continue atorvastatin         VTE Pharmacologic Prophylaxis: VTE Score: 4 Moderate Risk (Score 3-4) - Pharmacological DVT Prophylaxis Ordered: rivaroxaban (Xarelto)  Patient Centered Rounds: I performed bedside rounds with nursing staff today  Discussions with Specialists or Other Care Team Provider: Dr Larisa Melendrez    Education and Discussions with Family / Patient: Patient declined call to   Time Spent for Care: 45 minutes  More than 50% of total time spent on counseling and coordination of care as described above  Current Length of Stay: 3 day(s)  Current Patient Status: Inpatient   Certification Statement: The patient will continue to require additional inpatient hospital stay due to Atrial fibrillation and CHF  Discharge Plan: Anticipate discharge in 24-48 hrs to home  Code Status: Level 3 - DNAR and DNI    Subjective:   Patient is feeling well  Denies any chest pain, palpitations or shortness of breath  Objective:     Vitals:   Temp (24hrs), Av 5 °F (36 4 °C), Min:97 2 °F (36 2 °C), Max:97 8 °F (36 6 °C)    Temp:  [97 2 °F (36 2 °C)-97 8 °F (36 6 °C)] 97 6 °F (36 4 °C)  HR:  [] 93  Resp:  [14-18] 16  BP: (109-122)/(74-88) 119/74  SpO2:  [92 %-96 %] 96 %  Body mass index is 22 17 kg/m²  Input and Output Summary (last 24 hours): Intake/Output Summary (Last 24 hours) at 2022 1628  Last data filed at 2022 1901  Gross per 24 hour   Intake 100 ml   Output 250 ml   Net -150 ml       Physical Exam:   Physical Exam  Constitutional:       Appearance: Normal appearance  HENT:      Head: Normocephalic and atraumatic  Nose: Nose normal       Mouth/Throat:      Mouth: Mucous membranes are moist       Pharynx: Oropharynx is clear     Eyes:      Extraocular Movements: Extraocular movements intact  Pupils: Pupils are equal, round, and reactive to light  Cardiovascular:      Rate and Rhythm: Normal rate  Rhythm irregular  Pulmonary:      Effort: Pulmonary effort is normal       Breath sounds: Rales present  Comments: Bibasilar crackles  Abdominal:      General: Bowel sounds are normal  There is no distension  Palpations: Abdomen is soft  Tenderness: There is no abdominal tenderness  Musculoskeletal:         General: No swelling  Cervical back: Normal range of motion and neck supple  Skin:     General: Skin is warm and dry  Neurological:      General: No focal deficit present  Mental Status: She is alert  Additional Data:     Labs:  Results from last 7 days   Lab Units 08/16/22  0436   WBC Thousand/uL 6 56   HEMOGLOBIN g/dL 15 0   HEMATOCRIT % 45 2   PLATELETS Thousands/uL 285   NEUTROS PCT % 67   LYMPHS PCT % 24   MONOS PCT % 6   EOS PCT % 2     Results from last 7 days   Lab Units 08/17/22  0439 08/16/22  0436 08/15/22  0448   SODIUM mmol/L 140   < > 145   POTASSIUM mmol/L 3 8   < > 3 8   CHLORIDE mmol/L 101   < > 105   CO2 mmol/L 29   < > 33*   BUN mg/dL 26*   < > 28*   CREATININE mg/dL 1 20   < > 1 44*   ANION GAP mmol/L 10   < > 7   CALCIUM mg/dL 8 6   < > 8 4   ALBUMIN g/dL  --   --  2 9*   TOTAL BILIRUBIN mg/dL  --   --  0 73   ALK PHOS U/L  --   --  100   ALT U/L  --   --  13   AST U/L  --   --  16   GLUCOSE RANDOM mg/dL 106   < > 114    < > = values in this interval not displayed       Results from last 7 days   Lab Units 08/14/22  0027   INR  2 03*                   Lines/Drains:  Invasive Devices  Report    Peripheral Intravenous Line  Duration           Peripheral IV 08/15/22 Distal;Right;Dorsal (posterior) Forearm 1 day                  Telemetry:  Telemetry Orders (From admission, onward)             48 Hour Telemetry Monitoring  Continuous x 48 hours        References:    Telemetry Guidelines   Question:  Reason for 48 Hour Telemetry  Answer:  Arrhythmias Requiring Medical Therapy (eg  SVT, Vtach/fib, Bradycardia, Uncontrolled A-fib)                 Telemetry Reviewed: Atrial fibrillation  HR averaging   Indication for Continued Telemetry Use: Arrthymias requiring medical therapy             Imaging: Reviewed radiology reports from this admission including: chest xray    Recent Cultures (last 7 days):         Last 24 Hours Medication List:   Current Facility-Administered Medications   Medication Dose Route Frequency Provider Last Rate    acetaminophen  650 mg Oral Q6H PRN Mia GrandchildADEEL      atorvastatin  10 mg Oral Daily Miamercy MendozanavdeepADEEL      furosemide  20 mg Oral Daily Linda Amaya MD      losartan  25 mg Oral Daily Linda Amaya MD      metoprolol  2 5 mg Intravenous Q6H PRN SUMA Rodríguez      metoprolol tartrate  100 mg Oral Q12H Racheal Blackwell MD      ondansetron  4 mg Intravenous Q6H PRN Mia Alvarez PA-C      rivaroxaban  15 mg Oral QPM Ameliakanika Penn PA-C      spironolactone  12 5 mg Oral Daily Linda Amaya MD          Today, Patient Was Seen By: Micheal Casarez MD    **Please Note: This note may have been constructed using a voice recognition system  **

## 2022-08-18 LAB
ANION GAP SERPL CALCULATED.3IONS-SCNC: 10 MMOL/L (ref 4–13)
BUN SERPL-MCNC: 32 MG/DL (ref 5–25)
CALCIUM SERPL-MCNC: 8.6 MG/DL (ref 8.3–10.1)
CHLORIDE SERPL-SCNC: 102 MMOL/L (ref 96–108)
CO2 SERPL-SCNC: 28 MMOL/L (ref 21–32)
CREAT SERPL-MCNC: 1.13 MG/DL (ref 0.6–1.3)
GFR SERPL CREATININE-BSD FRML MDRD: 45 ML/MIN/1.73SQ M
GLUCOSE SERPL-MCNC: 112 MG/DL (ref 65–140)
POTASSIUM SERPL-SCNC: 3.8 MMOL/L (ref 3.5–5.3)
SODIUM SERPL-SCNC: 140 MMOL/L (ref 135–147)

## 2022-08-18 PROCEDURE — 99232 SBSQ HOSP IP/OBS MODERATE 35: CPT | Performed by: INTERNAL MEDICINE

## 2022-08-18 PROCEDURE — 80048 BASIC METABOLIC PNL TOTAL CA: CPT | Performed by: INTERNAL MEDICINE

## 2022-08-18 PROCEDURE — 97530 THERAPEUTIC ACTIVITIES: CPT

## 2022-08-18 RX ORDER — DIGOXIN 125 MCG
125 TABLET ORAL EVERY OTHER DAY
Status: DISCONTINUED | OUTPATIENT
Start: 2022-08-18 | End: 2022-08-19

## 2022-08-18 RX ORDER — FUROSEMIDE 20 MG/1
20 TABLET ORAL EVERY OTHER DAY
Status: DISCONTINUED | OUTPATIENT
Start: 2022-08-20 | End: 2022-08-19 | Stop reason: HOSPADM

## 2022-08-18 RX ORDER — FUROSEMIDE 20 MG/1
20 TABLET ORAL EVERY OTHER DAY
Status: DISCONTINUED | OUTPATIENT
Start: 2022-08-19 | End: 2022-08-18

## 2022-08-18 RX ORDER — SPIRONOLACTONE 25 MG/1
12.5 TABLET ORAL EVERY OTHER DAY
Status: DISCONTINUED | OUTPATIENT
Start: 2022-08-19 | End: 2022-08-19 | Stop reason: HOSPADM

## 2022-08-18 RX ADMIN — FUROSEMIDE 20 MG: 20 TABLET ORAL at 09:22

## 2022-08-18 RX ADMIN — DIGOXIN 125 MCG: 125 TABLET ORAL at 11:44

## 2022-08-18 RX ADMIN — LOSARTAN POTASSIUM 25 MG: 25 TABLET, FILM COATED ORAL at 09:22

## 2022-08-18 RX ADMIN — SPIRONOLACTONE 12.5 MG: 25 TABLET ORAL at 09:22

## 2022-08-18 RX ADMIN — RIVAROXABAN 15 MG: 15 TABLET, FILM COATED ORAL at 18:15

## 2022-08-18 RX ADMIN — METOPROLOL TARTRATE 100 MG: 100 TABLET, FILM COATED ORAL at 09:19

## 2022-08-18 RX ADMIN — ATORVASTATIN CALCIUM 10 MG: 10 TABLET, FILM COATED ORAL at 09:18

## 2022-08-18 RX ADMIN — METOPROLOL TARTRATE 100 MG: 100 TABLET, FILM COATED ORAL at 20:21

## 2022-08-18 NOTE — PHYSICAL THERAPY NOTE
PT TREATMENT     08/18/22 1420   Note Type   Note Type Treatment   Pain Assessment   Pain Assessment Tool 0-10   Pain Score No Pain   Restrictions/Precautions   Other Precautions Fall Risk   General   Chart Reviewed Yes   Cognition   Overall Cognitive Status WFL   Subjective   Subjective agreeable to activity, feeling good   Bed Mobility   Supine to Sit 4  Minimal assistance   Sit to Supine 4  Minimal assistance   Transfers   Sit to Stand 5  Supervision   Stand to Sit 5  Supervision   Stand pivot 5  Supervision   Ambulation/Elevation   Gait pattern   (flexed posture, antalgic L LE due to LLD)   Gait Assistance 5  Supervision   Assistive Device   (with and without a walker)   Distance 2x200 feet with walker, 50 without walker   Balance   Static Sitting Fair +   Dynamic Sitting Fair +   Static Standing Fair +   Activity Tolerance   Activity Tolerance Patient tolerated treatment well   Assessment   Prognosis Good   Problem List Decreased strength; Impaired balance;Decreased endurance;Decreased mobility   Assessment Pt demonstrates daily improvement in endurance, balance, and function  Pt is able to ambulate with a generally steady gait with and without a walker on indoor level surfaces  Encouraged pt to sit OOB however pt reports she was OOB all night  Pt is ready to go home from the PT point of view, recommended home PT but pt declined  The patient's AM-PAC Basic Mobility Inpatient Short Form Raw Score is 21  A Raw score of greater than 16 suggests the patient may benefit from discharge to home  Plan   Treatment/Interventions Functional transfer training;LE strengthening/ROM; Therapeutic exercise; Endurance training;Gait training;Bed mobility; Equipment eval/education;Patient/family training   Progress Progressing toward goals   PT Frequency Other (Comment)  (5w)   Recommendation   PT Discharge Recommendation Home with home health rehabilitation   Equipment Recommended   (pt has a cane and a walker)   AM-PAC Basic Mobility Inpatient   Turning in Bed Without Bedrails 4   Lying on Back to Sitting on Edge of Flat Bed 3   Moving Bed to Chair 3   Standing Up From Chair 3   Walk in Room 3   Climb 3-5 Stairs 3   Basic Mobility Inpatient Raw Score 19   Basic Mobility Standardized Score 42 48   Highest Level Of Mobility   JH-HLM Goal 6: Walk 10 steps or more   JH-HLM Achieved 8: Walk 250 feet ot more   End of Consult   Patient Position at End of Consult All needs within reach; Supine;Bed/Chair alarm activated   [employer] License Number  Vearl Rough PT [id number]

## 2022-08-18 NOTE — PLAN OF CARE
Problem: SAFETY ADULT  Goal: Maintain or return to baseline ADL function  Description: INTERVENTIONS:  -  Assess patient's ability to carry out ADLs; assess patient's baseline for ADL function and identify physical deficits which impact ability to perform ADLs (bathing, care of mouth/teeth, toileting, grooming, dressing, etc )  - Assess/evaluate cause of self-care deficits   - Assess range of motion  - Assess patient's mobility; develop plan if impaired  - Assess patient's need for assistive devices and provide as appropriate  - Encourage maximum independence but intervene and supervise when necessary  - Involve family in performance of ADLs  - Assess for home care needs following discharge   - Consider OT consult to assist with ADL evaluation and planning for discharge  - Provide patient education as appropriate  Outcome: Progressing     Problem: CARDIOVASCULAR - ADULT  Goal: Absence of cardiac dysrhythmias or at baseline rhythm  Description: INTERVENTIONS:  - Continuous cardiac monitoring, vital signs, obtain 12 lead EKG if ordered  - Administer antiarrhythmic and heart rate control medications as ordered  - Monitor electrolytes and administer replacement therapy as ordered  Outcome: Progressing     Problem: CARDIOVASCULAR - ADULT  Goal: Maintains optimal cardiac output and hemodynamic stability  Description: INTERVENTIONS:  - Monitor I/O, vital signs and rhythm  - Monitor for S/S and trends of decreased cardiac output  - Administer and titrate ordered vasoactive medications to optimize hemodynamic stability  - Assess quality of pulses, skin color and temperature  - Assess for signs of decreased coronary artery perfusion  - Instruct patient to report change in severity of symptoms  Outcome: Progressing

## 2022-08-18 NOTE — PLAN OF CARE
Problem: PAIN - ADULT  Goal: Verbalizes/displays adequate comfort level or baseline comfort level  Description: Interventions:  - Encourage patient to monitor pain and request assistance  - Assess pain using appropriate pain scale  - Administer analgesics based on type and severity of pain and evaluate response  - Implement non-pharmacological measures as appropriate and evaluate response  - Consider cultural and social influences on pain and pain management  - Notify physician/advanced practitioner if interventions unsuccessful or patient reports new pain  Outcome: Progressing     Problem: INFECTION - ADULT  Goal: Absence or prevention of progression during hospitalization  Description: INTERVENTIONS:  - Assess and monitor for signs and symptoms of infection  - Monitor lab/diagnostic results  - Monitor all insertion sites, i e  indwelling lines, tubes, and drains  - Monitor endotracheal if appropriate and nasal secretions for changes in amount and color  - [place] appropriate cooling/warming therapies per order  - Administer medications as ordered  - Instruct and encourage patient and family to use good hand hygiene technique  - Identify and instruct in appropriate isolation precautions for identified infection/condition  Outcome: Progressing  Goal: Absence of fever/infection during neutropenic period  Description: INTERVENTIONS:  - Monitor WBC    Outcome: Progressing     Problem: SAFETY ADULT  Goal: Patient will remain free of falls  Description: INTERVENTIONS:  - Educate patient/family on patient safety including physical limitations  - Instruct patient to call for assistance with activity   - Consult OT/PT to assist with strengthening/mobility   - Keep Call bell within reach  - Keep bed low and locked with side rails adjusted as appropriate  - Keep care items and personal belongings within reach  - Initiate and maintain comfort rounds  - Make Fall Risk Sign visible to staff  - Offer Toileting every 3 Hours, in advance of need  - Initiate/Maintain bed alarm  - Apply yellow socks and bracelet for high fall risk patients  - Consider moving patient to room near nurses station  Outcome: Progressing  Goal: Maintain or return to baseline ADL function  Description: INTERVENTIONS:  -  Assess patient's ability to carry out ADLs; assess patient's baseline for ADL function and identify physical deficits which impact ability to perform ADLs (bathing, care of mouth/teeth, toileting, grooming, dressing, etc )  - Assess/evaluate cause of self-care deficits   - Assess range of motion  - Assess patient's mobility; develop plan if impaired  - Assess patient's need for assistive devices and provide as appropriate  - Encourage maximum independence but intervene and supervise when necessary  - Involve family in performance of ADLs  - Assess for home care needs following discharge   - Consider OT consult to assist with ADL evaluation and planning for discharge  - Provide patient education as appropriate  Outcome: Progressing  Goal: Maintains/Returns to pre admission functional level  Description: INTERVENTIONS:  - Perform BMAT or MOVE assessment daily    - Set and communicate daily mobility goal to care team and patient/family/caregiver  - Collaborate with rehabilitation services on mobility goals if consulted  - Perform Range of Motion 3  times a day    - Stand patient 3 times a day  - Ambulate patient 3  times a day  - Out of bed to chair 3  times a day   - Out of bed for meals 3 times a day  - Out of bed for toileting  - Record patient progress and toleration of activity level   Outcome: Progressing

## 2022-08-18 NOTE — PROGRESS NOTES
Progress Note - Cardiology   [facility]     Quique Irving 80 y o  female MRN: [id number]  : 1940  Unit/Bed#: [address] [place] [address] Encounter: [id number]    Assessment and Plan:     80-year-old woman with history of cardiomyopathy, chronic longstanding atrial fibrillation mostly managed with rate control though suboptimally controlled at this time, dyslipidemia, CRI was admitted with shortness of breath and AFib with rapid ventricular rate     She has been tried on beta-blocker, and digoxin     Assessment and plan          1  Atrial fibrillation with rapid ventricular rate  Her heart rate is difficult to control  Will use p r n  beta-blockers along with digoxin  Heart rate are slowly improving  Will add digoxin a on the p o  basis she may need at discharge digoxin twice a week and need to check digoxin level in 7-10 days        2  Acute on Chronic systolic and diastolic heart failure with decrease in EF now around 30% though not accurately able to assess EF due to atrial fibrillation   At this time there is no evidence of any volume overload  Her BUN creatinine slightly elevated will cut back on her diuretics and Aldactone  Will make it every other day to alternate with each other  Monitor electrolytes  3  Dyslipidemia  Continue statin     4  Shortness of breath  Much better now       5  Acute kidney injury  Patient's creatinine is improving  BUN slightly elevated doses of diuretics decreased  Monitor electrolytes  Plan  Patient appears to be euvolemic heart rate is getting better  We have changed her digoxin to p o  I believe at discharge she may need digoxin twice a week along with metoprolol  She will need diuretics at a lower dose and on alternate days  She need to follow up with electrolytes as well as digoxin level  If remains stable possible discharge from cardiac point of view tomorrow    Discussed with patient and medical team     Subjective / Objective: Patient seen and evaluated  Leg edema has significantly improved  She is breathing better denies any new complaints  Vitals: Blood pressure 110/68, pulse 95, temperature (!) 97 3 °F (36 3 °C), resp  rate 16, height 4' 11" (1 499 m), weight 49 8 kg (109 lb 12 6 oz), SpO2 97 %  Vitals:    08/16/22 0539 08/17/22 0557   Weight: 51 5 kg (113 lb 8 oz) 49 8 kg (109 lb 12 6 oz)     Body mass index is 22 17 kg/m²  BP Readings from Last 3 Encounters:   08/18/22 110/68   08/22/21 142/100     Orthostatic Blood Pressures    Flowsheet Row Most Recent Value   Blood Pressure 110/68 filed at 08/18/2022 2833   Patient Position - Orthostatic VS Lying filed at 08/14/2022 0200        I/O       08/16 0701  08/17 0700 08/17 0701  08/18 0700 08/18 0701  08/19 0700    P  O  450      Total Intake(mL/kg) 450 (9)      Urine (mL/kg/hr) 650 (0 5)      Stool 0      Total Output 650      Net -200             Unmeasured Stool Occurrence 1 x          Invasive Devices  Report    Peripheral Intravenous Line  Duration           Peripheral IV 08/15/22 Distal;Right;Dorsal (posterior) Forearm 2 days                No intake or output data in the 24 hours ending 08/18/22 1103      Physical Exam:   Physical Exam    Neurologic:  Alert & oriented x 3,  no focal deficits noted   Constitutional:  Thinly built not any distress  Eyes:  PERRL, conjunctiva normal   HENT:  Atraumatic, external ears normal, nose normal,   NECK: Normal range of motion, no tenderness, neck is supple , No JVP  Respiratory:  Bilateral air entry mostly clear to auscultation anteriorly with coarse breath sound at bases  Cardiovascular: S1-S2 regular with a 2/6 ejection systolic murmur  S4 is heard  GI:  Soft, nondistended, normal bowel sounds, nontender, no hepatosplenomegaly appreciated  Musculoskeletal:  She has kyphosis      Extremities:  No significant edema  Psychiatric:  Speech and behavior appropriate             Medications/ Allergies:     Current Facility-Administered Medications   Medication Dose Route Frequency Provider Last Rate    acetaminophen  650 mg Oral Q6H PRN Oneida Thorpe PA-C      atorvastatin  10 mg Oral Daily Oneida Thorpe PA-C      furosemide  20 mg Oral Daily Leopoldo Boas, MD      losartan  25 mg Oral Daily Leopoldo Boas, MD      metoprolol  2 5 mg Intravenous Q6H PRN Sunday SUMA Sanchez      metoprolol tartrate  100 mg Oral Q12H [address] Leopoldo Boas, MD      ondansetron  4 mg Intravenous Q6H PRN Oneida Thorpe PA-C      rivaroxaban  15 mg Oral QPM Ameliakanika Penn PA-C      spironolactone  12 5 mg Oral Daily Leopoldo Boas, MD       acetaminophen, 650 mg, Q6H PRN  metoprolol, 2 5 mg, Q6H PRN  ondansetron, 4 mg, Q6H PRN      No Known Allergies    VTE Pharmacologic Prophylaxis:   Xarelto    Labs:   Troponins:  Results from last 7 days   Lab Units 08/14/22  0225   HSTNI D2 ng/L 0         CBC with diff:  Results from last 7 days   Lab Units 08/16/22  0436 08/15/22  0448 08/14/22  0606 08/14/22  0027   WBC Thousand/uL 6 56 5 15 6 41 7 62   HEMOGLOBIN g/dL 15 0 13 6 13 7 14 5   HEMATOCRIT % 45 2 42 2 41 8 44 7   MCV fL 95 96 95 96   PLATELETS Thousands/uL 285 281 284 313   MCH pg 31 4 31 0 31 2 31 0   MCHC g/dL 33 2 32 2 32 8 32 4   RDW % 12 9 12 8 12 8 13 0   MPV fL 10 0 9 9 9 5 10 0   NRBC AUTO /100 WBCs 0 0 0 0       CMP:  Results from last 7 days   Lab Units 08/18/22  0514 08/17/22  0439 08/16/22  0436 08/15/22  0448 08/14/22  0606 08/14/22  0027   SODIUM mmol/L 140 140 139 145 142 142   POTASSIUM mmol/L 3 8 3 8 4 3 3 8 4 2 4 0   CHLORIDE mmol/L 102 101 100 105 103 103   CO2 mmol/L 28 29 30 33* 30 27   ANION GAP mmol/L 10 10 9 7 9 12   BUN mg/dL 32* 26* 27* 28* 25 27*   CREATININE mg/dL 1 13 1 20 1 19 1 44* 1 30 1 40*   CALCIUM mg/dL 8 6 8 6 8 6 8 4 9 1 9 4   AST U/L  --   --   --  16  --  21   ALT U/L  --   --   --  13  --  15   ALK PHOS U/L  --   --   --  100  --  123*   TOTAL PROTEIN g/dL  --   --   --  6 5  --  8 1   ALBUMIN g/dL  -- --   --  2 9*  --  3 9   TOTAL BILIRUBIN mg/dL  --   --   --  0 73  --  0 76   EGFR ml/min/1 73sq m 45 42 42 33 38 35     Coags:  Results from last 7 days   Lab Units 08/14/22  0027   PTT seconds 31   INR  2 03*       Imaging & Testing   I have personally reviewed pertinent reports  XR chest 1 view portable    Result Date: 8/14/2022  Narrative: CHEST INDICATION:   swelling  COMPARISON:  Chest radiograph from 11/6/2015  EXAM PERFORMED/VIEWS:  XR CHEST PORTABLE FINDINGS: Cardiomediastinal silhouette appears unremarkable  The lungs are clear  No pneumothorax or pleural effusion  Osseous structures appear within normal limits for patient age  Impression: No acute cardiopulmonary disease  Workstation performed: [id number]     Echo complete w/ contrast if indicated    Result Date: 8/15/2022  Narrative: [facility]  Left Ventricle: Left ventricular cavity size is normal  Wall thickness is normal  Systolic function is severely reduced  Estimated ejection fraction is 25-30%  Wall motion cannot be accurately assessed  Diastolic dysfunction, grade indeterminate due to underlying atrial fibrillation    Right Ventricle: Systolic function is reduced    Left Atrium: The atrium is severely dilated    Right Atrium: The atrium is moderately dilated    Mitral Valve: There is annular calcification  There is mild regurgitation    Tricuspid Valve: There is mild regurgitation  The right ventricular systolic pressure is mildly elevated  EKG / Monitor: Personally reviewed  Monitor shows atrial fibrillation with heart rate now  beats per minute  Dr Carl Carlos MD [address]      "This note has been constructed using a voice recognition system  Therefore there may be syntax, spelling, and/or grammatical errors   Please call if you have any questions  "

## 2022-08-19 VITALS
OXYGEN SATURATION: 95 % | TEMPERATURE: 97.9 F | RESPIRATION RATE: 18 BRPM | BODY MASS INDEX: 22.08 KG/M2 | WEIGHT: 109.5 LBS | HEIGHT: 59 IN | HEART RATE: 88 BPM | DIASTOLIC BLOOD PRESSURE: 90 MMHG | SYSTOLIC BLOOD PRESSURE: 144 MMHG

## 2022-08-19 PROBLEM — I10 HYPERTENSION: Status: ACTIVE | Noted: 2022-08-19

## 2022-08-19 LAB
ANION GAP SERPL CALCULATED.3IONS-SCNC: 9 MMOL/L (ref 4–13)
BUN SERPL-MCNC: 38 MG/DL (ref 5–25)
CALCIUM SERPL-MCNC: 8.7 MG/DL (ref 8.3–10.1)
CHLORIDE SERPL-SCNC: 102 MMOL/L (ref 96–108)
CO2 SERPL-SCNC: 27 MMOL/L (ref 21–32)
CREAT SERPL-MCNC: 1.11 MG/DL (ref 0.6–1.3)
GFR SERPL CREATININE-BSD FRML MDRD: 46 ML/MIN/1.73SQ M
GLUCOSE SERPL-MCNC: 112 MG/DL (ref 65–140)
POTASSIUM SERPL-SCNC: 4.4 MMOL/L (ref 3.5–5.3)
SODIUM SERPL-SCNC: 138 MMOL/L (ref 135–147)

## 2022-08-19 PROCEDURE — 80048 BASIC METABOLIC PNL TOTAL CA: CPT | Performed by: INTERNAL MEDICINE

## 2022-08-19 PROCEDURE — 99239 HOSP IP/OBS DSCHRG MGMT >30: CPT | Performed by: INTERNAL MEDICINE

## 2022-08-19 PROCEDURE — 99232 SBSQ HOSP IP/OBS MODERATE 35: CPT | Performed by: INTERNAL MEDICINE

## 2022-08-19 RX ORDER — LOSARTAN POTASSIUM 25 MG/1
25 TABLET ORAL DAILY
Qty: 30 TABLET | Refills: 0 | Status: SHIPPED | OUTPATIENT
Start: 2022-08-20 | End: 2022-08-31 | Stop reason: SDUPTHER

## 2022-08-19 RX ORDER — FUROSEMIDE 20 MG/1
20 TABLET ORAL EVERY OTHER DAY
Qty: 30 TABLET | Refills: 0 | Status: SHIPPED | OUTPATIENT
Start: 2022-08-20 | End: 2022-08-31 | Stop reason: SDUPTHER

## 2022-08-19 RX ORDER — DIGOXIN 125 MCG
125 TABLET ORAL 2 TIMES WEEKLY
Qty: 30 TABLET | Refills: 0 | Status: SHIPPED | OUTPATIENT
Start: 2022-08-22 | End: 2022-08-31 | Stop reason: SDUPTHER

## 2022-08-19 RX ORDER — METOPROLOL TARTRATE 100 MG/1
100 TABLET ORAL EVERY 12 HOURS SCHEDULED
Qty: 60 TABLET | Refills: 0 | Status: SHIPPED | OUTPATIENT
Start: 2022-08-19 | End: 2022-08-31 | Stop reason: SDUPTHER

## 2022-08-19 RX ORDER — DIGOXIN 125 MCG
125 TABLET ORAL
Status: DISCONTINUED | OUTPATIENT
Start: 2022-08-21 | End: 2022-08-19 | Stop reason: HOSPADM

## 2022-08-19 RX ORDER — SPIRONOLACTONE 25 MG/1
12.5 TABLET ORAL EVERY OTHER DAY
Qty: 8 TABLET | Refills: 0 | Status: SHIPPED | OUTPATIENT
Start: 2022-08-21 | End: 2022-08-31 | Stop reason: SDUPTHER

## 2022-08-19 RX ADMIN — LOSARTAN POTASSIUM 25 MG: 25 TABLET, FILM COATED ORAL at 08:58

## 2022-08-19 RX ADMIN — ATORVASTATIN CALCIUM 10 MG: 10 TABLET, FILM COATED ORAL at 08:57

## 2022-08-19 RX ADMIN — SPIRONOLACTONE 12.5 MG: 25 TABLET ORAL at 08:57

## 2022-08-19 RX ADMIN — METOPROLOL TARTRATE 100 MG: 100 TABLET, FILM COATED ORAL at 08:57

## 2022-08-19 NOTE — ASSESSMENT & PLAN NOTE
Noted to be in a fib RVR on arrival, HR between 110-150s  · Given metoprolol 10mg in ED, cardizem bolus  · Patient has been on Cardizem drip at 15 milligram/hour which was later discontinued as blood pressure was on the soft side heart rate was low    Patient was later started on IV amiodarone loading which was discontinued as patient has chronic atrial fibrillation  · Patient is on anticoagulation with Xarelto  · Cardiology input appreciated  · Patient was loaded with digoxin- patient received 0 25 milligrams tablets 1 followed by 0 125 milligrams x 3  · Patient currently on digoxin 125 microgram every other day  · Patient is on Lopressor which was increased to 100 milligram p o  B i d

## 2022-08-19 NOTE — ASSESSMENT & PLAN NOTE
Noted to be in a fib RVR on arrival, HR between 110-150s  · Given metoprolol 10mg in ED, cardizem bolus  · Patient has been on Cardizem drip at 15 milligram/hour which was later discontinued as blood pressure was on the soft side heart rate was low    Patient was later started on IV amiodarone loading which was discontinued as patient has chronic atrial fibrillation  · Patient is on anticoagulation with Xarelto  · Cardiology input appreciated  · Patient was loaded with digoxin- patient received 0 25 milligrams tablets 1 followed by 0 125 milligrams x 3  · Patient to be discharged on digoxin 125 milligram 2 times a week  · Patient is on Lopressor which was increased to 100 milligram p o  B i d

## 2022-08-19 NOTE — ASSESSMENT & PLAN NOTE
Wt Readings from Last 3 Encounters:   08/19/22 49 7 kg (109 lb 8 oz)   08/22/21 55 8 kg (123 lb)     Patient presents with worsening edema x2 weeks,   · Started on HCTZ 12 5mg by PCP last week without improvement   · BNP 2787  · Troponin trend negative  Echo showed EF of 60-81% with diastolic dysfunction with severely dilated left atrium right atrium  Echo from 2020 showed EF of 30-35%  The patient received IV Lasix with very good diuresis  Patient was on Lasix IV initially followed by Lasix 20 milligram p o  Daily  Patient was also had a Aldactone 12 5 milligram p o  Daily, losartan 25 milligram p o  Daily   Lasix was changed to every other day  BUN continues to get worse  Continue Lasix 20 milligram every other day alternating with Aldactone 12 5 milligram every other day    Follow-up BMP in 1 week

## 2022-08-19 NOTE — DISCHARGE SUMMARY
[address]  66   Discharge- Felipe Benitez 1940, 80 y o  female MRN: [id number]  Unit/Bed#: [address] [address] Encounter: [id number]  Primary Care Provider: Tess Ashraf MD   Date and time admitted to hospital: 8/14/2022 12:04 AM    * Acute combined systolic and diastolic congestive heart failure ([address] )  Assessment & Plan  Wt Readings from Last 3 Encounters:   08/19/22 49 7 kg (109 lb 8 oz)   08/22/21 55 8 kg (123 lb)     Patient presents with worsening edema x2 weeks,   · Started on HCTZ 12 5mg by PCP last week without improvement   · BNP 2787  · Troponin trend negative  Echo showed EF of 71-41% with diastolic dysfunction with severely dilated left atrium right atrium  Echo from 2020 showed EF of 30-35%  The patient received IV Lasix with very good diuresis  Patient was on Lasix IV initially followed by Lasix 20 milligram p o  Daily  Patient was also had a Aldactone 12 5 milligram p o  Daily, losartan 25 milligram p o  Daily   Lasix was changed to every other day  BUN continues to get worse  Continue Lasix 20 milligram every other day alternating with Aldactone 12 5 milligram every other day  Follow-up BMP in 1 week            Atrial fibrillation with RVR ([facility])  Assessment & Plan  Noted to be in a fib RVR on arrival, HR between 110-150s  · Given metoprolol 10mg in ED, cardizem bolus  · Patient has been on Cardizem drip at 15 milligram/hour which was later discontinued as blood pressure was on the soft side heart rate was low    Patient was later started on IV amiodarone loading which was discontinued as patient has chronic atrial fibrillation  · Patient is on anticoagulation with Xarelto  · Cardiology input appreciated  · Patient was loaded with digoxin- patient received 0 25 milligrams tablets 1 followed by 0 125 milligrams x 3  · Patient to be discharged on digoxin 125 milligram 2 times a week  · Patient is on Lopressor which was increased to 100 milligram p o  B i d         JUANITA (acute kidney injury) ([facility] )  Assessment & Plan  Creatine 1 4, baseline 1 0-1 3   Patient has been on IV Lasix which will be transitioned to Lasix 20 milligram p o  Daily from tomorrow   Avoid nephrotoxins and hypotension   Creatinine has improved but BUN is going up  Patient to alternate Lasix 20 milligrams with Aldactone 12 5 milligram   Follow-up BMP in 1 week  Results from last 7 days   Lab Units 08/19/22  0536 08/18/22  0514 08/17/22  0439 08/16/22  0436 08/15/22  0448 08/14/22  0606 08/14/22  0027   BUN mg/dL 38* 32* 26* 27* 28* 25 27*   CREATININE mg/dL 1 11 1 13 1 20 1 19 1 44* 1 30 1 40*         Hyperlipemia  Assessment & Plan  Continue atorvastatin       Medical Problems             Resolved Problems  Date Reviewed: 8/19/2022   None               Discharging Physician / Practitioner: Alonzo Cruz MD  PCP: Viky Siddiqui MD  Admission Date:   Admission Orders (From admission, onward)     Ordered        08/14/22 0118  INPATIENT ADMISSION  Once                      Discharge Date: 08/19/22    Consultations During Hospital Stay:  · Cardiology    Procedures Performed:   · 2D echo showed EF of 29-02% with diastolic dysfunction with dilated left atrium and moderately dilated right atrium       Outpatient Tests Requested:  · BMP in 1 week with outpatient follow-up with Cardiology    Complications:  None    Reason for Admission:  Leg swelling    Hospital Course:   Edwina Jackson is a 80 y o  female patient with past medical history of atrial fibrillation, hypertension who originally presented to the hospital on 8/14/2022 due to leg swelling  Patient was seen by PCP and was started on hydrochlorothiazide for leg swelling but leg swelling was getting worse and patient present to the ED  In the ED patient noted to be in new onset CHF and also noted to be in atrial fibrillation with rapid ventricular rate  Patient was initially started on Cardizem drip and IV Lasix    Eventually patient had very good diuresis with IV Lasix and was transitioned to p o  Lasix  Patient was started on Cardizem drip and metoprolol dose was increased  Heart rate continued remained uncontrolled patient was loaded with digoxin  Heart rate was better controlled and patient continued remained stable  Patient be discharged home with outpatient follow-up with Cardiology     Please see above list of diagnoses and related plan for additional information  Condition at Discharge: stable    Discharge Day Visit / Exam:   Subjective:  Patient is feeling well  Denies any leg swelling, chest pain, shortness of breath or palpitations  Vitals: Blood Pressure: 144/90 (08/19/22 0743)  Pulse: 88 (08/19/22 0743)  Temperature: 97 9 °F (36 6 °C) (08/19/22 0743)  Temp Source: Axillary (08/19/22 0743)  Respirations: 18 (08/19/22 0743)  Height: 4' 11" (149 9 cm) (08/15/22 0844)  Weight - Scale: 49 7 kg (109 lb 8 oz) (08/19/22 0600)  SpO2: 95 % (08/19/22 0743)  Exam:   Physical Exam  Constitutional:       Appearance: Normal appearance  HENT:      Head: Normocephalic and atraumatic  Nose: Nose normal       Mouth/Throat:      Mouth: Mucous membranes are moist       Pharynx: Oropharynx is clear  Eyes:      Extraocular Movements: Extraocular movements intact  Pupils: Pupils are equal, round, and reactive to light  Cardiovascular:      Rate and Rhythm: Normal rate  Rhythm irregular  Pulmonary:      Effort: Pulmonary effort is normal       Breath sounds: Normal breath sounds  Abdominal:      General: Bowel sounds are normal  There is no distension  Palpations: Abdomen is soft  Tenderness: There is no abdominal tenderness  Musculoskeletal:         General: No swelling  Cervical back: Normal range of motion and neck supple  Skin:     General: Skin is warm and dry  Neurological:      General: No focal deficit present  Mental Status: She is alert           Discharge instructions/Information to patient and family: See after visit summary for information provided to patient and family  Provisions for Follow-Up Care:  See after visit summary for information related to follow-up care and any pertinent home health orders  Disposition:   Home    Planned Readmission: No     Discharge Statement:  I spent 35 minutes discharging the patient  This time was spent on the day of discharge  I had direct contact with the patient on the day of discharge  Greater than 50% of the total time was spent examining patient, answering all patient questions, arranging and discussing plan of care with patient as well as directly providing post-discharge instructions  Additional time then spent on discharge activities  Discharge Medications:  See after visit summary for reconciled discharge medications provided to patient and/or family        **Please Note: This note may have been constructed using a voice recognition system**

## 2022-08-19 NOTE — PLAN OF CARE
Problem: PAIN - ADULT  Goal: Verbalizes/displays adequate comfort level or baseline comfort level  Description: Interventions:  - Encourage patient to monitor pain and request assistance  - Assess pain using appropriate pain scale  - Administer analgesics based on type and severity of pain and evaluate response  - Implement non-pharmacological measures as appropriate and evaluate response  - Consider cultural and social influences on pain and pain management  - Notify physician/advanced practitioner if interventions unsuccessful or patient reports new pain  Outcome: Progressing     Problem: SAFETY ADULT  Goal: Patient will remain free of falls  Description: INTERVENTIONS:  - Educate patient/family on patient safety including physical limitations  - Instruct patient to call for assistance with activity   - Consult OT/PT to assist with strengthening/mobility   - Keep Call bell within reach  - Keep bed low and locked with side rails adjusted as appropriate  - Keep care items and personal belongings within reach  - Initiate and maintain comfort rounds  - Make Fall Risk Sign visible to staff  - Offer Toileting every 2 Hours, in advance of need  - Initiate/Maintain bed alarm  - Apply yellow socks and bracelet for high fall risk patients  - Consider moving patient to room near nurses station  Outcome: Progressing  Goal: Maintain or return to baseline ADL function  Description: INTERVENTIONS:  -  Assess patient's ability to carry out ADLs; assess patient's baseline for ADL function and identify physical deficits which impact ability to perform ADLs (bathing, care of mouth/teeth, toileting, grooming, dressing, etc )  - Assess/evaluate cause of self-care deficits   - Assess range of motion  - Assess patient's mobility; develop plan if impaired  - Assess patient's need for assistive devices and provide as appropriate  - Encourage maximum independence but intervene and supervise when necessary  - Involve family in performance of ADLs  - Assess for home care needs following discharge   - Consider OT consult to assist with ADL evaluation and planning for discharge  - Provide patient education as appropriate  Outcome: Progressing  Goal: Maintains/Returns to pre admission functional level  Description: INTERVENTIONS:  - Perform BMAT or MOVE assessment daily    - Set and communicate daily mobility goal to care team and patient/family/caregiver     - Collaborate with rehabilitation services on mobility goals if consulte  - Ambulate patient 3 times a day  - Out of bed to chair 3 times a day   - Out of bed for toileting  - Record patient progress and toleration of activity level   Outcome: Progressing

## 2022-08-19 NOTE — PROGRESS NOTES
[address] 45  Progress Note - Alize Johnston 1940, 80 y o  female MRN: [id number]  Unit/Bed#: [address] [facility] Encounter: [id number]  Primary Care Provider: Ama Mckeon MD   Date and time admitted to hospital: 8/14/2022 12:04 AM    * Acute combined systolic and diastolic congestive heart failure ([facility] )  Assessment & Plan  Wt Readings from Last 3 Encounters:   08/18/22 50 2 kg (110 lb 9 6 oz)   08/22/21 55 8 kg (123 lb)     Patient presents with worsening edema x2 weeks,   · Started on HCTZ 12 5mg by PCP last week without improvement   · BNP 2787  · Troponin trend negative  Echo showed EF of 71-67% with diastolic dysfunction with severely dilated left atrium right atrium  Echo from 2020 showed EF of 30-35%  The patient received IV Lasix with very good diuresis  Patient was on Lasix IV initially followed by Lasix 20 milligram p o  Daily  Continu  Aldactone 12 5 milligram p o  Daily, losartan 25 milligram p o  Daily   Lasix was changed to 20 milligram every other due to elevated creatinine            Atrial fibrillation with RVR ([facility])  Assessment & Plan  Noted to be in a fib RVR on arrival, HR between 110-150s  · Given metoprolol 10mg in ED, cardizem bolus  · Patient has been on Cardizem drip at 15 milligram/hour which was later discontinued as blood pressure was on the soft side heart rate was low  Patient was later started on IV amiodarone loading which was discontinued as patient has chronic atrial fibrillation  · Patient is on anticoagulation with Xarelto  · Cardiology input appreciated  · Patient was loaded with digoxin- patient received 0 25 milligrams tablets 1 followed by 0 125 milligrams x 3  · Patient currently on digoxin 125 microgram every other day  · Patient is on Lopressor which was increased to 100 milligram p o  B i d          JUANITA (acute kidney injury) ([facility] )  Assessment & Plan  Creatine 1 4, baseline 1 0-1 3   Patient has been on IV Lasix which will be transitioned to Lasix 20 milligram p o  Daily from tomorrow   Avoid nephrotoxins and hypotension   Creatinine has improved but BUN is going up  Lasix decreased to every other day  Results from last 7 days   Lab Units 22  0514 22  0439 22  0436 08/15/22  0448 22  0606 22  0027   BUN mg/dL 32* 26* 27* 28* 25 27*   CREATININE mg/dL 1 13 1 20 1 19 1 44* 1 30 1 40*         Hyperlipemia  Assessment & Plan  Continue atorvastatin           VTE Pharmacologic Prophylaxis: VTE Score: 4 Moderate Risk (Score 3-4) - Pharmacological DVT Prophylaxis Ordered: rivaroxaban (Xarelto)  Patient Centered Rounds: I performed bedside rounds with nursing staff today  Discussions with Specialists or Other Care Team Provider: Dr Jameson Casarez    Education and Discussions with Family / Patient: Patient declined call to   Time Spent for Care: 45 minutes  More than 50% of total time spent on counseling and coordination of care as described above  Current Length of Stay: 4 day(s)  Current Patient Status: Inpatient   Certification Statement: The patient will continue to require additional inpatient hospital stay due to Atrial fibrillation and CHF  Discharge Plan: Anticipate discharge tomorrow to home  Code Status: Level 3 - DNAR and DNI    Subjective:   Patient is feeling well  Denies any leg swelling  Denies any shortness of breath    Objective:     Vitals:   Temp (24hrs), Av 5 °F (36 4 °C), Min:97 3 °F (36 3 °C), Max:97 7 °F (36 5 °C)    Temp:  [97 3 °F (36 3 °C)-97 7 °F (36 5 °C)] 97 7 °F (36 5 °C)  HR:  [] 76  Resp:  [16-20] 18  BP: (110-138)/(68-88) 118/78  SpO2:  [94 %-97 %] 95 %  Body mass index is 22 34 kg/m²  Input and Output Summary (last 24 hours):      Intake/Output Summary (Last 24 hours) at 2022  Last data filed at 2022 1215  Gross per 24 hour   Intake --   Output 500 ml   Net -500 ml       Physical Exam:   Physical Exam  Constitutional: Appearance: Normal appearance  HENT:      Head: Normocephalic and atraumatic  Nose: Nose normal       Mouth/Throat:      Mouth: Mucous membranes are moist       Pharynx: Oropharynx is clear  Eyes:      Extraocular Movements: Extraocular movements intact  Pupils: Pupils are equal, round, and reactive to light  Cardiovascular:      Rate and Rhythm: Normal rate  Rhythm irregular  Pulmonary:      Effort: Pulmonary effort is normal       Breath sounds: No rales  Abdominal:      General: Bowel sounds are normal  There is no distension  Palpations: Abdomen is soft  Tenderness: There is no abdominal tenderness  Musculoskeletal:         General: No swelling  Cervical back: Normal range of motion and neck supple  Skin:     General: Skin is warm and dry  Neurological:      General: No focal deficit present  Mental Status: She is alert  Additional Data:     Labs:  Results from last 7 days   Lab Units 08/16/22  0436   WBC Thousand/uL 6 56   HEMOGLOBIN g/dL 15 0   HEMATOCRIT % 45 2   PLATELETS Thousands/uL 285   NEUTROS PCT % 67   LYMPHS PCT % 24   MONOS PCT % 6   EOS PCT % 2     Results from last 7 days   Lab Units 08/18/22  0514 08/16/22  0436 08/15/22  0448   SODIUM mmol/L 140   < > 145   POTASSIUM mmol/L 3 8   < > 3 8   CHLORIDE mmol/L 102   < > 105   CO2 mmol/L 28   < > 33*   BUN mg/dL 32*   < > 28*   CREATININE mg/dL 1 13   < > 1 44*   ANION GAP mmol/L 10   < > 7   CALCIUM mg/dL 8 6   < > 8 4   ALBUMIN g/dL  --   --  2 9*   TOTAL BILIRUBIN mg/dL  --   --  0 73   ALK PHOS U/L  --   --  100   ALT U/L  --   --  13   AST U/L  --   --  16   GLUCOSE RANDOM mg/dL 112   < > 114    < > = values in this interval not displayed       Results from last 7 days   Lab Units 08/14/22  0027   INR  2 03*                   Lines/Drains:  Invasive Devices  Report    Peripheral Intravenous Line  Duration           Peripheral IV 08/15/22 Distal;Right;Dorsal (posterior) Forearm 3 days Telemetry:  Telemetry Orders (From admission, onward)             48 Hour Telemetry Monitoring  Continuous x 48 hours           References:    Telemetry Guidelines   Question:  Reason for 48 Hour Telemetry  Answer:  Arrhythmias Requiring Medical Therapy (eg  SVT, Vtach/fib, Bradycardia, Uncontrolled A-fib)                 Telemetry Reviewed: Atrial fibrillation  HR averaging   Indication for Continued Telemetry Use: Arrthymias requiring medical therapy             Imaging: Reviewed radiology reports from this admission including: chest xray    Recent Cultures (last 7 days):         Last 24 Hours Medication List:   Current Facility-Administered Medications   Medication Dose Route Frequency Provider Last Rate    acetaminophen  650 mg Oral Q6H PRN Keya Peters PA-C      atorvastatin  10 mg Oral Daily Keya Peters PA-C      digoxin  125 mcg Oral Every Other Day Abdifatah Richards MD      [START ON 2022] furosemide  20 mg Oral Every Other Day Abdifatah Richards MD      losartan  25 mg Oral Daily Abdifatah Richards MD      metoprolol  2 5 mg Intravenous Q6H PRN SUMA Porras      metoprolol tartrate  100 mg Oral Q12H Julian Cowart MD      ondansetron  4 mg Intravenous Q6H PRN Keya Peters PA-C      rivaroxaban  15 mg Oral QPM Ameliakanika Penn PA-C      [START ON 2022] spironolactone  12 5 mg Oral Every Other Day Abdifatah Richards MD          Today, Patient Was Seen By: Kiera Rooney MD    **Please Note: This note may have been constructed using a voice recognition system  **

## 2022-08-19 NOTE — PLAN OF CARE
Problem: PAIN - ADULT  Goal: Verbalizes/displays adequate comfort level or baseline comfort level  Description: Interventions:  - Encourage patient to monitor pain and request assistance  - Assess pain using appropriate pain scale  - Administer analgesics based on type and severity of pain and evaluate response  - Implement non-pharmacological measures as appropriate and evaluate response  - Consider cultural and social influences on pain and pain management  - Notify physician/advanced practitioner if interventions unsuccessful or patient reports new pain  Outcome: Progressing     Problem: INFECTION - ADULT  Goal: Absence or prevention of progression during hospitalization  Description: INTERVENTIONS:  - Assess and monitor for signs and symptoms of infection  - Monitor lab/diagnostic results  - Monitor all insertion sites, i e  indwelling lines, tubes, and drains  - Monitor endotracheal if appropriate and nasal secretions for changes in amount and color  - [place] appropriate cooling/warming therapies per order  - Administer medications as ordered  - Instruct and encourage patient and family to use good hand hygiene technique  - Identify and instruct in appropriate isolation precautions for identified infection/condition  Outcome: Progressing  Goal: Absence of fever/infection during neutropenic period  Description: INTERVENTIONS:  - Monitor WBC    Outcome: Progressing     Problem: SAFETY ADULT  Goal: Patient will remain free of falls  Description: INTERVENTIONS:  - Educate patient/family on patient safety including physical limitations  - Instruct patient to call for assistance with activity   - Consult OT/PT to assist with strengthening/mobility   - Keep Call bell within reach  - Keep bed low and locked with side rails adjusted as appropriate  - Keep care items and personal belongings within reach  - Initiate and maintain comfort rounds  - Make Fall Risk Sign visible to staff  - Offer Toileting every 2 Hours, in advance of need  - Initiate/Maintain bed/chair alarm  - Obtain necessary fall risk management equipment: walker    - Apply yellow socks and bracelet for high fall risk patients  - Consider moving patient to room near nurses station  Outcome: Progressing  Goal: Maintain or return to baseline ADL function  Description: INTERVENTIONS:  -  Assess patient's ability to carry out ADLs; assess patient's baseline for ADL function and identify physical deficits which impact ability to perform ADLs (bathing, care of mouth/teeth, toileting, grooming, dressing, etc )  - Assess/evaluate cause of self-care deficits   - Assess range of motion  - Assess patient's mobility; develop plan if impaired  - Assess patient's need for assistive devices and provide as appropriate  - Encourage maximum independence but intervene and supervise when necessary  - Involve family in performance of ADLs  - Assess for home care needs following discharge   - Consider OT consult to assist with ADL evaluation and planning for discharge  - Provide patient education as appropriate  Outcome: Progressing  Goal: Maintains/Returns to pre admission functional level  Description: INTERVENTIONS:  - Perform BMAT or MOVE assessment daily    - Set and communicate daily mobility goal to care team and patient/family/caregiver  - Collaborate with rehabilitation services on mobility goals if consulted  - Perform Range of Motion 2 times a day  - Reposition patient every 2 hours    - Dangle patient 3  times a day  - Stand patient 3  times a day  - Ambulate patient 2 times a day  - Out of bed to chair tid times a day   - Out of bed for meals 3 times a day  - Out of bed for toileting  - Record patient progress and toleration of activity level   Outcome: Progressing     Problem: Knowledge Deficit  Goal: Patient/family/caregiver demonstrates understanding of disease process, treatment plan, medications, and discharge instructions  Description: Complete learning assessment and assess knowledge base    Interventions:  - Provide teaching at level of understanding  - Provide teaching via preferred learning methods  Outcome: Progressing     Problem: Potential for Falls  Goal: Patient will remain free of falls  Description: INTERVENTIONS:  - Educate patient/family on patient safety including physical limitations  - Instruct patient to call for assistance with activity   - Consult OT/PT to assist with strengthening/mobility   - Keep Call bell within reach  - Keep bed low and locked with side rails adjusted as appropriate  - Keep care items and personal belongings within reach  - Initiate and maintain comfort rounds  - Make Fall Risk Sign visible to staff  - Offer Toileting every 2 Hours, in advance of need  - Initiate/Maintain bed alarm  - Obtain necessary fall risk management equipment: walker  - Apply yellow socks and bracelet for high fall risk patients  - Consider moving patient to room near nurses station  Outcome: Progressing     Problem: CARDIOVASCULAR - ADULT  Goal: Maintains optimal cardiac output and hemodynamic stability  Description: INTERVENTIONS:  - Monitor I/O, vital signs and rhythm  - Monitor for S/S and trends of decreased cardiac output  - Administer and titrate ordered vasoactive medications to optimize hemodynamic stability  - Assess quality of pulses, skin color and temperature  - Assess for signs of decreased coronary artery perfusion  - Instruct patient to report change in severity of symptoms  Outcome: Progressing  Goal: Absence of cardiac dysrhythmias or at baseline rhythm  Description: INTERVENTIONS:  - Continuous cardiac monitoring, vital signs, obtain 12 lead EKG if ordered  - Administer antiarrhythmic and heart rate control medications as ordered  - Monitor electrolytes and administer replacement therapy as ordered  Outcome: Progressing     Problem: MOBILITY - ADULT  Goal: Maintain or return to baseline ADL function  Description: INTERVENTIONS:  - Assess patient's ability to carry out ADLs; assess patient's baseline for ADL function and identify physical deficits which impact ability to perform ADLs (bathing, care of mouth/teeth, toileting, grooming, dressing, etc )  - Assess/evaluate cause of self-care deficits   - Assess range of motion  - Assess patient's mobility; develop plan if impaired  - Assess patient's need for assistive devices and provide as appropriate  - Encourage maximum independence but intervene and supervise when necessary  - Involve family in performance of ADLs  - Assess for home care needs following discharge   - Consider OT consult to assist with ADL evaluation and planning for discharge  - Provide patient education as appropriate  Outcome: Progressing  Goal: Maintains/Returns to pre admission functional level  Description: INTERVENTIONS:  - Perform BMAT or MOVE assessment daily    - Set and communicate daily mobility goal to care team and patient/family/caregiver  - Collaborate with rehabilitation services on mobility goals if consulted  - Perform Range of Motion 3 times a day  - Reposition patient every 2 hours    - Dangle patient 3 times a day  - Stand patient 3 times a day  - Ambulate patient bid times a day  - Out of bed to chair 3 times a day   - Out of bed for meals 3 times a day  - Out of bed for toileting  - Record patient progress and toleration of activity level   Outcome: Progressing

## 2022-08-19 NOTE — ASSESSMENT & PLAN NOTE
Creatine 1 4, baseline 1 0-1 3   Patient has been on IV Lasix which will be transitioned to Lasix 20 milligram p o  Daily from tomorrow   Avoid nephrotoxins and hypotension   Creatinine has improved but BUN is going up  Lasix decreased to every other day    Results from last 7 days   Lab Units 08/18/22  0514 08/17/22  0439 08/16/22  0436 08/15/22  0448 08/14/22  0606 08/14/22  0027   BUN mg/dL 32* 26* 27* 28* 25 27*   CREATININE mg/dL 1 13 1 20 1 19 1 44* 1 30 1 40*

## 2022-08-19 NOTE — ASSESSMENT & PLAN NOTE
Creatine 1 4, baseline 1 0-1 3   Patient has been on IV Lasix which will be transitioned to Lasix 20 milligram p o  Daily from tomorrow   Avoid nephrotoxins and hypotension   Creatinine has improved but BUN is going up    Patient to alternate Lasix 20 milligrams with Aldactone 12 5 milligram   Follow-up BMP in 1 week  Results from last 7 days   Lab Units 08/19/22  0536 08/18/22  0514 08/17/22  0439 08/16/22  0436 08/15/22  0448 08/14/22  0606 08/14/22  0027   BUN mg/dL 38* 32* 26* 27* 28* 25 27*   CREATININE mg/dL 1 11 1 13 1 20 1 19 1 44* 1 30 1 40*

## 2022-08-19 NOTE — PROGRESS NOTES
Progress Note - Cardiology   [facility]     Андрей Felix 80 y o  female MRN: [id number]  : 1940  Unit/Bed#: [address]- Encounter: [id number]    Assessment and Plan:   1  Acute on chronic combined systolic and diastolic heart failure:  Echo demonstrates EF of around 30%    -  continue Lopressor to 100 mg b i d     -  continue Lasix 20 mg every other day alternating with Aldactone 12 5 mg every other day    -  continue Cozaar 25 mg daily    -  close monitoring of I&O, daily weights electrolytes    -  low-sodium diet    -  CHF education     2  Chronic atrial fibrillation:  Patient's heart rates still not well controlled, but slowly improving    -  continue Lopressor to 100 mg b i d      -  patient received digoxin load of 0 25 mg x 1 and 0 125 mg x3 (last dose at 10:00 a m  2022    -  will continue digoxin 0 125 mg twice a week on  and     -  continue Xarelto 50 mg daily     3  Dyslipidemia:  Continue Lipitor mg daily     4  Acute kidney injury on chronic kidney disease:  Baseline creatinine 1-1 3    -  creatinine today is 1 2    -  most likely due to rapid atrial fibrillation    Subjective / Objective:   Patient seen and examined  She is in good spirits  No complaints offered at this time  Heart rates improved with addition of digoxin and increased dose of Lopressor  Continue Xarelto as she was taking pre-hospital     Vitals: Blood pressure 144/90, pulse 88, temperature (!) 97 4 °F (36 3 °C), resp  rate 18, height 4' 11" (1 499 m), weight 49 7 kg (109 lb 8 oz), SpO2 95 %  Vitals:    22 1215 22 0600   Weight: 50 2 kg (110 lb 9 6 oz) 49 7 kg (109 lb 8 oz)     Body mass index is 22 12 kg/m²    BP Readings from Last 3 Encounters:   22 144/90   21 142/100     Orthostatic Blood Pressures    Flowsheet Row Most Recent Value   Blood Pressure 144/90 filed at 2022 8843   Patient Position - Orthostatic VS Lying filed at 2022 0200 I/O       08/17 0701  08/18 0700 08/18 0701  08/19 0700 08/19 0701  08/20 0700    P  O  Total Intake(mL/kg)       Urine (mL/kg/hr)  750 (0 6)     Stool       Total Output  750     Net  -750                Invasive Devices  Report    Peripheral Intravenous Line  Duration           Peripheral IV 08/15/22 Distal;Right;Dorsal (posterior) Forearm 3 days                  Intake/Output Summary (Last 24 hours) at 8/19/2022 0931  Last data filed at 8/18/2022 2100  Gross per 24 hour   Intake --   Output 750 ml   Net -750 ml         Physical Exam:   Physical Exam  Vitals and nursing note reviewed  Constitutional:       General: She is not in acute distress  Appearance: Normal appearance  She is normal weight  HENT:      Right Ear: External ear normal       Left Ear: External ear normal       Nose: Nose normal    Eyes:      General: No scleral icterus  Right eye: No discharge  Left eye: No discharge  Cardiovascular:      Rate and Rhythm: Normal rate  Rhythm irregular  Pulses: Normal pulses  Heart sounds: Normal heart sounds  Pulmonary:      Effort: Pulmonary effort is normal  No accessory muscle usage or respiratory distress  Breath sounds: Examination of the right-lower field reveals decreased breath sounds  Examination of the left-lower field reveals decreased breath sounds  Decreased breath sounds present  Abdominal:      General: Bowel sounds are normal  There is no distension  Palpations: Abdomen is soft  Musculoskeletal:      Right lower leg: No edema  Left lower leg: No edema  Skin:     General: Skin is warm and dry  Capillary Refill: Capillary refill takes less than 2 seconds  Neurological:      General: No focal deficit present  Mental Status: She is alert and oriented to person, place, and time  Mental status is at baseline     Psychiatric:         Mood and Affect: Mood normal                  Medications/ Allergies:     Current Facility-Administered Medications   Medication Dose Route Frequency Provider Last Rate    acetaminophen  650 mg Oral Q6H PRN Sacha Branch PA-C      atorvastatin  10 mg Oral Daily Sacha Branch PA-C      digoxin  125 mcg Oral Every Other Day Mirella Gonzalez MD      [START ON 8/20/2022] furosemide  20 mg Oral Every Other Day Mirella Gonazlez MD      losartan  25 mg Oral Daily Mirella Gonzalez MD      metoprolol  2 5 mg Intravenous Q6H PRN SUMA Slade      metoprolol tartrate  100 mg Oral Q12H [address] Mirella Gonzalez MD      ondansetron  4 mg Intravenous Q6H PRN Sacha Branch PA-C      rivaroxaban  15 mg Oral QPM Ameliakanika Penn PA-C      spironolactone  12 5 mg Oral Every Other Day Mirella Gonzalez MD       acetaminophen, 650 mg, Q6H PRN  metoprolol, 2 5 mg, Q6H PRN  ondansetron, 4 mg, Q6H PRN      No Known Allergies    VTE Pharmacologic Prophylaxis:   Sequential compression device (Venodyne)     Labs:   Troponins:  Results from last 7 days   Lab Units 08/14/22  0225   HSTNI D2 ng/L 0     CBC with diff:  Results from last 7 days   Lab Units 08/16/22  0436 08/15/22  0448 08/14/22  0606 08/14/22  0027   WBC Thousand/uL 6 56 5 15 6 41 7 62   HEMOGLOBIN g/dL 15 0 13 6 13 7 14 5   HEMATOCRIT % 45 2 42 2 41 8 44 7   MCV fL 95 96 95 96   PLATELETS Thousands/uL 285 281 284 313   MCH pg 31 4 31 0 31 2 31 0   MCHC g/dL 33 2 32 2 32 8 32 4   RDW % 12 9 12 8 12 8 13 0   MPV fL 10 0 9 9 9 5 10 0   NRBC AUTO /100 WBCs 0 0 0 0     CMP:  Results from last 7 days   Lab Units 08/19/22  0536 08/18/22  0514 08/17/22  0439 08/16/22  0436 08/15/22  0448 08/14/22  0606 08/14/22  0027   SODIUM mmol/L 138 140 140 139 145 142 142   POTASSIUM mmol/L 4 4 3 8 3 8 4 3 3 8 4 2 4 0   CHLORIDE mmol/L 102 102 101 100 105 103 103   CO2 mmol/L 27 28 29 30 33* 30 27   ANION GAP mmol/L 9 10 10 9 7 9 12   BUN mg/dL 38* 32* 26* 27* 28* 25 27*   CREATININE mg/dL 1 11 1 13 1 20 1 19 1 44* 1 30 1 40*   CALCIUM mg/dL 8 7 8 6 8 6 8 6 8 4 9 1 9 4   AST U/L  --   --   --   --  16  --  21   ALT U/L  --   --   --   --  13  --  15   ALK PHOS U/L  --   --   --   --  100  --  123*   TOTAL PROTEIN g/dL  --   --   --   --  6 5  --  8 1   ALBUMIN g/dL  --   --   --   --  2 9*  --  3 9   TOTAL BILIRUBIN mg/dL  --   --   --   --  0 73  --  0 76   EGFR ml/min/1 73sq m 46 45 42 42 33 38 35     Coags:  Results from last 7 days   Lab Units 08/14/22  0027   PTT seconds 31   INR  2 03*       Imaging & Testing   I have personally reviewed pertinent reports  XR chest 1 view portable    Result Date: 8/14/2022  Narrative: CHEST INDICATION:   swelling  COMPARISON:  Chest radiograph from 11/6/2015  EXAM PERFORMED/VIEWS:  XR CHEST PORTABLE FINDINGS: Cardiomediastinal silhouette appears unremarkable  The lungs are clear  No pneumothorax or pleural effusion  Osseous structures appear within normal limits for patient age  Impression: No acute cardiopulmonary disease  Workstation performed: [id number]     Echo complete w/ contrast if indicated    Result Date: 8/15/2022  Narrative: Michele Cardoza  Left Ventricle: Left ventricular cavity size is normal  Wall thickness is normal  Systolic function is severely reduced  Estimated ejection fraction is 25-30%  Wall motion cannot be accurately assessed  Diastolic dysfunction, grade indeterminate due to underlying atrial fibrillation    Right Ventricle: Systolic function is reduced    Left Atrium: The atrium is severely dilated    Right Atrium: The atrium is moderately dilated    Mitral Valve: There is annular calcification  There is mild regurgitation    Tricuspid Valve: There is mild regurgitation  The right ventricular systolic pressure is mildly elevated  EKG / Monitor: Personally reviewed  Atrial fibrillation with improved heart rate control with addition of digoxin        Clau Trivedi [address] Dominique Khanna  Cardiology      "This note has been constructed using a voice recognition system  Therefore there may be syntax, spelling, and/or grammatical errors   Please call if you have any questions  "

## 2022-08-20 NOTE — PLAN OF CARE
Problem: PAIN - ADULT  Goal: Verbalizes/displays adequate comfort level or baseline comfort level  Description: Interventions:  - Encourage patient to monitor pain and request assistance  - Assess pain using appropriate pain scale  - Administer analgesics based on type and severity of pain and evaluate response  - Implement non-pharmacological measures as appropriate and evaluate response  - Consider cultural and social influences on pain and pain management  - Notify physician/advanced practitioner if interventions unsuccessful or patient reports new pain  8/19/2022 2028 by Alesia Schilling RN  Outcome: Completed  8/19/2022 1358 by Alesia Schilling RN  Outcome: Progressing     Problem: INFECTION - ADULT  Goal: Absence or prevention of progression during hospitalization  Description: INTERVENTIONS:  - Assess and monitor for signs and symptoms of infection  - Monitor lab/diagnostic results  - Monitor all insertion sites, i e  indwelling lines, tubes, and drains  - Monitor endotracheal if appropriate and nasal secretions for changes in amount and color  - [place] appropriate cooling/warming therapies per order  - Administer medications as ordered  - Instruct and encourage patient and family to use good hand hygiene technique  - Identify and instruct in appropriate isolation precautions for identified infection/condition  8/19/2022 2028 by Alesia Schilling RN  Outcome: Completed  8/19/2022 1358 by Alesia Schilling RN  Outcome: Progressing  Goal: Absence of fever/infection during neutropenic period  Description: INTERVENTIONS:  - Monitor WBC    8/19/2022 2028 by Alesia Schilling RN  Outcome: Completed  8/19/2022 1358 by Alesia Schilling RN  Outcome: Progressing     Problem: SAFETY ADULT  Goal: Patient will remain free of falls  Description: INTERVENTIONS:  - Educate patient/family on patient safety including physical limitations  - Instruct patient to call for assistance with activity   - Consult OT/PT to assist with strengthening/mobility   - Keep Call bell within reach  - Keep bed low and locked with side rails adjusted as appropriate  - Keep care items and personal belongings within reach  - Initiate and maintain comfort rounds  - Make Fall Risk Sign visible to staff  - Offer Toileting every 2 Hours, in advance of need  - Initiate/Maintain bed alarm  - Obtain necessary fall risk management equipment: walker  - Apply yellow socks and bracelet for high fall risk patients  - Consider moving patient to room near nurses station  8/19/2022 2028 by Alesia Costa RN  Outcome: Completed  8/19/2022 1358 by Alesia Costa RN  Outcome: Progressing  Goal: Maintain or return to baseline ADL function  Description: INTERVENTIONS:  -  Assess patient's ability to carry out ADLs; assess patient's baseline for ADL function and identify physical deficits which impact ability to perform ADLs (bathing, care of mouth/teeth, toileting, grooming, dressing, etc )  - Assess/evaluate cause of self-care deficits   - Assess range of motion  - Assess patient's mobility; develop plan if impaired  - Assess patient's need for assistive devices and provide as appropriate  - Encourage maximum independence but intervene and supervise when necessary  - Involve family in performance of ADLs  - Assess for home care needs following discharge   - Consider OT consult to assist with ADL evaluation and planning for discharge  - Provide patient education as appropriate  8/19/2022 2028 by Alesia Costa RN  Outcome: Completed  8/19/2022 1358 by Alesia Costa RN  Outcome: Progressing  Goal: Maintains/Returns to pre admission functional level  Description: INTERVENTIONS:  - Perform BMAT or MOVE assessment daily    - Set and communicate daily mobility goal to care team and patient/family/caregiver  - Collaborate with rehabilitation services on mobility goals if consulted  - Perform Range of Motion 3 times a day  - Reposition patient every 3 hours    - Dangle patient 3  times a day  - Stand patient 3 times a day  - Ambulate patient  2 times a day  - Out of bed to chair 2 times a day   - Out of bed for meals 3  Problem: INFECTION - ADULT  Goal: Absence or prevention of progression during hospitalization  Description: INTERVENTIONS:  - Assess and monitor for signs and symptoms of infection  - Monitor lab/diagnostic results  - Monitor all insertion sites, i e  indwelling lines, tubes, and drains  - Monitor endotracheal if appropriate and nasal secretions for changes in amount and color  - [place] appropriate cooling/warming therapies per order  - Administer medications as ordered  - Instruct and encourage patient and family to use good hand hygiene technique  - Identify and instruct in appropriate isolation precautions for identified infection/condition  8/19/2022 2028 by Alesia Lorenzo RN  Outcome: Completed  8/19/2022 1358 by Alesia Lorenzo RN  Outcome: Progressing  Goal: Absence of fever/infection during neutropenic period  Description: INTERVENTIONS:  - Monitor WBC    8/19/2022 2028 by Alesia Lorenzo RN  Outcome: Completed  8/19/2022 1358 by Alesia Lorenzo RN  Outcome: Progressing     Problem: SAFETY ADULT  Goal: Patient will remain free of falls  Description: INTERVENTIONS:  - Educate patient/family on patient safety including physical limitations  - Instruct patient to call for assistance with activity   - Consult OT/PT to assist with strengthening/mobility   - Keep Call bell within reach  - Keep bed low and locked with side rails adjusted as appropriate  - Keep care items and personal belongings within reach  - Initiate and maintain comfort rounds  - Make Fall Risk Sign visible to staff  - Offer Toileting every 2 Hours, in advance of need  - Initiate/Maintain bed alarm  - Obtain necessary fall risk management equipment: walker  - Apply yellow socks and bracelet for high fall risk patients  - Consider moving patient to room near nurses station  8/19/2022 2028 by Alesia Dao RN  Outcome: Completed  8/19/2022 1358 by Alesia Dao RN  Outcome: Progressing  Goal: Maintain or return to baseline ADL function  Description: INTERVENTIONS:  -  Assess patient's ability to carry out ADLs; assess patient's baseline for ADL function and identify physical deficits which impact ability to perform ADLs (bathing, care of mouth/teeth, toileting, grooming, dressing, etc )  - Assess/evaluate cause of self-care deficits   - Assess range of motion  - Assess patient's mobility; develop plan if impaired  - Assess patient's need for assistive devices and provide as appropriate  - Encourage maximum independence but intervene and supervise when necessary  - Involve family in performance of ADLs  - Assess for home care needs following discharge   - Consider OT consult to assist with ADL evaluation and planning for discharge  - Provide patient education as appropriate  8/19/2022 2028 by Alesia Dao RN  Outcome: Completed  8/19/2022 1358 by Alesia Dao RN  Outcome: Progressing  Goal: Maintains/Returns to pre admission functional level  Description: INTERVENTIONS:  - Perform BMAT or MOVE assessment daily    - Set and communicate daily mobility goal to care team and patient/family/caregiver  - Collaborate with rehabilitation services on mobility goals if consulted  - Perform Range of Motion 2 times a day  - Reposition patient every 2 hours    - Dangle patient 2 times a day  - Stand patient 2 times a day  - Ambulate patient 2 times a day  - Out of bed to chair 2 times a day   - Out of bed for meals 3 times a day  - Out of bed for toileting  - Record patient progress and toleration of activity level   8/19/2022 2028 by Alesia Dao RN  Outcome: Completed  8/19/2022 1358 by Alesia Dao RN  Outcome: Progressing    times a day  - Out of bed for toileting  - Record patient progress and toleration of activity level   8/19/2022 2028 by Alesia Dao RN  Outcome: Completed  8/19/2022 1358 by Alesia Nolen RN  Outcome: Progressing

## 2022-08-22 ENCOUNTER — TELEPHONE (OUTPATIENT)
Dept: CARDIOLOGY CLINIC | Facility: CLINIC | Age: 82
End: 2022-08-22

## 2022-08-22 NOTE — TELEPHONE ENCOUNTER
Patient [facility]  She also has blood work when do you want the blood work done and when do you want to see her   Please advise

## 2022-08-22 NOTE — UTILIZATION REVIEW
Notification of Discharge   This is a Notification of Discharge from our facility [address]  Please be advised that this patient has been discharge from our facility  Below you will find the admission and discharge date and time including the patients disposition  UTILIZATION REVIEW CONTACT:  Jorge Anderson  Utilization   Network Utilization Review Department  Phone: [phone] x carefully listen to the prompts  All voicemails are confidential   Email: [email or web address]     PHYSICIAN ADVISORY SERVICES:  FOR [id number] REVIEW - MEDICAL NECESSITY DENIAL  Phone: [phone]  Fax: [phone]  Email: [email or web address]     PRESENTATION DATE: 8/14/2022 12:04 AM  OBERVATION ADMISSION DATE:   INPATIENT ADMISSION DATE: 8/14/22  1:18 AM   DISCHARGE DATE: 8/19/2022  3:42 PM  DISPOSITION: Home/Self Care Home/Self Care      IMPORTANT INFORMATION:  Send all requests for admission clinical reviews, approved or denied determinations and any other requests to dedicated fax number below belonging to the campus where the patient is receiving treatment   List of dedicated fax numbers:  [address] DENIALS (Administrative/Medical Necessity) [phone]   [address] (Maternity/NICU/Pediatrics) [phone]   Nimisha Shelley [phone]   130 [facility] [phone]   [facility] [place]  [phone]   2000 [facility] [address] [place] [address],[address] [phone]   [facility]  [phone]   2205 [facility]  [address] [phone]

## 2022-08-22 NOTE — TELEPHONE ENCOUNTER
She follows with [facility]    Please talk to patient was a you want to follow-up with us or she want to go back to her own cardiologist

## 2022-08-25 ENCOUNTER — TELEPHONE (OUTPATIENT)
Dept: INPATIENT UNIT | Facility: HOSPITAL | Age: 82
End: 2022-08-25

## 2022-08-26 ENCOUNTER — OFFICE VISIT (OUTPATIENT)
Dept: CARDIOLOGY CLINIC | Facility: CLINIC | Age: 82
End: 2022-08-26
Payer: COMMERCIAL

## 2022-08-26 VITALS
HEIGHT: 59 IN | BODY MASS INDEX: 21.57 KG/M2 | SYSTOLIC BLOOD PRESSURE: 120 MMHG | HEART RATE: 115 BPM | DIASTOLIC BLOOD PRESSURE: 78 MMHG | OXYGEN SATURATION: 98 % | TEMPERATURE: 97 F | WEIGHT: 107 LBS

## 2022-08-26 DIAGNOSIS — N18.9 CHRONIC RENAL IMPAIRMENT, UNSPECIFIED CKD STAGE: ICD-10-CM

## 2022-08-26 DIAGNOSIS — R06.02 SHORTNESS OF BREATH: ICD-10-CM

## 2022-08-26 DIAGNOSIS — I50.42 CHRONIC COMBINED SYSTOLIC AND DIASTOLIC CONGESTIVE HEART FAILURE (HCC): ICD-10-CM

## 2022-08-26 DIAGNOSIS — I48.91 ATRIAL FIBRILLATION WITH RVR (HCC): ICD-10-CM

## 2022-08-26 DIAGNOSIS — E78.5 HYPERLIPIDEMIA, UNSPECIFIED HYPERLIPIDEMIA TYPE: ICD-10-CM

## 2022-08-26 PROCEDURE — 99214 OFFICE O/P EST MOD 30 MIN: CPT | Performed by: INTERNAL MEDICINE

## 2022-08-26 PROCEDURE — 93000 ELECTROCARDIOGRAM COMPLETE: CPT | Performed by: INTERNAL MEDICINE

## 2022-08-26 RX ORDER — HYDROCHLOROTHIAZIDE 12.5 MG/1
TABLET ORAL
COMMUNITY
Start: 2022-07-01 | End: 2022-08-26

## 2022-08-26 NOTE — PROGRESS NOTES
Progress Note - Cardiology Office  [facility]    Quique Irving 80 y o  female MRN: [id number]  : 1940  Encounter: [id number]      Assessment:     1  Atrial fibrillation with RVR ([facility] [address] )    2  Shortness of breath    3  Chronic combined systolic and diastolic congestive heart failure ([facility] )    4  Hyperlipidemia, unspecified hyperlipidemia type    5  Chronic renal impairment, unspecified CKD stage        Discussion Summary and Plan:    1  Atrial fibrillation with rapid ventricular rate  Patient was admitted with AFib rapid ventricular rate and evidence of volume overload  She was euvolemic when discharged  Unfortunately she decrease her metoprolol by mistake to 50 mg twice a day  She will go back on 100 twice a day continue digoxin will check digoxin level above goal is to have heart rate between  beats per minute resting  Based on digoxin level we will give additional digoxin doses  2  Chronic systolic and diastolic heart failure  She is known to have cardiomyopathy her EF is around 30%  She does not want defibrillator  Hopefully when her heart rate is better her EF improved    3  Exertional shortness of breath  She is feeling much better she has no shortness of breath with normal activities  4  Dyslipidemia  Continue statins    5  CRI  She had history of CRI will check BMP  She is on Xarelto 15 mg daily as her weight is only 48 kg  She is also taking Lasix 20 mg every other day along with Aldactone 12 5 every other day and she is on heart failure medication with metoprolol and losartan  She came with her friend  All those issues discussed with them  She has blood test done 2022 which were acceptable BUN was slightly elevated 38  Encouraged her to drink little bit more water        Patient  And her friend was advised and educated to call our office  immediately if  patient has any new symptoms of chest pain/shortness of breath, near-syncope, syncope, light headedness sustained palpitations  or any other cardiovascular symptoms before their scheduled follow-up appointment  Office #[phone]  Please call [phone] if any questions  Counseling :  A description of the counseling  Goals and Barriers  Patient's ability to self care: Yes  Medication side effect reviewed with patient in detail and all their questions answered to their satisfaction  HPI :     Jane Diamond is a 80y o  year old female who came for follow up  Patient was in size recently admitted to [facility] with shortness of breath and was known to have chronic systolic and diastolic heart failure and chronic atrial fibrillation  She also has CRI  She used to follow with [facility] and has history of cardiomyopathy difficult to control rate  Her rate was better with beta-blockers and digoxin however she was not taking metoprolol 50 twice a day instead of 100 twice a day  She is also on heart failure medications  She came with her friend  Otherwise she is doing well leg edema has improved and her shortness of breath is much better  Echo Doppler shows her EF is around 30%  Review of Systems   Constitutional: Negative for activity change, chills, diaphoresis, fever and unexpected weight change  HENT: Negative for congestion  Eyes: Negative for discharge and redness  Respiratory: Negative for cough, chest tightness, shortness of breath and wheezing  Cardiovascular: Negative  Negative for chest pain, palpitations and leg swelling  Gastrointestinal: Negative for abdominal pain, diarrhea and nausea  Endocrine: Negative  Genitourinary: Negative for decreased urine volume and urgency  Musculoskeletal: Negative  Negative for arthralgias, back pain and gait problem  Skin: Negative for rash and wound  Allergic/Immunologic: Negative      Neurological: Negative for dizziness, seizures, syncope, weakness, light-headedness and headaches  Hematological: Negative  Psychiatric/Behavioral: Negative for agitation and confusion  The patient is not nervous/anxious  Historical Information   Past Medical History:   Diagnosis Date    Atrial fibrillation ([address] )     Hypercholesteremia      Past Surgical History:   Procedure Laterality Date    EMBOLIZATION      HERNIA REPAIR       Social History     Substance and Sexual Activity   Alcohol Use Yes    Alcohol/week: 7 0 standard drinks    Types: 7 Standard drinks or equivalent per week    Comment: one glass wine a day     Social History     Substance and Sexual Activity   Drug Use Not Currently     Social History     Tobacco Use   Smoking Status Former Smoker   Smokeless Tobacco Never Used     Family History: No family history on file  Meds/Allergies     No Known Allergies    Current Outpatient Medications:     atorvastatin (LIPITOR) 10 mg tablet, Take 10 mg by mouth daily, Disp: , Rfl:     digoxin (LANOXIN) 0 125 mg tablet, Take 1 tablet (125 mcg total) by mouth 2 (two) times a week, Disp: 30 tablet, Rfl: 0    furosemide (LASIX) 20 mg tablet, Take 1 tablet (20 mg total) by mouth every other day, Disp: 30 tablet, Rfl: 0    losartan (COZAAR) 25 mg tablet, Take 1 tablet (25 mg total) by mouth daily, Disp: 30 tablet, Rfl: 0    metoprolol tartrate (LOPRESSOR) 100 mg tablet, Take 1 tablet (100 mg total) by mouth every 12 (twelve) hours, Disp: 60 tablet, Rfl: 0    rivaroxaban (XARELTO) 15 mg tablet, Take 15 mg by mouth every evening, Disp: , Rfl:     spironolactone (ALDACTONE) 25 mg tablet, Take 0 5 tablets (12 5 mg total) by mouth every other day, Disp: 8 tablet, Rfl: 0    Vitals: Blood pressure 120/78, pulse (!) 115, temperature (!) 97 °F (36 1 °C), height 4' 11" (1 499 m), weight 48 5 kg (107 lb), SpO2 98 %  ?  Body mass index is 21 61 kg/m²    Wt Readings from Last 3 Encounters:   08/26/22 48 5 kg (107 lb)   08/19/22 49 7 kg (109 lb 8 oz)   08/22/21 55 8 kg (123 lb)     Vitals: 08/26/22 1506   Weight: 48 5 kg (107 lb)     BP Readings from Last 3 Encounters:   08/26/22 120/78   08/19/22 144/90   08/22/21 142/100       Physical Exam:  Neurologic:  Alert & oriented x 3, no new focal deficits, Not in any acute distress,  Constitutional:  Thinly built  Neck: Normal range of motion, no tenderness,  Neck supple   Respiratory:  Bilateral air entry, mostly clear to auscultation  Cardiovascular: S1-S2  irregularly irregular and tachycardia  GI:  Soft, nondistended,  nontender, no hepatosplenomegaly appreciated  Musculoskeletal:  No edema, no tenderness, no deformities  Skin:  Well hydrated, no rash   Extremities:  No edema and distal pulses are present  Psychiatric:  Speech and behavior appropriate         Diagnostic Studies Review Cardio:  Echo Doppler reviewed  EKG:  Twelve lead EKG 08/26/2022 shows with heart rate 115 beats per minute rare PVC noted    XR chest 1 view portable    Result Date: 8/14/2022  Narrative: CHEST INDICATION:   swelling  COMPARISON:  Chest radiograph from 11/6/2015  EXAM PERFORMED/VIEWS:  XR CHEST PORTABLE FINDINGS: Cardiomediastinal silhouette appears unremarkable  The lungs are clear  No pneumothorax or pleural effusion  Osseous structures appear within normal limits for patient age  Impression: No acute cardiopulmonary disease  Workstation performed: [id number]     Echo complete w/ contrast if indicated    Result Date: 8/15/2022  Narrative: [facility]  Left Ventricle: Left ventricular cavity size is normal  Wall thickness is normal  Systolic function is severely reduced  Estimated ejection fraction is 25-30%  Wall motion cannot be accurately assessed  Diastolic dysfunction, grade indeterminate due to underlying atrial fibrillation    Right Ventricle: Systolic function is reduced    Left Atrium: The atrium is severely dilated    Right Atrium: The atrium is moderately dilated    Mitral Valve: There is annular calcification  There is mild regurgitation    Tricuspid Valve: There is mild regurgitation  The right ventricular systolic pressure is mildly elevated  Lab Review   Lab Results   Component Value Date    WBC 6 56 08/16/2022    HGB 15 0 08/16/2022    HCT 45 2 08/16/2022    MCV 95 08/16/2022    RDW 12 9 08/16/2022     08/16/2022     BMP:  Lab Results   Component Value Date    SODIUM 138 08/19/2022    K 4 4 08/19/2022     08/19/2022    CO2 27 08/19/2022    ANIONGAP 11 7 11/07/2015    BUN 38 (H) 08/19/2022    CREATININE 1 11 08/19/2022    GLUC 112 08/19/2022    CALCIUM 8 7 08/19/2022    CORRECTEDCA 9 3 08/15/2022    EGFR 46 08/19/2022    MG 2 3 04/27/2016     Troponins:    LFT:  Lab Results   Component Value Date    AST 16 08/15/2022    ALT 13 08/15/2022    ALKPHOS 100 08/15/2022    TP 6 5 08/15/2022    ALB 2 9 (L) 08/15/2022        Lab Results   Component Value Date    HGBA1C 6 0 (H) 04/27/2016     Lipid Profile:   Lab Results   Component Value Date    HDL 42 11/07/2015    LDLCALC 76 11/07/2015    TRIG 103 11/07/2015     No results found for: CHOLESTEROL  Lab Results   Component Value Date    TROPONINI <0 02 11/06/2015     Lab Results   Component Value Date    NTBNP 2,787 (H) 08/14/2022            Dr Janet Ramey MD [facility]      "This note has been constructed using a voice recognition system  Therefore there may be syntax, spelling, and/or grammatical errors   Please call if you have any questions  "

## 2022-08-31 DIAGNOSIS — I50.41 ACUTE COMBINED SYSTOLIC AND DIASTOLIC CONGESTIVE HEART FAILURE (HCC): ICD-10-CM

## 2022-08-31 DIAGNOSIS — I48.91 ATRIAL FIBRILLATION WITH RVR (HCC): ICD-10-CM

## 2022-08-31 RX ORDER — FUROSEMIDE 20 MG/1
20 TABLET ORAL EVERY OTHER DAY
Qty: 15 TABLET | Refills: 5 | Status: SHIPPED | OUTPATIENT
Start: 2022-08-31

## 2022-08-31 RX ORDER — SPIRONOLACTONE 25 MG/1
12.5 TABLET ORAL EVERY OTHER DAY
Qty: 15 TABLET | Refills: 5 | Status: SHIPPED | OUTPATIENT
Start: 2022-08-31 | End: 2022-09-30

## 2022-08-31 RX ORDER — METOPROLOL TARTRATE 100 MG/1
100 TABLET ORAL EVERY 12 HOURS SCHEDULED
Qty: 60 TABLET | Refills: 5 | Status: SHIPPED | OUTPATIENT
Start: 2022-08-31 | End: 2022-09-30

## 2022-08-31 RX ORDER — LOSARTAN POTASSIUM 25 MG/1
25 TABLET ORAL DAILY
Qty: 30 TABLET | Refills: 5 | Status: SHIPPED | OUTPATIENT
Start: 2022-08-31

## 2022-08-31 RX ORDER — DIGOXIN 125 MCG
125 TABLET ORAL 2 TIMES WEEKLY
Qty: 30 TABLET | Refills: 5 | Status: SHIPPED | OUTPATIENT
Start: 2022-09-01

## 2022-11-02 ENCOUNTER — OFFICE VISIT (OUTPATIENT)
Dept: FAMILY MEDICINE CLINIC | Facility: CLINIC | Age: 82
End: 2022-11-02

## 2022-11-02 VITALS
WEIGHT: 103 LBS | HEART RATE: 98 BPM | TEMPERATURE: 98.8 F | OXYGEN SATURATION: 97 % | DIASTOLIC BLOOD PRESSURE: 84 MMHG | HEIGHT: 59 IN | SYSTOLIC BLOOD PRESSURE: 140 MMHG | BODY MASS INDEX: 20.76 KG/M2

## 2022-11-02 DIAGNOSIS — E78.5 HYPERLIPIDEMIA, UNSPECIFIED HYPERLIPIDEMIA TYPE: ICD-10-CM

## 2022-11-02 DIAGNOSIS — E44.1 MILD PROTEIN-CALORIE MALNUTRITION (HCC): ICD-10-CM

## 2022-11-02 DIAGNOSIS — Z78.0 MENOPAUSE: ICD-10-CM

## 2022-11-02 DIAGNOSIS — I48.91 ATRIAL FIBRILLATION WITH RVR (HCC): ICD-10-CM

## 2022-11-02 DIAGNOSIS — Z12.31 SCREENING MAMMOGRAM FOR HIGH-RISK PATIENT: ICD-10-CM

## 2022-11-02 DIAGNOSIS — I50.41 ACUTE COMBINED SYSTOLIC AND DIASTOLIC CONGESTIVE HEART FAILURE (HCC): Primary | ICD-10-CM

## 2022-11-02 DIAGNOSIS — I73.9 PAD (PERIPHERAL ARTERY DISEASE) (HCC): ICD-10-CM

## 2022-11-02 DIAGNOSIS — I10 PRIMARY HYPERTENSION: ICD-10-CM

## 2022-11-02 DIAGNOSIS — Z00.00 WELL ADULT EXAM: ICD-10-CM

## 2022-11-02 NOTE — PROGRESS NOTES
Name: Ang Hernandez      : 1940      MRN: [id number]  Encounter Provider: Ledy Valverde MD  Encounter Date: 2022   Encounter department: [address] [place] [address] Place     1  Acute combined systolic and diastolic congestive heart failure (HCC)  Assessment & Plan:  Wt Readings from Last 3 Encounters:   22 48 5 kg (107 lb)   22 49 7 kg (109 lb 8 oz)   21 55 8 kg (123 lb)   Patient is stable  and will continue present plan of care and reassess at next routine visit  All questions about this problem from patient were answered today  2  Atrial fibrillation with RVR (HCC)  Assessment & Plan:  Continue with anticogulation and rate control of heart rate  Concerns about condition were addressed today  Orders:  -     TSH + Free T4; Future  -     Comprehensive metabolic panel; Future  -     CBC and differential; Future  -     Magnesium; Future  -     Uric acid; Future  -     Urinalysis with microscopic    3  Hyperlipidemia, unspecified hyperlipidemia type  Assessment & Plan:  Patient  is stable with current medication and we discussed a low fat low cholesterol diet  Weight loss also discussed for this will help lower cholesterol also  Recheck lipids in 6 months  Orders:  -     Lipid Panel with Direct LDL reflex; Future    4  Well adult exam    5  Screening mammogram for high-risk patient    6  Menopause    7  Primary hypertension  Assessment & Plan:  Patient is stable with current anti-hypertensive medicine and continue to follow a low sodium diet and take current medication  All questions about this condition were answered today  8  Mild protein-calorie malnutrition ([address] )    9  PAD (peripheral artery disease) ([address] )        Falls Plan of Care: balance, strength, and gait training instructions were provided  Home safety education provided       Urinary Incontinence Plan of Care: counseling topics discussed: practice Rasheeda (pelvic floor strengthening) exercises, use restroom every 2 hours, limit alcohol, caffeine, spicy foods, and acidic foods, limiting fluid intake 3-4 hours before bed, weight loss, preventing constipation and limiting fluid intake to 60 oz  per day  Subjective     HPI  Review of Systems   Constitutional: Negative for activity change, appetite change, fatigue and fever  HENT: Negative for congestion, ear pain, postnasal drip, rhinorrhea, sinus pressure, sinus pain, sneezing and sore throat  Eyes: Negative for pain and redness  Respiratory: Negative for apnea, cough, chest tightness, shortness of breath and wheezing  Cardiovascular: Negative for chest pain, palpitations and leg swelling  Gastrointestinal: Negative for abdominal pain, constipation, diarrhea, nausea and vomiting  Endocrine: Negative for cold intolerance and heat intolerance  Genitourinary: Negative for difficulty urinating, dysuria, frequency, hematuria and urgency  Musculoskeletal: Negative for arthralgias, back pain, gait problem and myalgias  Skin: Negative for rash  Neurological: Negative for dizziness, speech difficulty, weakness, numbness and headaches  Hematological: Does not bruise/bleed easily  Psychiatric/Behavioral: Negative for agitation, confusion and hallucinations  Past Medical History:   Diagnosis Date   • Atrial fibrillation ([facility] [address] )    • Hypercholesteremia      Past Surgical History:   Procedure Laterality Date   • EMBOLIZATION     • HERNIA REPAIR       History reviewed  No pertinent family history  Social History     Socioeconomic History   • Marital status:       Spouse name: None   • Number of children: None   • Years of education: None   • Highest education level: None   Occupational History   • None   Tobacco Use   • Smoking status: Former Smoker     Quit date:      Years since quittin 8   • Smokeless tobacco: Never Used   Vaping Use   • Vaping Use: Never used   Substance and Sexual Activity • Alcohol use: Yes     Alcohol/week: 7 0 standard drinks     Types: 7 Standard drinks or equivalent per week     Comment: one glass wine a day   • Drug use: Not Currently   • Sexual activity: None   Other Topics Concern   • None   Social History Narrative   • None     Social Determinants of Health     Financial Resource Strain: Not on file   Food Insecurity: No Food Insecurity   • Worried About Running Out of Food in the Last Year: Never true   • Ran Out of Food in the Last Year: Never true   Transportation Needs: No Transportation Needs   • Lack of Transportation (Medical): No   • Lack of Transportation (Non-Medical):  No   Physical Activity: Not on file   Stress: Not on file   Social Connections: Not on file   Intimate Partner Violence: Not on file   Housing Stability: Low Risk    • Unable to Pay for Housing in the Last Year: No   • Number of Places Lived in the Last Year: 1   • Unstable Housing in the Last Year: No     Current Outpatient Medications on File Prior to Visit   Medication Sig   • atorvastatin (LIPITOR) 10 mg tablet Take 10 mg by mouth daily   • digoxin (LANOXIN) 0 125 mg tablet Take 1 tablet (125 mcg total) by mouth 2 (two) times a week   • furosemide (LASIX) 20 mg tablet Take 1 tablet (20 mg total) by mouth every other day   • losartan (COZAAR) 25 mg tablet Take 1 tablet (25 mg total) by mouth daily   • metoprolol tartrate (LOPRESSOR) 100 mg tablet Take 1 tablet (100 mg total) by mouth every 12 (twelve) hours   • rivaroxaban (XARELTO) 15 mg tablet Take 15 mg by mouth every evening   • spironolactone (ALDACTONE) 25 mg tablet Take 0 5 tablets (12 5 mg total) by mouth every other day     No Known Allergies  Immunization History   Administered Date(s) Administered   • COVID-19 PFIZER VACCINE 0 3 ML IM 03/10/2021, 03/31/2021   • Tdap 08/22/2021       Objective     /84 (BP Location: Right arm, Patient Position: Sitting, Cuff Size: Standard)   Pulse 98   Temp 98 8 °F (37 1 °C)   Ht 4' 11" (1 499 m)   Wt 46 7 kg (103 lb)   SpO2 97%   BMI 20 80 kg/m²     Physical Exam  Vitals and nursing note reviewed  Constitutional:       Appearance: She is well-developed  She is obese  HENT:      Head: Normocephalic and atraumatic  Nose: Nose normal       Mouth/Throat:      Mouth: Mucous membranes are moist    Eyes:      General: No scleral icterus  Conjunctiva/sclera: Conjunctivae normal       Pupils: Pupils are equal, round, and reactive to light  Neck:      Thyroid: No thyromegaly  Cardiovascular:      Rate and Rhythm: Normal rate  Rhythm irregular  Pulmonary:      Effort: Pulmonary effort is normal       Breath sounds: Normal breath sounds  No wheezing  Abdominal:      General: Bowel sounds are normal  There is no distension  Palpations: Abdomen is soft  Tenderness: There is no abdominal tenderness  There is no guarding or rebound  Musculoskeletal:         General: No tenderness or deformity  Normal range of motion  Cervical back: Normal range of motion and neck supple  Skin:     General: Skin is warm and dry  Findings: No erythema or rash  Neurological:      Mental Status: She is alert and oriented to person, place, and time  Sensory: No sensory deficit  Psychiatric:         Mood and Affect: Mood normal          Behavior: Behavior normal          Thought Content:  Thought content normal          Judgment: Judgment normal        Elizabeth Jay MD

## 2022-11-02 NOTE — ASSESSMENT & PLAN NOTE
Wt Readings from Last 3 Encounters:   08/26/22 48 5 kg (107 lb)   08/19/22 49 7 kg (109 lb 8 oz)   08/22/21 55 8 kg (123 lb)   Patient is stable  and will continue present plan of care and reassess at next routine visit  All questions about this problem from patient were answered today

## 2022-11-30 ENCOUNTER — OFFICE VISIT (OUTPATIENT)
Dept: CARDIOLOGY CLINIC | Facility: CLINIC | Age: 82
End: 2022-11-30

## 2022-11-30 VITALS
OXYGEN SATURATION: 96 % | SYSTOLIC BLOOD PRESSURE: 140 MMHG | WEIGHT: 104 LBS | HEART RATE: 120 BPM | DIASTOLIC BLOOD PRESSURE: 98 MMHG | HEIGHT: 59 IN | TEMPERATURE: 96 F | BODY MASS INDEX: 20.96 KG/M2

## 2022-11-30 DIAGNOSIS — I48.20 CHRONIC ATRIAL FIBRILLATION (HCC): ICD-10-CM

## 2022-11-30 DIAGNOSIS — R06.02 SHORTNESS OF BREATH: ICD-10-CM

## 2022-11-30 DIAGNOSIS — N18.9 CHRONIC RENAL IMPAIRMENT, UNSPECIFIED CKD STAGE: ICD-10-CM

## 2022-11-30 DIAGNOSIS — I48.91 ATRIAL FIBRILLATION WITH RVR (HCC): ICD-10-CM

## 2022-11-30 DIAGNOSIS — I50.42 CHRONIC COMBINED SYSTOLIC AND DIASTOLIC CONGESTIVE HEART FAILURE (HCC): ICD-10-CM

## 2022-11-30 DIAGNOSIS — E78.5 HYPERLIPIDEMIA, UNSPECIFIED HYPERLIPIDEMIA TYPE: ICD-10-CM

## 2022-11-30 RX ORDER — DIGOXIN 125 MCG
125 TABLET ORAL
Qty: 21 TABLET | Refills: 5 | Status: SHIPPED | OUTPATIENT
Start: 2022-12-01

## 2022-11-30 RX ORDER — METOPROLOL SUCCINATE 25 MG/1
25 TABLET, EXTENDED RELEASE ORAL 2 TIMES DAILY
Qty: 180 TABLET | Refills: 1 | Status: SHIPPED | OUTPATIENT
Start: 2022-11-30

## 2022-11-30 RX ORDER — ATORVASTATIN CALCIUM 10 MG/1
10 TABLET, FILM COATED ORAL DAILY
Qty: 90 TABLET | Refills: 1 | Status: SHIPPED | OUTPATIENT
Start: 2022-11-30

## 2022-11-30 NOTE — PROGRESS NOTES
Progress Note - Cardiology Office  [facility]    Isi Schwarz 80 y o  female MRN: [id number]  : 1940  Encounter: [id number]      Assessment:     1  Shortness of breath    2  Chronic atrial fibrillation (HCC)    3  Chronic combined systolic and diastolic congestive heart failure ([facility] )    4  Chronic renal impairment, unspecified CKD stage    5  Hyperlipidemia, unspecified hyperlipidemia type    6  Atrial fibrillation with RVR ([facility] )        Discussion Summary and Plan:    1  Atrial fibrillation with rapid ventricular rate  Patient's heart rate remained faster  She has appeared to be euvolemic  Will increase metoprolol to total dose of 125 twice a day and digoxin 0 1255 days a week and will check digoxin level in 2 weeks and blood test     2  Chronic systolic and diastolic heart failure  She is known to have cardiomyopathy her EF is around 30%  She does not want defibrillator  Hopefully when her heart rate improves her EF will be better  3  Exertional shortness of breath  She is feeling much better she has no shortness of breath with normal activities  No evidence of volume overload    4  Dyslipidemia  Continue statins    5  CRI  She is on Xarelto 15 mg daily as her weight is only 48 kg  She is also taking Lasix 20 mg every other day along with Aldactone 12 5 every other day and she is on heart failure medication with metoprolol and losartan  She came with her friend  All those issues discussed with them  She has blood test done repeat blood test ordered  Plan  Increase metoprolol XL to 125 mg twice a day    Increase digoxin to 0 125 mg 5 days a week    Check electrolytes and digoxin level in about 2-3 weeks    Holter monitor    Continue other cardiac medication as before  Follow-up 3 months        Patient  And her friend was advised and educated to call our office  immediately if  patient has any new symptoms of chest pain/shortness of breath, near-syncope, syncope, light headedness sustained palpitations  or any other cardiovascular symptoms before their scheduled follow-up appointment  Office #[phone]  Please call [phone] if any questions  Counseling :  A description of the counseling  Goals and Barriers  Patient's ability to self care: Yes  Medication side effect reviewed with patient in detail and all their questions answered to their satisfaction  HPI :     Tonia Valenzuela is a 80y o  year old female who came for follow up  Patient was in size recently admitted to [facility] with shortness of breath and was known to have chronic systolic and diastolic heart failure and chronic atrial fibrillation  She also has CRI  She used to follow with [facility] and has history of cardiomyopathy difficult to control rate  Her rate was better with beta-blockers and digoxin however she was not taking metoprolol 50 twice a day instead of 100 twice a day  She is also on heart failure medications  She came with her friend  Otherwise she is doing well leg edema has improved and her shortness of breath is much better  Echo Doppler shows her EF is around 30%  11/30/2022  Above reviewed  Patient came follow-up  She was admitted to [facility] with AFib with rapid ventricular rate and chronic systolic and diastolic heart failure  History of chronic atrial fibrillation  She used to follow with [facility] and had history of difficulty control of heart rate  She is known to have EF around 35 40%  Her heart rate is better with beta-blocker and digoxin however she was not taking the full dose  Her blood test from August 2022 reviewed  Her heart rate still remains elevated  Is around 120 beats per minute  She is taking metoprolol 100 twice a day and digoxin twice a week  Review of Systems   Constitutional: Negative for activity change, chills, diaphoresis, fever and unexpected weight change  HENT: Negative for congestion  Eyes: Negative for discharge and redness  Respiratory: Positive for shortness of breath  Negative for cough, chest tightness and wheezing  Exertional   Cardiovascular: Negative  Negative for chest pain, palpitations and leg swelling  No leg swelling   Gastrointestinal: Negative for abdominal pain, diarrhea and nausea  Endocrine: Negative  Genitourinary: Negative for decreased urine volume and urgency  Musculoskeletal: Negative  Negative for arthralgias, back pain and gait problem  Skin: Negative for rash and wound  Allergic/Immunologic: Negative  Neurological: Negative for dizziness, seizures, syncope, weakness, light-headedness and headaches  Hematological: Negative  Psychiatric/Behavioral: Negative for agitation and confusion  The patient is not nervous/anxious  Historical Information   Past Medical History:   Diagnosis Date   • Atrial fibrillation ([facility] [address] )    • Hypercholesteremia      Past Surgical History:   Procedure Laterality Date   • EMBOLIZATION     • HERNIA REPAIR       Social History     Substance and Sexual Activity   Alcohol Use Yes   • Alcohol/week: 7 0 standard drinks   • Types: 7 Standard drinks or equivalent per week    Comment: one glass wine a day     Social History     Substance and Sexual Activity   Drug Use Not Currently     Social History     Tobacco Use   Smoking Status Former   • Types: Cigarettes   • Quit date:    • Years since quittin 9   Smokeless Tobacco Never     Family History: History reviewed  No pertinent family history      Meds/Allergies     No Known Allergies    Current Outpatient Medications:   •  atorvastatin (LIPITOR) 10 mg tablet, Take 1 tablet (10 mg total) by mouth daily, Disp: 90 tablet, Rfl: 1  •  [START ON 2022] digoxin (LANOXIN) 0 125 mg tablet, Take 1 tablet (125 mcg total) by mouth 4 (four) times a week Advised patient to take 5 times weekly, Disp: 21 tablet, Rfl: 5  • furosemide (LASIX) 20 mg tablet, Take 1 tablet (20 mg total) by mouth every other day, Disp: 15 tablet, Rfl: 5  •  losartan (COZAAR) 25 mg tablet, Take 1 tablet (25 mg total) by mouth daily, Disp: 30 tablet, Rfl: 5  •  metoprolol succinate (TOPROL-XL) 25 mg 24 hr tablet, Take 1 tablet (25 mg total) by mouth 2 (two) times a day, Disp: 180 tablet, Rfl: 1  •  metoprolol tartrate (LOPRESSOR) 100 mg tablet, Take 1 tablet (100 mg total) by mouth every 12 (twelve) hours, Disp: 60 tablet, Rfl: 5  •  rivaroxaban (XARELTO) 15 mg tablet, Take 1 tablet (15 mg total) by mouth every evening, Disp: 90 tablet, Rfl: 1  •  spironolactone (ALDACTONE) 25 mg tablet, Take 0 5 tablets (12 5 mg total) by mouth every other day, Disp: 15 tablet, Rfl: 5    Vitals: Blood pressure 140/98, pulse (!) 120, temperature (!) 96 °F (35 6 °C), height 4' 11" (1 499 m), weight 47 2 kg (104 lb), SpO2 96 %  ?  Body mass index is 21 01 kg/m²  Wt Readings from Last 3 Encounters:   11/30/22 47 2 kg (104 lb)   11/02/22 46 7 kg (103 lb)   08/26/22 48 5 kg (107 lb)     Vitals:    11/30/22 1247   Weight: 47 2 kg (104 lb)     BP Readings from Last 3 Encounters:   11/30/22 140/98   11/02/22 140/84   08/26/22 120/78         Physical Exam    Neurologic:  Alert & oriented x 3, no new focal deficits, Not in any acute distress,  Constitutional:    Adequate built,  non-toxic appearance   Neck: No tenderness,  Neck supple, No JVP,   Respiratory:  Bilateral air entry, mostly clear to auscultation  Cardiovascular:  S1 and S2 irregularly irregular, tachycardic  GI:  Soft, nondistended, no hepatosplenomegaly appreciated  Musculoskeletal:  No edema, no tenderness, no deformities  Skin:  Well hydrated, no rash   Extremities:  No edema and distal pulses are present  Psychiatric:  Speech and behavior appropriate       Diagnostic Studies Review Cardio:  Echo Doppler reviewed      EKG:  Twelve lead EKG 08/26/2022 shows with heart rate 115 beats per minute rare PVC noted    Twelve lead EKG done on 11/30/2022 shows atrial fibrillation with heart rate 120 beats per minute  As compared to previous EKG no changes heart remains elevated    XR chest 1 view portable    Result Date: 8/14/2022  Narrative: CHEST INDICATION:   swelling  COMPARISON:  Chest radiograph from 11/6/2015  EXAM PERFORMED/VIEWS:  XR CHEST PORTABLE FINDINGS: Cardiomediastinal silhouette appears unremarkable  The lungs are clear  No pneumothorax or pleural effusion  Osseous structures appear within normal limits for patient age  Impression: No acute cardiopulmonary disease  Workstation performed: [id number]     Echo complete w/ contrast if indicated    Result Date: 8/15/2022  Narrative: •  Left Ventricle: Left ventricular cavity size is normal  Wall thickness is normal  Systolic function is severely reduced  Estimated ejection fraction is 25-30%  Wall motion cannot be accurately assessed  Diastolic dysfunction, grade indeterminate due to underlying atrial fibrillation  •  Right Ventricle: Systolic function is reduced  •  Left Atrium: The atrium is severely dilated  •  Right Atrium: The atrium is moderately dilated  •  Mitral Valve: There is annular calcification  There is mild regurgitation  •  Tricuspid Valve: There is mild regurgitation  The right ventricular systolic pressure is mildly elevated           Lab Review   Lab Results   Component Value Date    WBC 6 56 08/16/2022    HGB 15 0 08/16/2022    HCT 45 2 08/16/2022    MCV 95 08/16/2022    RDW 12 9 08/16/2022     08/16/2022     BMP:  Lab Results   Component Value Date    SODIUM 138 08/19/2022    K 4 4 08/19/2022     08/19/2022    CO2 27 08/19/2022    ANIONGAP 11 7 11/07/2015    BUN 38 (H) 08/19/2022    CREATININE 1 11 08/19/2022    GLUC 112 08/19/2022    CALCIUM 8 7 08/19/2022    CORRECTEDCA 9 3 08/15/2022    EGFR 46 08/19/2022    MG 2 3 04/27/2016     Troponins:    LFT:  Lab Results   Component Value Date    AST 16 08/15/2022    ALT 13 08/15/2022    ALKPHOS 100 08/15/2022    TP 6 5 08/15/2022    ALB 2 9 (L) 08/15/2022        Lab Results   Component Value Date    HGBA1C 6 0 (H) 04/27/2016     Lipid Profile:   Lab Results   Component Value Date    HDL 42 11/07/2015    LDLCALC 76 11/07/2015    TRIG 103 11/07/2015     No results found for: CHOLESTEROL  Lab Results   Component Value Date    TROPONINI <0 02 11/06/2015     Lab Results   Component Value Date    NTBNP 2,787 (H) 08/14/2022          Dr Miguel Lenz MD [facility]      "This note has been constructed using a voice recognition system  Therefore there may be syntax, spelling, and/or grammatical errors   Please call if you have any questions  "

## 2022-11-30 NOTE — PATIENT INSTRUCTIONS
Increase metoprolol XL to 125 mg twice a day    Increase digoxin to 0 125 mg 5 days a week    Check electrolytes and digoxin level in about 2-3 weeks    Holter monitor    Continue other cardiac medication as before  Follow-up 3 months

## 2022-12-06 ENCOUNTER — HOSPITAL ENCOUNTER (OUTPATIENT)
Dept: NON INVASIVE DIAGNOSTICS | Facility: HOSPITAL | Age: 82
Discharge: HOME | End: 2022-12-06
Attending: INTERNAL MEDICINE

## 2022-12-06 DIAGNOSIS — N18.9 CHRONIC RENAL IMPAIRMENT, UNSPECIFIED CKD STAGE: ICD-10-CM

## 2022-12-06 DIAGNOSIS — I50.42 CHRONIC COMBINED SYSTOLIC AND DIASTOLIC CONGESTIVE HEART FAILURE (HCC): ICD-10-CM

## 2022-12-06 DIAGNOSIS — I48.20 CHRONIC ATRIAL FIBRILLATION (HCC): ICD-10-CM

## 2022-12-06 DIAGNOSIS — E78.5 HYPERLIPIDEMIA, UNSPECIFIED HYPERLIPIDEMIA TYPE: ICD-10-CM

## 2022-12-06 DIAGNOSIS — R06.02 SHORTNESS OF BREATH: ICD-10-CM

## 2022-12-06 DIAGNOSIS — I48.91 ATRIAL FIBRILLATION WITH RVR (HCC): ICD-10-CM

## 2022-12-12 ENCOUNTER — TELEPHONE (OUTPATIENT)
Dept: CARDIOLOGY CLINIC | Facility: CLINIC | Age: 82
End: 2022-12-12

## 2022-12-12 NOTE — TELEPHONE ENCOUNTER
----- Message from Linda Amaya MD sent at 12/9/2022  5:03 PM EST -----  Patient's average heart rate now has improved around 87 bpm   Continue current medications  We should check her digoxin level

## 2023-01-10 LAB
ALBUMIN SERPL-MCNC: 4.3 G/DL (ref 3.6–5.1)
ALBUMIN/GLOB SERPL: 1.3 (CALC) (ref 1–2.5)
ALP SERPL-CCNC: 75 U/L (ref 37–153)
ALT SERPL-CCNC: 9 U/L (ref 6–29)
AST SERPL-CCNC: 17 U/L (ref 10–35)
BASOPHILS # BLD AUTO: 62 CELLS/UL (ref 0–200)
BASOPHILS NFR BLD AUTO: 1.1 %
BILIRUB SERPL-MCNC: 1.5 MG/DL (ref 0.2–1.2)
BUN SERPL-MCNC: 29 MG/DL (ref 7–25)
BUN/CREAT SERPL: 24 (CALC) (ref 6–22)
CALCIUM SERPL-MCNC: 9.6 MG/DL (ref 8.6–10.4)
CHLORIDE SERPL-SCNC: 99 MMOL/L (ref 98–110)
CHOLEST SERPL-MCNC: 136 MG/DL
CHOLEST/HDLC SERPL: 4.9 (CALC)
CO2 SERPL-SCNC: 31 MMOL/L (ref 20–32)
CREAT SERPL-MCNC: 1.21 MG/DL (ref 0.6–0.95)
EOSINOPHIL # BLD AUTO: 112 CELLS/UL (ref 15–500)
EOSINOPHIL NFR BLD AUTO: 2 %
ERYTHROCYTE [DISTWIDTH] IN BLOOD BY AUTOMATED COUNT: 12.3 % (ref 11–15)
GFR/BSA.PRED SERPLBLD CYS-BASED-ARV: 45 ML/MIN/1.73M2
GLOBULIN SER CALC-MCNC: 3.2 G/DL (CALC) (ref 1.9–3.7)
GLUCOSE SERPL-MCNC: 114 MG/DL (ref 65–99)
HCT VFR BLD AUTO: 41.7 % (ref 35–45)
HDLC SERPL-MCNC: 28 MG/DL
HGB BLD-MCNC: 14.3 G/DL (ref 11.7–15.5)
LDLC SERPL CALC-MCNC: 81 MG/DL (CALC)
LYMPHOCYTES # BLD AUTO: 1467 CELLS/UL (ref 850–3900)
LYMPHOCYTES NFR BLD AUTO: 26.2 %
MAGNESIUM SERPL-MCNC: 2.2 MG/DL (ref 1.5–2.5)
MCH RBC QN AUTO: 31.5 PG (ref 27–33)
MCHC RBC AUTO-ENTMCNC: 34.3 G/DL (ref 32–36)
MCV RBC AUTO: 91.9 FL (ref 80–100)
MONOCYTES # BLD AUTO: 470 CELLS/UL (ref 200–950)
MONOCYTES NFR BLD AUTO: 8.4 %
NEUTROPHILS # BLD AUTO: 3489 CELLS/UL (ref 1500–7800)
NEUTROPHILS NFR BLD AUTO: 62.3 %
NONHDLC SERPL-MCNC: 108 MG/DL (CALC)
PLATELET # BLD AUTO: 243 THOUSAND/UL (ref 140–400)
PMV BLD REES-ECKER: 10.9 FL (ref 7.5–12.5)
POTASSIUM SERPL-SCNC: 5 MMOL/L (ref 3.5–5.3)
PROT SERPL-MCNC: 7.5 G/DL (ref 6.1–8.1)
RBC # BLD AUTO: 4.54 MILLION/UL (ref 3.8–5.1)
SODIUM SERPL-SCNC: 138 MMOL/L (ref 135–146)
T4 FREE SERPL-MCNC: 1 NG/DL (ref 0.8–1.8)
TRIGL SERPL-MCNC: 178 MG/DL
TSH SERPL-ACNC: 2.05 MIU/L (ref 0.4–4.5)
URATE SERPL-MCNC: 8.8 MG/DL (ref 2.5–7)
WBC # BLD AUTO: 5.6 THOUSAND/UL (ref 3.8–10.8)

## 2023-02-01 ENCOUNTER — RA CDI HCC (OUTPATIENT)
Dept: OTHER | Facility: HOSPITAL | Age: 83
End: 2023-02-01

## 2023-02-01 NOTE — PROGRESS NOTES
[facility]  coding opportunities     I11 0, I13 0, E11 22, E11 51     Chart Reviewed number of suggestions sent to Provider: 4     Patients Insurance     Medicare Insurance: [employer]

## 2023-02-01 NOTE — PROGRESS NOTES
Name: Evalee Goldmann      : 1940      MRN: [id number]  Encounter Provider: Ana Preciado MD  Encounter Date: 2023   Encounter department: [address] [place] [address] Place     1  Well adult exam    2  Primary hypertension  Assessment & Plan:  Patient is stable with current anti-hypertensive medicine and continue to follow a low sodium diet and take current medication  All questions about this condition were answered today  3  PAD (peripheral artery disease) ([place] [address] )  Assessment & Plan:  Patient is stable  and will continue present plan of care and reassess at next routine visit  All questions about this problem from patient were answered today  4  Atrial fibrillation with RVR (HCC)  Assessment & Plan:  Continue with anticogulation and rate control of heart rate  Concerns about condition were addressed today  5  Hyperlipidemia, unspecified hyperlipidemia type  Assessment & Plan:  Patient  is stable with current medication and we discussed a low fat low cholesterol diet  Weight loss also discussed for this will help lower cholesterol also  Recheck lipids in 6 months  6  Age-related osteoporosis without current pathological fracture        Falls Plan of Care: balance, strength, and gait training instructions were provided  Home safety education provided  Urinary Incontinence Plan of Care: counseling topics discussed: practice Kegel (pelvic floor strengthening) exercises, use restroom every 2 hours, limit alcohol, caffeine, spicy foods, and acidic foods, limiting fluid intake 3-4 hours before bed, weight loss, preventing constipation and limiting fluid intake to 60 oz  per day  Subjective     HPI  Review of Systems    Past Medical History:   Diagnosis Date   • Atrial fibrillation ([facility] )    • Hypercholesteremia      Past Surgical History:   Procedure Laterality Date   • EMBOLIZATION     • HERNIA REPAIR       No family history on file    Social History Socioeconomic History   • Marital status:      Spouse name: Not on file   • Number of children: Not on file   • Years of education: Not on file   • Highest education level: Not on file   Occupational History   • Not on file   Tobacco Use   • Smoking status: Former     Types: Cigarettes     Quit date: 2006     Years since quittin 0   • Smokeless tobacco: Never   Vaping Use   • Vaping Use: Never used   Substance and Sexual Activity   • Alcohol use: Yes     Alcohol/week: 7 0 standard drinks     Types: 7 Standard drinks or equivalent per week     Comment: one glass wine a day   • Drug use: Not Currently   • Sexual activity: Not on file   Other Topics Concern   • Not on file   Social History Narrative   • Not on file     Social Determinants of Health     Financial Resource Strain: Not on file   Food Insecurity: No Food Insecurity   • Worried About Running Out of Food in the Last Year: Never true   • Ran Out of Food in the Last Year: Never true   Transportation Needs: No Transportation Needs   • Lack of Transportation (Medical): No   • Lack of Transportation (Non-Medical):  No   Physical Activity: Not on file   Stress: Not on file   Social Connections: Not on file   Intimate Partner Violence: Not on file   Housing Stability: Low Risk    • Unable to Pay for Housing in the Last Year: No   • Number of Places Lived in the Last Year: 1   • Unstable Housing in the Last Year: No     Current Outpatient Medications on File Prior to Visit   Medication Sig   • atorvastatin (LIPITOR) 10 mg tablet Take 1 tablet (10 mg total) by mouth daily   • digoxin (LANOXIN) 0 125 mg tablet Take 1 tablet (125 mcg total) by mouth 4 (four) times a week Advised patient to take 5 times weekly   • furosemide (LASIX) 20 mg tablet Take 1 tablet (20 mg total) by mouth every other day   • losartan (COZAAR) 25 mg tablet Take 1 tablet (25 mg total) by mouth daily   • metoprolol succinate (TOPROL-XL) 25 mg 24 hr tablet Take 1 tablet (25 mg total) by mouth 2 (two) times a day   • metoprolol tartrate (LOPRESSOR) 100 mg tablet Take 1 tablet (100 mg total) by mouth every 12 (twelve) hours   • rivaroxaban (XARELTO) 15 mg tablet Take 1 tablet (15 mg total) by mouth every evening   • spironolactone (ALDACTONE) 25 mg tablet Take 0 5 tablets (12 5 mg total) by mouth every other day     No Known Allergies  Immunization History   Administered Date(s) Administered   • COVID-19 PFIZER VACCINE 0 3 ML IM 03/10/2021, 03/31/2021   • Tdap 08/22/2021       Objective     There were no vitals taken for this visit      Physical Exam  Tyler Murillo MD

## 2023-02-02 ENCOUNTER — OFFICE VISIT (OUTPATIENT)
Dept: FAMILY MEDICINE CLINIC | Facility: CLINIC | Age: 83
End: 2023-02-02

## 2023-02-02 VITALS
WEIGHT: 99 LBS | HEART RATE: 90 BPM | RESPIRATION RATE: 16 BRPM | TEMPERATURE: 98.1 F | BODY MASS INDEX: 19.96 KG/M2 | HEIGHT: 59 IN | OXYGEN SATURATION: 99 % | DIASTOLIC BLOOD PRESSURE: 80 MMHG | SYSTOLIC BLOOD PRESSURE: 136 MMHG

## 2023-02-02 DIAGNOSIS — M81.0 AGE-RELATED OSTEOPOROSIS WITHOUT CURRENT PATHOLOGICAL FRACTURE: ICD-10-CM

## 2023-02-02 DIAGNOSIS — Z00.00 MEDICARE ANNUAL WELLNESS VISIT, SUBSEQUENT: Primary | ICD-10-CM

## 2023-02-02 DIAGNOSIS — E78.5 HYPERLIPIDEMIA, UNSPECIFIED HYPERLIPIDEMIA TYPE: ICD-10-CM

## 2023-02-02 DIAGNOSIS — Z23 IMMUNIZATION DUE: ICD-10-CM

## 2023-02-02 DIAGNOSIS — I48.91 ATRIAL FIBRILLATION WITH RVR (HCC): ICD-10-CM

## 2023-02-02 DIAGNOSIS — I73.9 PAD (PERIPHERAL ARTERY DISEASE) (HCC): ICD-10-CM

## 2023-02-02 DIAGNOSIS — I10 PRIMARY HYPERTENSION: ICD-10-CM

## 2023-02-02 NOTE — PATIENT INSTRUCTIONS
Medicare Preventive Visit Patient Instructions  Thank you for completing your Welcome to Medicare Visit or Medicare Annual Wellness Visit today  Your next wellness visit will be due in one year (2/3/2024)  The screening/preventive services that you may require over the next 5-10 years are detailed below  Some tests may not apply to you based off risk factors and/or age  Screening tests ordered at today's visit but not completed yet may show as past due  Also, please note that scanned in results may not display below  Preventive Screenings:  Service Recommendations Previous Testing/Comments   Colorectal Cancer Screening  * Colonoscopy    * Fecal Occult Blood Test (FOBT)/Fecal Immunochemical Test (FIT)  * Fecal DNA/Cologuard Test  * Flexible Sigmoidoscopy Age: 39-70 years old   Colonoscopy: every 10 years (may be performed more frequently if at higher risk)  OR  FOBT/FIT: every 1 year  OR  Cologuard: every 3 years  OR  Sigmoidoscopy: every 5 years  Screening may be recommended earlier than age 39 if at higher risk for colorectal cancer  Also, an individualized decision between you and your healthcare provider will decide whether screening between the ages of 74-80 would be appropriate  Colonoscopy: Not on file  FOBT/FIT: Not on file  Cologuard: Not on file  Sigmoidoscopy: Not on file          Breast Cancer Screening Age: 36 years old  Frequency: every 1-2 years  Not required if history of left and right mastectomy Mammogram: Not on file        Cervical Cancer Screening Between the ages of 21-29, pap smear recommended once every 3 years  Between the ages of 33-67, can perform pap smear with HPV co-testing every 5 years     Recommendations may differ for women with a history of total hysterectomy, cervical cancer, or abnormal pap smears in past  Pap Smear: Not on file    Screening Not Indicated   Hepatitis C Screening Once for adults born between [facility]  More frequently in patients at high risk for Hepatitis C Hep C Antibody: Not on file        Diabetes Screening 1-2 times per year if you're at risk for diabetes or have pre-diabetes Fasting glucose: No results in last 5 years (No results in last 5 years)  A1C: No results in last 5 years (No results in last 5 years)  Screening Current   Cholesterol Screening Once every 5 years if you don't have a lipid disorder  May order more often based on risk factors  Lipid panel: 01/10/2023    Screening Not Indicated  History Lipid Disorder     Other Preventive Screenings Covered by Medicare:  1  Abdominal Aortic Aneurysm (AAA) Screening: covered once if your at risk  You're considered to be at risk if you have a family history of AAA  2  Lung Cancer Screening: covers low dose CT scan once per year if you meet all of the following conditions: (1) Age 50-69; (2) No signs or symptoms of lung cancer; (3) Current smoker or have quit smoking within the last 15 years; (4) You have a tobacco smoking history of at least 20 pack years (packs per day multiplied by number of years you smoked); (5) You get a written order from a healthcare provider  3  Glaucoma Screening: covered annually if you're considered high risk: (1) You have diabetes OR (2) Family history of glaucoma OR (3)  aged 48 and older OR (3)  American aged 72 and older  3  Osteoporosis Screening: covered every 2 years if you meet one of the following conditions: (1) You're estrogen deficient and at risk for osteoporosis based off medical history and other findings; (2) Have a vertebral abnormality; (3) On glucocorticoid therapy for more than 3 months; (4) Have primary hyperparathyroidism; (5) On osteoporosis medications and need to assess response to drug therapy  · Last bone density test (DXA Scan): Not on file  5  HIV Screening: covered annually if you're between the age of 12-76  Also covered annually if you are younger than 13 and older than 72 with risk factors for HIV infection   For pregnant patients, it is covered up to 3 times per pregnancy  Immunizations:  Immunization Recommendations   Influenza Vaccine Annual influenza vaccination during flu season is recommended for all persons aged >= 6 months who do not have contraindications   Pneumococcal Vaccine   * Pneumococcal conjugate vaccine = PCV13 (Prevnar 13), PCV15 (Vaxneuvance), PCV20 (Prevnar 20)  * Pneumococcal polysaccharide vaccine = PPSV23 (Pneumovax) Adults 25-60 years old: 1-3 doses may be recommended based on certain risk factors  Adults 72 years old: 1-2 doses may be recommended based off what pneumonia vaccine you previously received   Hepatitis B Vaccine 3 dose series if at intermediate or high risk (ex: diabetes, end stage renal disease, liver disease)   Tetanus (Td) Vaccine - COST NOT COVERED BY MEDICARE PART B Following completion of primary series, a booster dose should be given every 10 years to maintain immunity against tetanus  Td may also be given as tetanus wound prophylaxis  Tdap Vaccine - COST NOT COVERED BY MEDICARE PART B Recommended at least once for all adults  For pregnant patients, recommended with each pregnancy  Shingles Vaccine (Shingrix) - COST NOT COVERED BY MEDICARE PART B  2 shot series recommended in those aged 48 and above     Health Maintenance Due:  There are no preventive care reminders to display for this patient  Immunizations Due:      Topic Date Due   • Pneumococcal Vaccine: 65+ Years (1 - PCV) Never done   • COVID-19 Vaccine (3 - Booster for Pfizer series) 05/26/2021   • Influenza Vaccine (1) Never done     Advance Directives   What are advance directives? Advance directives are legal documents that state your wishes and plans for medical care  These plans are made ahead of time in case you lose your ability to make decisions for yourself  Advance directives can apply to any medical decision, such as the treatments you want, and if you want to donate organs     What are the types of advance directives? There are many types of advance directives, and each state has rules about how to use them  You may choose a combination of any of the following:  · Living will: This is a written record of the treatment you want  You can also choose which treatments you do not want, which to limit, and which to stop at a certain time  This includes surgery, medicine, IV fluid, and tube feedings  · Durable power of  for healthcare [facility]): This is a written record that states who you want to make healthcare choices for you when you are unable to make them for yourself  This person, called a proxy, is usually a family member or a friend  You may choose more than 1 proxy  · Do not resuscitate (DNR) order:  A DNR order is used in case your heart stops beating or you stop breathing  It is a request not to have certain forms of treatment, such as CPR  A DNR order may be included in other types of advance directives  · Medical directive: This covers the care that you want if you are in a coma, near death, or unable to make decisions for yourself  You can list the treatments you want for each condition  Treatment may include pain medicine, surgery, blood transfusions, dialysis, IV or tube feedings, and a ventilator (breathing machine)  · Values history: This document has questions about your views, beliefs, and how you feel and think about life  This information can help others choose the care that you would choose  Why are advance directives important? An advance directive helps you control your care  Although spoken wishes may be used, it is better to have your wishes written down  Spoken wishes can be misunderstood, or not followed  Treatments may be given even if you do not want them  An advance directive may make it easier for your family to make difficult choices about your care         © Copyright [employer] 2018 Information is for End User's use only and may not be sold, redistributed or otherwise used for commercial purposes   All illustrations and images included in CareNotes® are the copyrighted property of A D A M , Inc  or [facility] Ney Khanna

## 2023-02-02 NOTE — PROGRESS NOTES
Assessment and Plan:     Problem List Items Addressed This Visit        Cardiovascular and Mediastinum    Atrial fibrillation with RVR ([facility] )     Continue with anticogulation and rate control of heart rate  Concerns about condition were addressed today  Primary hypertension     Patient is stable with current anti-hypertensive medicine and continue to follow a low sodium diet and take current medication  All questions about this condition were answered today  PAD (peripheral artery disease) ([facility] )     Patient is stable  and will continue present plan of care and reassess at next routine visit  All questions about this problem from patient were answered today  Other    Hyperlipemia     Patient  is stable with current medication and we discussed a low fat low cholesterol diet  Weight loss also discussed for this will help lower cholesterol also  Recheck lipids in 6 months  Other Visit Diagnoses     Well adult exam    -  Primary    Age-related osteoporosis without current pathological fracture               Preventive health issues were discussed with patient, and age appropriate screening tests were ordered as noted in patient's After Visit Summary  Personalized health advice and appropriate referrals for health education or preventive services given if needed, as noted in patient's After Visit Summary  History of Present Illness:     Patient presents for a Medicare Wellness Visit      Patient is here for routine follow-up  To review most recent labs  To also discuss current state of health and any new problems that they may be experiencing  Patient states that medications taken as prescribed and very well tolerated no new complaints at this time  Patient Care Team:  Edwige Deluna MD as PCP - General (Family Medicine)     Review of Systems:     Review of Systems   Constitutional: Negative for appetite change and fever  HENT: Negative for sinus pressure and sore throat  Eyes: Negative for pain  Respiratory: Negative for shortness of breath  Cardiovascular: Negative for chest pain  Gastrointestinal: Negative for abdominal pain  Genitourinary: Negative for dysuria  Musculoskeletal: Negative for arthralgias and myalgias  Skin: Negative for color change  Neurological: Negative for light-headedness  Psychiatric/Behavioral: Negative for behavioral problems  Problem List:     Patient Active Problem List   Diagnosis   • Acute combined systolic and diastolic congestive heart failure (HCC)   • Atrial fibrillation with RVR (HCC)   • JUANITA (acute kidney injury) ([facility] )   • Hyperlipemia   • Primary hypertension   • Mild protein-calorie malnutrition ([facility] )   • PAD (peripheral artery disease) ([facility] )      Past Medical and Surgical History:     Past Medical History:   Diagnosis Date   • Atrial fibrillation ([place] [address] )    • Hypercholesteremia      Past Surgical History:   Procedure Laterality Date   • EMBOLIZATION     • HERNIA REPAIR        Family History:     History reviewed  No pertinent family history  Social History:     Social History     Socioeconomic History   • Marital status:      Spouse name: None   • Number of children: None   • Years of education: None   • Highest education level: None   Occupational History   • None   Tobacco Use   • Smoking status: Former     Types: Cigarettes     Quit date:      Years since quittin 0   • Smokeless tobacco: Never   Vaping Use   • Vaping Use: Never used   Substance and Sexual Activity   • Alcohol use:  Yes     Alcohol/week: 7 0 standard drinks     Types: 7 Standard drinks or equivalent per week     Comment: one glass wine a day   • Drug use: Not Currently   • Sexual activity: None   Other Topics Concern   • None   Social History Narrative   • None     Social Determinants of Health     Financial Resource Strain: Not on file   Food Insecurity: No Food Insecurity   • Worried About Running Out of Food in the Last Year: Never true   • Ran Out of Food in the Last Year: Never true   Transportation Needs: No Transportation Needs   • Lack of Transportation (Medical): No   • Lack of Transportation (Non-Medical): No   Physical Activity: Not on file   Stress: Not on file   Social Connections: Not on file   Intimate Partner Violence: Not on file   Housing Stability: Low Risk    • Unable to Pay for Housing in the Last Year: No   • Number of Places Lived in the Last Year: 1   • Unstable Housing in the Last Year: No      Medications and Allergies:     Current Outpatient Medications   Medication Sig Dispense Refill   • atorvastatin (LIPITOR) 10 mg tablet Take 1 tablet (10 mg total) by mouth daily 90 tablet 1   • digoxin (LANOXIN) 0 125 mg tablet Take 1 tablet (125 mcg total) by mouth 4 (four) times a week Advised patient to take 5 times weekly 21 tablet 5   • furosemide (LASIX) 20 mg tablet Take 1 tablet (20 mg total) by mouth every other day 15 tablet 5   • losartan (COZAAR) 25 mg tablet Take 1 tablet (25 mg total) by mouth daily 30 tablet 5   • metoprolol succinate (TOPROL-XL) 25 mg 24 hr tablet Take 1 tablet (25 mg total) by mouth 2 (two) times a day 180 tablet 1   • metoprolol tartrate (LOPRESSOR) 100 mg tablet Take 1 tablet (100 mg total) by mouth every 12 (twelve) hours 60 tablet 5   • rivaroxaban (XARELTO) 15 mg tablet Take 1 tablet (15 mg total) by mouth every evening 90 tablet 1   • spironolactone (ALDACTONE) 25 mg tablet Take 0 5 tablets (12 5 mg total) by mouth every other day 15 tablet 5     No current facility-administered medications for this visit  No Known Allergies   Immunizations:     Immunization History   Administered Date(s) Administered   • COVID-19 PFIZER VACCINE 0 3 ML IM 03/10/2021, 03/31/2021   • Tdap 08/22/2021      Health Maintenance: There are no preventive care reminders to display for this patient        Topic Date Due   • Pneumococcal Vaccine: 65+ Years (1 - PCV) Never done   • COVID-19 Vaccine (3 - Booster for Pfizer series) 05/26/2021   • Influenza Vaccine (1) Never done      Medicare Screening Tests and Risk Assessments:     Rosa Isela Villar is here for her Subsequent Wellness visit  Health Risk Assessment:   Patient rates overall health as very good  Patient feels that their physical health rating is slightly better  Patient is very satisfied with their life  Eyesight was rated as same  Hearing was rated as same  Patient feels that their emotional and mental health rating is same  Patients states they are never, rarely angry  Patient states they are never, rarely unusually tired/fatigued  Pain experienced in the last 7 days has been none  Patient states that she has experienced no weight loss or gain in last 6 months  Depression Screening:   PHQ-2 Score: 0      Fall Risk Screening: In the past year, patient has experienced: no history of falling in past year      Urinary Incontinence Screening:   Patient has not leaked urine accidently in the last six months  Home Safety:  Patient does not have trouble with stairs inside or outside of their home  Patient has working smoke alarms and has no working carbon monoxide detector  Home safety hazards include: none  Nutrition:   Current diet is Regular  Medications:   Patient is currently taking over-the-counter supplements  OTC medications include: see medication list  Patient is able to manage medications  Activities of Daily Living (ADLs)/Instrumental Activities of Daily Living (IADLs):   Walk and transfer into and out of bed and chair?: Yes  Dress and groom yourself?: Yes    Bathe or shower yourself?: Yes    Feed yourself?  Yes  Do your laundry/housekeeping?: Yes  Manage your money, pay your bills and track your expenses?: Yes  Make your own meals?: Yes    Do your own shopping?: Yes    Previous Hospitalizations:   Any hospitalizations or ED visits within the last 12 months?: Yes    How many hospitalizations have you had in the last year?: 1-2    Advance Care Planning:   Living will: No    Durable POA for healthcare: No    Advanced directive: No    Advanced directive counseling given: No    Five wishes given: No    Patient declined ACP directive: No      Cognitive Screening:   Provider or family/friend/caregiver concerned regarding cognition?: No    PREVENTIVE SCREENINGS      Cardiovascular Screening:    General: Screening Not Indicated and History Lipid Disorder      Diabetes Screening:     General: Screening Current      Cervical Cancer Screening:    General: Screening Not Indicated      Osteoporosis Screening:    General: Screening Not Indicated and History Osteoporosis      Lung Cancer Screening:     General: Screening Not Indicated    Screening, Brief Intervention, and Referral to Treatment (SBIRT)    Screening  Typical number of drinks in a day: 0  Typical number of drinks in a week: 1  Interpretation: Low risk drinking behavior  Single Item Drug Screening:  How often have you used an illegal drug (including marijuana) or a prescription medication for non-medical reasons in the past year? never    Single Item Drug Screen Score: 0  Interpretation: Negative screen for possible drug use disorder    No results found  Physical Exam:     Resp 16   Ht 4' 11" (1 499 m)   Wt 44 9 kg (99 lb)   BMI 20 00 kg/m²     Physical Exam  Vitals and nursing note reviewed  Constitutional:       Appearance: Normal appearance  She is normal weight  HENT:      Head: Normocephalic and atraumatic  Right Ear: Tympanic membrane and ear canal normal       Left Ear: Tympanic membrane and ear canal normal       Nose: Nose normal       Mouth/Throat:      Mouth: Mucous membranes are moist    Eyes:      Pupils: Pupils are equal, round, and reactive to light  Cardiovascular:      Rate and Rhythm: Normal rate and regular rhythm  Pulses: Normal pulses  Heart sounds: Normal heart sounds     Pulmonary:      Effort: Pulmonary effort is normal       Breath sounds: Normal breath sounds  Abdominal:      General: Abdomen is flat  Bowel sounds are normal       Palpations: Abdomen is soft  Skin:     General: Skin is dry  Neurological:      General: No focal deficit present  Mental Status: She is oriented to person, place, and time  Psychiatric:         Mood and Affect: Mood normal          Behavior: Behavior normal          Thought Content:  Thought content normal          Judgment: Judgment normal           SUMA Clarke

## 2023-02-09 ENCOUNTER — OFFICE VISIT (OUTPATIENT)
Dept: CARDIOLOGY CLINIC | Facility: CLINIC | Age: 83
End: 2023-02-09

## 2023-02-09 VITALS
OXYGEN SATURATION: 96 % | SYSTOLIC BLOOD PRESSURE: 146 MMHG | BODY MASS INDEX: 20.36 KG/M2 | HEIGHT: 59 IN | DIASTOLIC BLOOD PRESSURE: 88 MMHG | HEART RATE: 122 BPM | WEIGHT: 101 LBS

## 2023-02-09 DIAGNOSIS — I50.42 CHRONIC COMBINED SYSTOLIC AND DIASTOLIC CONGESTIVE HEART FAILURE (HCC): ICD-10-CM

## 2023-02-09 DIAGNOSIS — I48.20 CHRONIC ATRIAL FIBRILLATION (HCC): ICD-10-CM

## 2023-02-09 DIAGNOSIS — E78.5 HYPERLIPIDEMIA, UNSPECIFIED HYPERLIPIDEMIA TYPE: ICD-10-CM

## 2023-02-09 DIAGNOSIS — N18.9 CHRONIC RENAL IMPAIRMENT, UNSPECIFIED CKD STAGE: ICD-10-CM

## 2023-02-09 DIAGNOSIS — R06.02 SHORTNESS OF BREATH: ICD-10-CM

## 2023-02-09 RX ORDER — METOPROLOL SUCCINATE 25 MG/1
25 TABLET, EXTENDED RELEASE ORAL 3 TIMES DAILY
Qty: 270 TABLET | Refills: 1 | Status: SHIPPED | OUTPATIENT
Start: 2023-02-09

## 2023-02-09 NOTE — PROGRESS NOTES
Progress Note - Cardiology Office  [facility]    Vianey Mitchell 80 y o  female MRN: [id number]  : 1940  Encounter: [id number]      Assessment:     1  Shortness of breath    2  Chronic atrial fibrillation (HCC)    3  Chronic combined systolic and diastolic congestive heart failure ([address] )    4  Chronic renal impairment, unspecified CKD stage    5  Hyperlipidemia, unspecified hyperlipidemia type        Discussion Summary and Plan:    1  Atrial fibrillation with rapid ventricular rate  Patient's heart rate remained faster  She has appeared to be euvolemic  Will increase metoprolol to 275 mg a day in divided doses along with digoxin 0 125 mg 5 times a week and will check digoxin level  Heart rate has improved initial heart rate today around 90 bpm     2  Chronic systolic and diastolic heart failure  She is known to have cardiomyopathy her EF is around 30%  She does not want defibrillator  Hopefully when her heart rate improves her EF will be better  Notes of volume overload  Currently she is taking Aldactone 12 5 mg to alternate with 20 mg Lasix  Her BUN and creatinine slightly elevated  She does not drink enough water  We will cut back Lasix to 10 mg to alternate with 20 mg     3  Exertional shortness of breath  She is feeling much better she has no shortness of breath with normal activities  No evidence of volume overload  Discussed with patient    4  Dyslipidemia  Continue statins    5  CRI  She is on Xarelto 15 mg daily as her weight is only 48 kg  She is also taking Lasix 20 mg every other day along with Aldactone 12 5 every other day and she is on heart failure medication with metoprolol and losartan  She came with her friend  All those issues discussed with them  She has blood test done repeat blood test ordered  He is scheduled to have repeat BMP  Plan      Increase metoprolol XL 25 mg 3 times daily and continue taking metoprolol  mg twice a day as before    Increase digoxin to 0 125 mg 5 days a week    Check electrolytes and digoxin level in about 2-3 weeks    Currently she is taking Lasix 20 mg every other day to alternate with Aldactone 12 5 mg  She can cut back Lasix to 10/20  Continue other cardiac medication as before  Follow-up 6 months  Patient  And her friend was advised and educated to call our office  immediately if  patient has any new symptoms of chest pain/shortness of breath, near-syncope, syncope, light headedness sustained palpitations  or any other cardiovascular symptoms before their scheduled follow-up appointment  Office #[phone]  Please call [phone] if any questions  Counseling :  A description of the counseling  Goals and Barriers  Patient's ability to self care: Yes  Medication side effect reviewed with patient in detail and all their questions answered to their satisfaction  HPI :     Kash Burt is a 80y o  year old female who came for follow up  Patient was in size recently admitted to [facility] with shortness of breath and was known to have chronic systolic and diastolic heart failure and chronic atrial fibrillation  She also has CRI  She used to follow with [facility] and has history of cardiomyopathy difficult to control rate  Her rate was better with beta-blockers and digoxin however she was not taking metoprolol 50 twice a day instead of 100 twice a day  She is also on heart failure medications  She came with her friend  Otherwise she is doing well leg edema has improved and her shortness of breath is much better  Echo Doppler shows her EF is around 30%  11/30/2022  Above reviewed  Patient came follow-up  She was admitted to [facility] with AFib with rapid ventricular rate and chronic systolic and diastolic heart failure  History of chronic atrial fibrillation    She used to follow with [facility] and had history of difficulty control of heart rate  She is known to have EF around 35 40%  Her heart rate is better with beta-blocker and digoxin however she was not taking the full dose  Her blood test from August 2022 reviewed  Her heart rate still remains elevated  Is around 120 beats per minute  She is taking metoprolol 100 twice a day and digoxin twice a week  2/9/2023  Above reviewed  Patient came for follow-up  She has a medical history significant for chronic systolic diastolic heart failure, chronic atrial fibrillation, dyslipidemia who came for follow-up  Her heart rate has been difficult to control when she is in atrial fibrillation  Her EF is around 35 to 40%  She has blood test done on 1/10/2023 which shows her BUN was 29 creatinine 1 2  And her cholesterol profile was acceptable her hemoglobin is stable  She feels well she has no leg edema  She drinks about 1500 cc encouraged her to drink about 1800 cc  Sodium was 138  EKG today shows atrial fibrillation heart rate ranging from 90 to 120 bpm   She does not feel much palpitations  Review of Systems   Constitutional: Negative for activity change, chills, diaphoresis, fever and unexpected weight change  HENT: Negative for congestion  Eyes: Negative for discharge and redness  Respiratory: Negative for cough, chest tightness, shortness of breath and wheezing  Cardiovascular: Negative  Negative for chest pain, palpitations and leg swelling  No leg edema   Gastrointestinal: Negative for abdominal pain, diarrhea and nausea  Endocrine: Negative  Genitourinary: Negative for decreased urine volume and urgency  Musculoskeletal: Negative  Negative for arthralgias, back pain and gait problem  Skin: Negative for rash and wound  Allergic/Immunologic: Negative  Neurological: Negative for dizziness, seizures, syncope, weakness, light-headedness and headaches  Hematological: Negative      Psychiatric/Behavioral: Negative for agitation and confusion  The patient is not nervous/anxious  Historical Information   Past Medical History:   Diagnosis Date   • Atrial fibrillation ([address] )    • Hypercholesteremia      Past Surgical History:   Procedure Laterality Date   • EMBOLIZATION     • HERNIA REPAIR       Social History     Substance and Sexual Activity   Alcohol Use Yes   • Alcohol/week: 7 0 standard drinks   • Types: 7 Standard drinks or equivalent per week    Comment: one glass wine a day     Social History     Substance and Sexual Activity   Drug Use Not Currently     Social History     Tobacco Use   Smoking Status Former   • Types: Cigarettes   • Quit date:    • Years since quittin 1   Smokeless Tobacco Never     Family History: No family history on file      Meds/Allergies     No Known Allergies    Current Outpatient Medications:   •  atorvastatin (LIPITOR) 10 mg tablet, Take 1 tablet (10 mg total) by mouth daily, Disp: 90 tablet, Rfl: 1  •  digoxin (LANOXIN) 0 125 mg tablet, Take 1 tablet (125 mcg total) by mouth 4 (four) times a week Advised patient to take 5 times weekly, Disp: 21 tablet, Rfl: 5  •  furosemide (LASIX) 20 mg tablet, Take 1 tablet (20 mg total) by mouth every other day, Disp: 15 tablet, Rfl: 5  •  losartan (COZAAR) 25 mg tablet, Take 1 tablet (25 mg total) by mouth daily, Disp: 30 tablet, Rfl: 5  •  metoprolol succinate (TOPROL-XL) 25 mg 24 hr tablet, Take 1 tablet (25 mg total) by mouth 3 (three) times a day, Disp: 270 tablet, Rfl: 1  •  metoprolol tartrate (LOPRESSOR) 100 mg tablet, Take 1 tablet (100 mg total) by mouth every 12 (twelve) hours, Disp: 60 tablet, Rfl: 5  •  rivaroxaban (XARELTO) 15 mg tablet, Take 1 tablet (15 mg total) by mouth every evening, Disp: 90 tablet, Rfl: 1  •  spironolactone (ALDACTONE) 25 mg tablet, Take 0 5 tablets (12 5 mg total) by mouth every other day, Disp: 15 tablet, Rfl: 5    Vitals: Blood pressure 146/88, pulse (!) 122, height 4' 11" (1 499 m), weight 45 8 kg (101 lb), SpO2 96 %  ?  Body mass index is 20 4 kg/m²  Wt Readings from Last 3 Encounters:   02/09/23 45 8 kg (101 lb)   02/02/23 44 9 kg (99 lb)   11/30/22 47 2 kg (104 lb)     Vitals:    02/09/23 1257   Weight: 45 8 kg (101 lb)     BP Readings from Last 3 Encounters:   02/09/23 146/88   02/02/23 136/80   11/30/22 140/98         Physical Exam  Constitutional:       General: She is not in acute distress  Appearance: She is well-developed  She is not diaphoretic  Neck:      Thyroid: No thyromegaly  Vascular: No JVD  Cardiovascular:      Rate and Rhythm: Normal rate  Rhythm irregularly irregular  Heart sounds: S1 normal and S2 normal  Heart sounds not distant  Murmur heard  Systolic murmur is present  No friction rub  No gallop  No S3 or S4 sounds  Pulmonary:      Effort: Pulmonary effort is normal  No respiratory distress  Breath sounds: Normal breath sounds  No wheezing or rales  Chest:      Chest wall: No tenderness  Abdominal:      General: There is no distension  Palpations: Abdomen is soft  Tenderness: There is no abdominal tenderness  Musculoskeletal:         General: No deformity  Cervical back: Neck supple  Lymphadenopathy:      Cervical: No cervical adenopathy  Skin:     General: Skin is warm and dry  Coloration: Skin is not pale  Findings: No rash  Neurological:      Mental Status: She is alert and oriented to person, place, and time  Psychiatric:         Judgment: Judgment normal              Diagnostic Studies Review Cardio:  Echo Doppler reviewed  To monitor  Holter monitor done 12/6/2022 shows average heart rate 89 bpm   PACs and PVCs noted no other pauses  EKG:  Twelve lead EKG 08/26/2022 shows with heart rate 115 beats per minute rare PVC noted    Twelve lead EKG done on 11/30/2022 shows atrial fibrillation with heart rate 120 beats per minute    As compared to previous EKG no changes heart remains elevated    Twelve-lead EKG done on 2/9/2023 shows atrial fibrillation with heart rate ranging from 90 - 120 bpm     XR chest 1 view portable    Result Date: 8/14/2022  Narrative: CHEST INDICATION:   swelling  COMPARISON:  Chest radiograph from 11/6/2015  EXAM PERFORMED/VIEWS:  XR CHEST PORTABLE FINDINGS: Cardiomediastinal silhouette appears unremarkable  The lungs are clear  No pneumothorax or pleural effusion  Osseous structures appear within normal limits for patient age  Impression: No acute cardiopulmonary disease  Workstation performed: [id number]     Echo complete w/ contrast if indicated    Result Date: 8/15/2022  Narrative: •  Left Ventricle: Left ventricular cavity size is normal  Wall thickness is normal  Systolic function is severely reduced  Estimated ejection fraction is 25-30%  Wall motion cannot be accurately assessed  Diastolic dysfunction, grade indeterminate due to underlying atrial fibrillation  •  Right Ventricle: Systolic function is reduced  •  Left Atrium: The atrium is severely dilated  •  Right Atrium: The atrium is moderately dilated  •  Mitral Valve: There is annular calcification  There is mild regurgitation  •  Tricuspid Valve: There is mild regurgitation  The right ventricular systolic pressure is mildly elevated           Lab Review   Lab Results   Component Value Date    WBC 5 6 01/10/2023    HGB 14 3 01/10/2023    HCT 41 7 01/10/2023    MCV 91 9 01/10/2023    RDW 12 3 01/10/2023     01/10/2023     BMP:  Lab Results   Component Value Date    SODIUM 138 01/10/2023    K 5 0 01/10/2023    CL 99 01/10/2023    CO2 31 01/10/2023    ANIONGAP 11 7 11/07/2015    BUN 29 (H) 01/10/2023    CREATININE 1 21 (H) 01/10/2023    GLUC 114 (H) 01/10/2023    CALCIUM 9 6 01/10/2023    CORRECTEDCA 9 3 08/15/2022    EGFR 45 (L) 01/10/2023    MG 2 2 01/10/2023     Troponins:    LFT:  Lab Results   Component Value Date    AST 17 01/10/2023    ALT 9 01/10/2023    ALKPHOS 75 01/10/2023    TP 7 5 01/10/2023    ALB 4 3 01/10/2023 Lab Results   Component Value Date    HGBA1C 6 0 (H) 04/27/2016     Lipid Profile:   Lab Results   Component Value Date    CHOLESTEROL 136 01/10/2023    HDL 28 (L) 01/10/2023    LDLCALC 81 01/10/2023    TRIG 178 (H) 01/10/2023     Lab Results   Component Value Date    CHOLESTEROL 136 01/10/2023     Lab Results   Component Value Date    TROPONINI <0 02 11/06/2015     Lab Results   Component Value Date    NTBNP 2,787 (H) 08/14/2022          Dr Olga Cormier MD [facility]      "This note has been constructed using a voice recognition system  Therefore there may be syntax, spelling, and/or grammatical errors   Please call if you have any questions  "

## 2023-02-09 NOTE — PATIENT INSTRUCTIONS
Increase metoprolol XL 25 mg 3 times daily and continue taking metoprolol  mg twice a day as before    Increase digoxin to 0 125 mg 5 days a week    Check electrolytes and digoxin level in about 2-3 weeks    Currently she is taking Lasix 20 mg every other day to alternate with Aldactone 12 5 mg  She can cut back Lasix to 10/20  Continue other cardiac medication as before  Follow-up 6 months

## 2023-02-27 ENCOUNTER — APPOINTMENT (EMERGENCY)
Dept: RADIOLOGY | Facility: HOSPITAL | Age: 83
End: 2023-02-27

## 2023-02-27 ENCOUNTER — HOSPITAL ENCOUNTER (EMERGENCY)
Facility: HOSPITAL | Age: 83
Discharge: HOME | End: 2023-02-27
Attending: EMERGENCY MEDICINE

## 2023-02-27 VITALS
OXYGEN SATURATION: 98 % | TEMPERATURE: 98 F | DIASTOLIC BLOOD PRESSURE: 84 MMHG | SYSTOLIC BLOOD PRESSURE: 182 MMHG | HEART RATE: 130 BPM | RESPIRATION RATE: 20 BRPM

## 2023-02-27 DIAGNOSIS — T14.8XXA HEMATOMA: ICD-10-CM

## 2023-02-27 DIAGNOSIS — S22.089A CLOSED T12 FRACTURE (HCC): ICD-10-CM

## 2023-02-27 DIAGNOSIS — T07.XXXA ABRASIONS OF MULTIPLE SITES: ICD-10-CM
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
## 2023-02-27 LAB
ANION GAP SERPL CALCULATED.3IONS-SCNC: 7 MMOL/L (ref 4–13)
APTT PPP: 27 SECONDS (ref 23–37)
BASOPHILS # BLD AUTO: 0.06 THOUSANDS/ÂΜL (ref 0–0.1)
BASOPHILS NFR BLD AUTO: 1 % (ref 0–1)
BUN SERPL-MCNC: 21 MG/DL (ref 5–25)
CALCIUM SERPL-MCNC: 8.9 MG/DL (ref 8.4–10.2)
CHLORIDE SERPL-SCNC: 101 MMOL/L (ref 96–108)
CO2 SERPL-SCNC: 29 MMOL/L (ref 21–32)
CREAT SERPL-MCNC: 1.07 MG/DL (ref 0.6–1.3)
EOSINOPHIL # BLD AUTO: 0.06 THOUSAND/ÂΜL (ref 0–0.61)
EOSINOPHIL NFR BLD AUTO: 1 % (ref 0–6)
ERYTHROCYTE [DISTWIDTH] IN BLOOD BY AUTOMATED COUNT: 13.1 % (ref 11.6–15.1)
GFR SERPL CREATININE-BSD FRML MDRD: 48 ML/MIN/1.73SQ M
GLUCOSE SERPL-MCNC: 139 MG/DL (ref 65–140)
HCT VFR BLD AUTO: 40 % (ref 34.8–46.1)
HGB BLD-MCNC: 13.4 G/DL (ref 11.5–15.4)
IMM GRANULOCYTES # BLD AUTO: 0.04 THOUSAND/UL (ref 0–0.2)
IMM GRANULOCYTES NFR BLD AUTO: 0 % (ref 0–2)
INR PPP: 1.65 (ref 0.84–1.19)
LYMPHOCYTES # BLD AUTO: 1.11 THOUSANDS/ÂΜL (ref 0.6–4.47)
LYMPHOCYTES NFR BLD AUTO: 10 % (ref 14–44)
MCH RBC QN AUTO: 31.9 PG (ref 26.8–34.3)
MCHC RBC AUTO-ENTMCNC: 33.5 G/DL (ref 31.4–37.4)
MCV RBC AUTO: 95 FL (ref 82–98)
MONOCYTES # BLD AUTO: 0.63 THOUSAND/ÂΜL (ref 0.17–1.22)
MONOCYTES NFR BLD AUTO: 6 % (ref 4–12)
NEUTROPHILS # BLD AUTO: 9.16 THOUSANDS/ÂΜL (ref 1.85–7.62)
NEUTS SEG NFR BLD AUTO: 82 % (ref 43–75)
NRBC BLD AUTO-RTO: 0 /100 WBCS
PLATELET # BLD AUTO: 238 THOUSANDS/UL (ref 149–390)
PMV BLD AUTO: 10.2 FL (ref 8.9–12.7)
POTASSIUM SERPL-SCNC: 4.4 MMOL/L (ref 3.5–5.3)
PROTHROMBIN TIME: 19.6 SECONDS (ref 11.6–14.5)
RBC # BLD AUTO: 4.2 MILLION/UL (ref 3.81–5.12)
SODIUM SERPL-SCNC: 137 MMOL/L (ref 135–147)
WBC # BLD AUTO: 11.06 THOUSAND/UL (ref 4.31–10.16)

## 2023-02-27 RX ORDER — LIDOCAINE 50 MG/G
1 PATCH TOPICAL ONCE
Status: DISCONTINUED | OUTPATIENT
Start: 2023-02-27 | End: 2023-02-27 | Stop reason: HOSPADM

## 2023-02-27 RX ADMIN — IOHEXOL 100 ML: 350 INJECTION, SOLUTION INTRAVENOUS at 13:55

## 2023-02-27 RX ADMIN — LIDOCAINE 5% 1 PATCH: 700 PATCH TOPICAL at 14:52

## 2023-02-27 NOTE — ED PROVIDER NOTES
History  Chief Complaint   Patient presents with   • Fall     States she fell this am going down some steps  May have lost her footing was holding on to a rail with R arm  Denies loc on thinner, large hematoma to R upper arm     60-year-old female presenting today for evaluation of a fall that occurred this morning  States that she was walking down some marble steps when she lost her footing and went to grab the railing with her right hands causing her to twist and subsequently landed onto her left side  She is unsure the exact mechanism of injury  She is on blood thinners, states she did not hit her head nor lose consciousness  Currently denies any type of pain however there is noted to be a large right upper humeral hematoma  States that she did have for short time left lower rib pain however this resolved  Ambulatory without any difficulty  Denies nausea, vomiting, chest or abdominal pain, dizziness, lightheadedness  Differential includes but is not limited to fracture, contusion, bicipital tendon tear etc           Prior to Admission Medications   Prescriptions Last Dose Informant Patient Reported?  Taking?   atorvastatin (LIPITOR) 10 mg tablet   No No   Sig: Take 1 tablet (10 mg total) by mouth daily   digoxin (LANOXIN) 0 125 mg tablet   No No   Sig: Take 1 tablet (125 mcg total) by mouth 4 (four) times a week Advised patient to take 5 times weekly   furosemide (LASIX) 20 mg tablet   No No   Sig: Take 1 tablet (20 mg total) by mouth every other day   losartan (COZAAR) 25 mg tablet   No No   Sig: Take 1 tablet (25 mg total) by mouth daily   metoprolol succinate (TOPROL-XL) 25 mg 24 hr tablet   No No   Sig: Take 1 tablet (25 mg total) by mouth 3 (three) times a day   metoprolol tartrate (LOPRESSOR) 100 mg tablet   No No   Sig: Take 1 tablet (100 mg total) by mouth every 12 (twelve) hours   rivaroxaban (XARELTO) 15 mg tablet   No No   Sig: Take 1 tablet (15 mg total) by mouth every evening   spironolactone (ALDACTONE) 25 mg tablet   No No   Sig: Take 0 5 tablets (12 5 mg total) by mouth every other day      Facility-Administered Medications: None       Past Medical History:   Diagnosis Date   • Atrial fibrillation ([address] )    • Hypercholesteremia        Past Surgical History:   Procedure Laterality Date   • EMBOLIZATION     • HERNIA REPAIR         History reviewed  No pertinent family history  I have reviewed and agree with the history as documented  E-Cigarette/Vaping   • E-Cigarette Use Never User      E-Cigarette/Vaping Substances   • Nicotine No    • THC No    • CBD No    • Flavoring No    • Other No    • Unknown No      Social History     Tobacco Use   • Smoking status: Former     Types: Cigarettes     Quit date:      Years since quittin 1   • Smokeless tobacco: Never   Vaping Use   • Vaping Use: Never used   Substance Use Topics   • Alcohol use: Yes     Alcohol/week: 7 0 standard drinks     Types: 7 Standard drinks or equivalent per week     Comment: one glass wine a day   • Drug use: Not Currently       Review of Systems   Constitutional: Negative  Negative for chills, fatigue and fever  HENT: Negative  Negative for congestion, postnasal drip, rhinorrhea and sore throat  Eyes: Negative  Respiratory: Negative  Negative for cough, shortness of breath and wheezing  Cardiovascular: Negative  Gastrointestinal: Negative  Negative for abdominal pain, diarrhea, nausea and vomiting  Endocrine: Negative  Genitourinary: Negative  Musculoskeletal: Negative  Skin: Positive for color change and wound  Negative for pallor and rash  Neurological: Negative  Hematological: Negative  Psychiatric/Behavioral: Negative  All other systems reviewed and are negative  Physical Exam  Physical Exam  Vitals and nursing note reviewed  Constitutional:       General: She is not in acute distress  Appearance: She is well-developed  She is not diaphoretic     HENT:      Head: Normocephalic and atraumatic  Right Ear: External ear normal       Left Ear: External ear normal       Nose: Nose normal       Mouth/Throat:      Pharynx: No oropharyngeal exudate  Eyes:      General: No scleral icterus  Right eye: No discharge  Left eye: No discharge  Conjunctiva/sclera: Conjunctivae normal       Pupils: Pupils are equal, round, and reactive to light  Cardiovascular:      Rate and Rhythm: Regular rhythm  Tachycardia present  Pulses: Normal pulses  Heart sounds: Normal heart sounds  No murmur heard  No friction rub  No gallop  Pulmonary:      Effort: Pulmonary effort is normal  No respiratory distress  Breath sounds: Normal breath sounds  No stridor  No wheezing, rhonchi or rales  Comments: spo2 is 95% indicating adequate oxygenation   Chest:      Chest wall: No tenderness  Abdominal:      General: Abdomen is flat  Bowel sounds are normal  There is no distension  Palpations: Abdomen is soft  There is no mass  Tenderness: There is no abdominal tenderness  There is no guarding or rebound  Hernia: No hernia is present  Musculoskeletal:      Cervical back: Normal range of motion and neck supple  Lymphadenopathy:      Cervical: No cervical adenopathy  Skin:     General: Skin is warm and dry  Capillary Refill: Capillary refill takes less than 2 seconds  Coloration: Skin is not pale  Findings: Bruising present  No erythema or rash  Neurological:      General: No focal deficit present  Mental Status: She is alert and oriented to person, place, and time  Mental status is at baseline               Vital Signs  ED Triage Vitals [02/27/23 1220]   Temperature Pulse Respirations Blood Pressure SpO2   98 °F (36 7 °C) (!) 114 22 (!) 193/114 95 %      Temp Source Heart Rate Source Patient Position - Orthostatic VS BP Location FiO2 (%)   Tympanic Monitor Sitting Left arm --      Pain Score       No Pain Vitals:    02/27/23 1300 02/27/23 1415 02/27/23 1500 02/27/23 1545   BP: (!) 189/100 (!) 188/119 (!) 144/124 (!) 182/84   Pulse: (!) 106 66 102 (!) 106   Patient Position - Orthostatic VS:             Visual Acuity      ED Medications  Medications   lidocaine (LIDODERM) 5 % patch 1 patch (1 patch Topical Medication Applied 2/27/23 1452)   iohexol (OMNIPAQUE) 350 MG/ML injection (SINGLE-DOSE) 100 mL (100 mL Intravenous Given 2/27/23 1355)       Diagnostic Studies  Results Reviewed     Procedure Component Value Units Date/Time    Protime-INR [[id number]]  (Abnormal) Collected: 02/27/23 1253    Lab Status: Final result Specimen: Blood from Arm, Left Updated: 02/27/23 1319     Protime 19 6 seconds      INR 1 65    APTT [[id number]]  (Normal) Collected: 02/27/23 1253    Lab Status: Final result Specimen: Blood from Arm, Left Updated: 02/27/23 1319     PTT 27 seconds     Basic metabolic panel [[id number]] Collected: 02/27/23 1253    Lab Status: Final result Specimen: Blood from Arm, Left Updated: 02/27/23 1315     Sodium 137 mmol/L      Potassium 4 4 mmol/L      Chloride 101 mmol/L      CO2 29 mmol/L      ANION GAP 7 mmol/L      BUN 21 mg/dL      Creatinine 1 07 mg/dL      Glucose 139 mg/dL      Calcium 8 9 mg/dL      eGFR 48 ml/min/1 73sq m     Narrative:      [place] guidelines for Chronic Kidney Disease (CKD):   •  Stage 1 with normal or high GFR (GFR > 90 mL/min/1 73 square meters)  •  Stage 2 Mild CKD (GFR = 60-89 mL/min/1 73 square meters)  •  Stage 3A Moderate CKD (GFR = 45-59 mL/min/1 73 square meters)  •  Stage 3B Moderate CKD (GFR = 30-44 mL/min/1 73 square meters)  •  Stage 4 Severe CKD (GFR = 15-29 mL/min/1 73 square meters)  •  Stage 5 End Stage CKD (GFR <15 mL/min/1 73 square meters)  Note: GFR calculation is accurate only with a steady state creatinine    CBC and differential [[id number]]  (Abnormal) Collected: 02/27/23 7015    Lab Status: Final result Specimen: Blood from Arm, Left Updated: 02/27/23 1258     WBC 11 06 Thousand/uL      RBC 4 20 Million/uL      Hemoglobin 13 4 g/dL      Hematocrit 40 0 %      MCV 95 fL      MCH 31 9 pg      MCHC 33 5 g/dL      RDW 13 1 %      MPV 10 2 fL      Platelets 130 Thousands/uL      nRBC 0 /100 WBCs      Neutrophils Relative 82 %      Immat GRANS % 0 %      Lymphocytes Relative 10 %      Monocytes Relative 6 %      Eosinophils Relative 1 %      Basophils Relative 1 %      Neutrophils Absolute 9 16 Thousands/µL      Immature Grans Absolute 0 04 Thousand/uL      Lymphocytes Absolute 1 11 Thousands/µL      Monocytes Absolute 0 63 Thousand/µL      Eosinophils Absolute 0 06 Thousand/µL      Basophils Absolute 0 06 Thousands/µL                  CT chest abdomen pelvis w contrast   Final Result by Ousmane White DO (02/27 9413)   ACUTE Horizontal fracture through the T12 vertebral body with slight diastases of fragments  There is surrounding soft tissue edema  No retropulsion  Fracture lines not appear to propagate into the posterior elements  I personally discussed this with [facility] on 2/27/2023 at 3:49 PM       Additionally:   Age-indeterminate likely old partial compression fractures of T7 and T8  Advanced degenerative changes left hip  Probable old injuries right 4th rib and left inferior pubic ramus  Mild cardiomegaly due to biatrial enlargement  Diffusely enlarged heterogeneous thyroid gland with nodules measure 2 cm on the right  Follow-up nonemergent thyroid ultrasound advised  Bilateral renal cysts grossly stable  Stable uterine fibroids  Workstation performed: [id number]         CT head without contrast   Final Result by Ousmane White DO (02/27 6416)   No acute intracranial abnormality  Workstation performed: [id number]         XR humerus RIGHT   ED Interpretation by Merced Nye PA-C (02/27 5175)   No acute osseous abnormality  Procedures  Procedures         ED Course  ED Course as of 02/27/23 1618   Mon Feb 27, 2023   1455 Patient updated    [phone] Calling reading room they are looking at the CT now   MALLORIE Daniel 23 T 12 fx    8204 Discussed with Dr Prosper Trimble, patient eager to be discharged pending poor weather                                SBIRT 22yo+    Flowsheet Row Most Recent Value   SBIRT (23 yo +)    In order to provide better care to our patients, we are screening all of our patients for alcohol and drug use  Would it be okay to ask you these screening questions? No Filed at: 02/27/2023 1232                    Medical Decision Making  Thorough conversation with patient regarding imaging including newly found T12 fracture patient is neurologically intact, comfortable on exam   Advised Tylenol and lidocaine patches  Patient has chronic compression fractures  Patient has large right upper extremity hematoma, please refer to picture uploaded to patient's chart, she is neurologically intact, less likely bicep tendon tear as patient has full ROM and strength of all joints  Patient seen and case discussed with Dr Prosper Trimble  Compressive ace bandage placed to the RUE with good neurovascular exam before and after  Given thorough education regarding hematomas and that this will take weeks to improve, advised compression and icing of the area  She is given strict return precautions for worsening including but not limited to severe pain, numbness of extremity, etc  otherwise spinal comprehensive referral was placed for the patient regarding her T12 fracture  Chronically low GFR at 48  Patient ambulatory in the ED  Appears very well  Minimal back pain  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  Patient is informed to return to the emergency department for worsening of symptoms and was given proper education regarding their diagnosis and symptoms   Otherwise the patient is informed to follow up with their primary care doctor for re-evaluation  The patient verbalizes understanding and agrees with above assessment and plan  All questions were answered  Abrasions of multiple sites: acute illness or injury  Closed T12 fracture [facility]): acute illness or injury     Details: placed spinal comprehensive referral    Fall, initial encounter: acute illness or injury  Hematoma: acute illness or injury  Amount and/or Complexity of Data Reviewed  Labs: ordered  Radiology: ordered and independent interpretation performed  Risk  Prescription drug management  Disposition  Final diagnoses:   Fall, initial encounter   Hematoma   Abrasions of multiple sites   Closed T12 fracture ([address] )     Time reflects when diagnosis was documented in both MDM as applicable and the Disposition within this note     Time User Action Codes Description Comment    2/27/2023  4:12 PM Caity [address], [address] [O25  GBNB] Fall, initial encounter     2/27/2023  4:13 PM Maren Lockett  8XXA] Hematoma     2/27/2023  4:13 PM Tracey Peters Add [T07  XXXA] Abrasions of multiple sites     2/27/2023  4:13 PM Tracey Peters Add [X18 249S] Closed T12 fracture [facility])       ED Disposition     ED Disposition   Discharge    Condition   Stable    Date/Time   Mon Feb 27, 2023  4:12 PM    Comment   Jefferson Valdovinos discharge to home/self care                 Follow-up Information     Follow up With Specialties Details Why Contact Info Additional [address] Emergency Department Emergency Medicine Go to  If symptoms worsen such as severe pain, numbness of extremity etc, otherwise please follow up with your family doctor [address] [place] [id number] [address] [address] Emergency Department, [facility], [address] [facility], [id number]          Patient's Medications   Discharge Prescriptions    No medications on file           PDMP Review     None          ED Provider  Electronically Signed by           Carol Massey PA-C  02/27/23 457

## 2023-02-27 NOTE — DISCHARGE INSTRUCTIONS
Please apply compression to the right upper arm with the  ace wrap as discussed and apply ice to the area as well

## 2023-02-28 ENCOUNTER — NURSE TRIAGE (OUTPATIENT)
Dept: PHYSICAL THERAPY | Facility: OTHER | Age: 83
End: 2023-02-28

## 2023-02-28 DIAGNOSIS — M54.9 MID BACK PAIN: ICD-10-CM

## 2023-02-28 DIAGNOSIS — M54.50 ACUTE MIDLINE LOW BACK PAIN WITHOUT SCIATICA: Primary | ICD-10-CM

## 2023-02-28 NOTE — TELEPHONE ENCOUNTER
Additional Information  • Negative: Is this related to a work injury? • Negative: Is this related to an MVA? • Negative: Are you currently recieving homecare services? • Negative: Has the patient had unexplained weight loss? • Negative: Does the patient have a fever? • Negative: Is the patient experiencing blood in sputum? • Negative: Has the patient experienced major trauma? (fall from height, high speed collision, direct blow to spine) and is also experiencing nausea, light-headedness, or loss of consciousness? • Negative: Is the patient experiencing urine retention? • Negative: Is the patient experiencing acute drop foot or paralysis? • Negative: Is this a chronic condition? Background - Initial Assessment  Clinical complaint: Midline low to mid back pain  Date of onset: Pain started yesterday after a fall  Frequency of pain: intermittent  Quality of pain: dull ache    Protocols used: [facility] COMPREHENSIVE SPINE PROGRAM PROTOCOL    Patient seen in ED yesterday post fall down stairs  PLEASE SEE NOTES FOR THAT ENCOUNTER  Referral to [facility] DX- Acute T12 closed fx  Age indeterminate partial CF noted in thoracic area as well  This RN did review the Comprehensive Spine Program in detail and recent ED visit  Nurse explained the protocol for ACUTE Fractures with her  Patient is aware of imaging results and home self-care instructions  This nurse reminded/recommended she reach out to her Primary as well to see if an ED f/u appointment is warranted  This is also on pts  AVS   Patient is agreeable to [facility] triage and would like to proceed w/ Evaluation/Consult with Ortho Care Specialist-Dr Manuel Grissom  Triaged and NO RF s/s present  Patient made aware clerical would be calling to schedule appointment  Contact information for Dr Brittani Krishna office/Orthopedic Surgery was provided for the patient as well  Reminded patient she would discuss any necessary interventions at the consult with Ortho   Patient to place  F/u call if needed  Patient did not voice any questions and/or concerns  All information about patients intended careplan reviewed and the patient is in agreement with this RN's explaination of recommendations/referral(s)  Patient appreciative of call and referral placement  Nurse wished patient well and CSP referral closed per protocol

## 2023-03-06 ENCOUNTER — OFFICE VISIT (OUTPATIENT)
Dept: OBGYN CLINIC | Facility: HOSPITAL | Age: 83
End: 2023-03-06

## 2023-03-06 ENCOUNTER — HOSPITAL ENCOUNTER (OUTPATIENT)
Dept: RADIOLOGY | Facility: HOSPITAL | Age: 83
Discharge: HOME | End: 2023-03-06
Attending: ORTHOPAEDIC SURGERY

## 2023-03-06 VITALS
HEART RATE: 109 BPM | HEIGHT: 59 IN | DIASTOLIC BLOOD PRESSURE: 91 MMHG | WEIGHT: 100.97 LBS | BODY MASS INDEX: 20.36 KG/M2 | SYSTOLIC BLOOD PRESSURE: 165 MMHG

## 2023-03-06 DIAGNOSIS — M54.50 ACUTE MIDLINE LOW BACK PAIN WITHOUT SCIATICA: ICD-10-CM

## 2023-03-06 DIAGNOSIS — M54.9 MID BACK PAIN: ICD-10-CM

## 2023-03-06 DIAGNOSIS — S22.000A CLOSED WEDGE FRACTURE OF THORACIC VERTEBRA, UNSPECIFIED THORACIC VERTEBRAL LEVEL, INITIAL ENCOUNTER (HCC): ICD-10-CM

## 2023-03-06 DIAGNOSIS — R52 PAIN: Primary | ICD-10-CM

## 2023-03-06 DIAGNOSIS — R52 PAIN: ICD-10-CM

## 2023-03-06 NOTE — PROGRESS NOTES
NAME: Brennan Cordova  : 1940  PCP: Imani Awad MD    Chief Complaint: Low back pain    HPI:  Brennan Cordova is a 80 y o  female presenting for initial visit with chief complaint of low back pain  Patient notes she had a fall from standing about 1 week ago  She was evaluated in the emergency department  CT scan identified T12 vertebral body fracture  She was discharged home  At today's visit she has minimal back pain  She has been ambulating at her baseline  She uses a walker  She is accompanied by her neighbor Aysha Cosby  She lives at home alone and is able to care for herself  She is not been taking many medications for this pain  She denies any numbness or tingling in her bilateral lower extremities  Denies fever or chills  Denies any bladder or bowel changes  FAMILY HISTORY   History reviewed  No pertinent family history      PAST MEDICAL HISTORY:   Past Medical History:   Diagnosis Date   • Atrial fibrillation (HCC)    • Hypercholesteremia        MEDICATIONS:  Current Outpatient Medications   Medication Sig Dispense Refill   • atorvastatin (LIPITOR) 10 mg tablet Take 1 tablet (10 mg total) by mouth daily 90 tablet 1   • digoxin (LANOXIN) 0 125 mg tablet Take 1 tablet (125 mcg total) by mouth 4 (four) times a week Advised patient to take 5 times weekly 21 tablet 5   • furosemide (LASIX) 20 mg tablet Take 1 tablet (20 mg total) by mouth every other day 15 tablet 5   • losartan (COZAAR) 25 mg tablet Take 1 tablet (25 mg total) by mouth daily 30 tablet 5   • metoprolol succinate (TOPROL-XL) 25 mg 24 hr tablet Take 1 tablet (25 mg total) by mouth 3 (three) times a day 270 tablet 1   • rivaroxaban (XARELTO) 15 mg tablet Take 1 tablet (15 mg total) by mouth every evening 90 tablet 1   • metoprolol tartrate (LOPRESSOR) 100 mg tablet Take 1 tablet (100 mg total) by mouth every 12 (twelve) hours 60 tablet 5   • spironolactone (ALDACTONE) 25 mg tablet Take 0 5 tablets (12 5 mg total) by mouth every other day 15 tablet 5     No current facility-administered medications for this visit  PAST SURGICAL HISTORY:  Past Surgical History:   Procedure Laterality Date   • EMBOLIZATION     • HERNIA REPAIR         SOCIAL HISTORY:  Social History     Socioeconomic History   • Marital status:      Spouse name: Not on file   • Number of children: Not on file   • Years of education: Not on file   • Highest education level: Not on file   Occupational History   • Not on file   Tobacco Use   • Smoking status: Former     Types: Cigarettes     Quit date:      Years since quittin 1   • Smokeless tobacco: Never   Vaping Use   • Vaping Use: Never used   Substance and Sexual Activity   • Alcohol use: Yes     Alcohol/week: 7 0 standard drinks     Types: 7 Standard drinks or equivalent per week     Comment: one glass wine a day   • Drug use: Not Currently   • Sexual activity: Not on file   Other Topics Concern   • Not on file   Social History Narrative   • Not on file     Social Determinants of Health     Financial Resource Strain: Low Risk    • Difficulty of Paying Living Expenses: Not hard at all   Food Insecurity: No Food Insecurity   • Worried About Running Out of Food in the Last Year: Never true   • Ran Out of Food in the Last Year: Never true   Transportation Needs: No Transportation Needs   • Lack of Transportation (Medical): No   • Lack of Transportation (Non-Medical):  No   Physical Activity: Not on file   Stress: Not on file   Social Connections: Not on file   Intimate Partner Violence: Not on file   Housing Stability: Low Risk    • Unable to Pay for Housing in the Last Year: No   • Number of Places Lived in the Last Year: 1   • Unstable Housing in the Last Year: No       ALLERGIES:  No Known Allergies    ROS:   Constitutional:  No fever, chills, night sweats, loss of appetite   HEENT No no sore throat, difficulty swallowing   Cardiovascular:  No chest pain, palpitations, BLE edema, LANGE Respiratory:  No SOB, coughing, chest congestion   Gastrointestinal:  No nausea, vomiting, abdominal pain   Genitourinary:  No dysuria, hematuria, urinary urgency/frequency   Musculoskeletal:  See HPI   Skin:  No rash, erythema, edema   Neurologic:  See HPI   Psychiatric Illness:  No depression, anxiety, mood disorder, substance abuse disorder     PHYSICAL EXAM:  /91   Pulse (!) 109   Ht 4' 11" (1 499 m)   Wt 45 8 kg (100 lb 15 5 oz)   BMI 20 39 kg/m²           General:  Well-developed,appears well, no acute distress   Respiratory:  No SOB, no abnormal effort (use of accessory muscles)  GI / Abdominal:  Soft  No abdominal masses or tenderness  Nondistended  Gait & balance No evidence of myelopathic gait  Lumbar spine range of motion:  Lumbar spine range of motion not assessed due to known fracture    There is no point tenderness with palpation along the posterior cervical, thoracic, lumbar spine  Only mild thoracolumbar paraspinal tenderness    Neurologic:    Lower Extremity Motor Function    Right  Left    Iliopsoas  5/5  5/5    Quadriceps 5/5 5/5   Tibialis anterior  5/5  5/5    EHL  5/5  5/5    Gastroc  muscle  5/5  5/5    Heel rise  5/5  5/5    Toe rise  5/5  5/5      Sensory: light touch is intact to bilateral upper and lower extremities     Reflexes:    Right Left   Patellar 1+ 1+   Achilles 1+ 1+   Babinski neg neg       IMAGING: I have personally reviewed the images and these are my findings:  CT from 2/27/2023 and thoracic x-ray from 3/6/2023: T12 vertebral body fracture with axial plane fracture propagation, with extension into right pedicle, no significant bony retropulsion, no obvious instability on upright right radiographs, no splaying of the spinous processes, there is overall increased thoracic kyphosis with osteoporotic appearing bone density        ASSESSMENT / Medical Decision Making (MDM):  80 y o  female with history of low back pain, T12 vertebral body fracture    No incontinence of bowel/bladder, no fever, no chills, no night sweats  Physical exam showing no focal neurologic deficits    Imaging reviewed as above  Findings most notable for T12 vertebral body fracture    Impression of T12 vertebral body fracture    Plan: The above clinical, physical and imaging findings were reviewed with the patient  Murvin Schwab  has a constellation of findings consistent with resolving low back pain in the setting of a fall with findings of a T12 vertebral body fracture  Murvin Schwab notes that she had initial pain after her fall but has steadily improved  At today's visit she has minimal pain, almost no tenderness and is ambulating at her baseline with a walker  Upright radiographs do not demonstrate any obvious instability  She is neurologically intact  At this time will treat conservatively with TLSO brace  Patient instructed to wear the brace when sitting (>45 degrees), standing, and walking  Patient should not have brace on when driving or sleeping  Discussed purpose of brace, proper usage, and brace care  Limit bending, twisting, heavy lifting over the next few weeks to facilitate healing of compression fractures  We will have patient follow-up in 1 month for repeat radiographs  Patient instructed to return to office/ER sooner if symptoms are not improving, getting worse, or new worrisome/neurologic symptoms arise

## 2023-03-13 ENCOUNTER — TELEPHONE (OUTPATIENT)
Dept: CARDIOLOGY CLINIC | Facility: CLINIC | Age: 83
End: 2023-03-13

## 2023-03-13 DIAGNOSIS — I50.41 ACUTE COMBINED SYSTOLIC AND DIASTOLIC CONGESTIVE HEART FAILURE (HCC): ICD-10-CM

## 2023-03-13 RX ORDER — LOSARTAN POTASSIUM 25 MG/1
25 TABLET ORAL DAILY
Qty: 30 TABLET | Refills: 5 | Status: SHIPPED | OUTPATIENT
Start: 2023-03-13

## 2023-04-03 ENCOUNTER — OFFICE VISIT (OUTPATIENT)
Dept: OBGYN CLINIC | Facility: HOSPITAL | Age: 83
End: 2023-04-03

## 2023-04-03 ENCOUNTER — HOSPITAL ENCOUNTER (OUTPATIENT)
Dept: RADIOLOGY | Facility: HOSPITAL | Age: 83
Discharge: HOME | End: 2023-04-03
Attending: ORTHOPAEDIC SURGERY

## 2023-04-03 ENCOUNTER — TELEPHONE (OUTPATIENT)
Dept: CARDIOLOGY CLINIC | Facility: CLINIC | Age: 83
End: 2023-04-03

## 2023-04-03 VITALS
DIASTOLIC BLOOD PRESSURE: 106 MMHG | WEIGHT: 100.97 LBS | HEART RATE: 106 BPM | BODY MASS INDEX: 20.36 KG/M2 | HEIGHT: 59 IN | SYSTOLIC BLOOD PRESSURE: 158 MMHG

## 2023-04-03 DIAGNOSIS — S22.000A CLOSED WEDGE FRACTURE OF THORACIC VERTEBRA, UNSPECIFIED THORACIC VERTEBRAL LEVEL, INITIAL ENCOUNTER (HCC): Primary | ICD-10-CM

## 2023-04-03 DIAGNOSIS — S22.000A CLOSED WEDGE FRACTURE OF THORACIC VERTEBRA, UNSPECIFIED THORACIC VERTEBRAL LEVEL, INITIAL ENCOUNTER (HCC): ICD-10-CM

## 2023-04-03 DIAGNOSIS — I48.91 ATRIAL FIBRILLATION WITH RVR (HCC): ICD-10-CM

## 2023-04-03 RX ORDER — METOPROLOL TARTRATE 100 MG/1
100 TABLET ORAL EVERY 12 HOURS SCHEDULED
Qty: 180 TABLET | Refills: 3 | Status: SHIPPED | OUTPATIENT
Start: 2023-04-03 | End: 2023-05-03

## 2023-04-03 NOTE — PROGRESS NOTES
NAME: Enedina Macias  : 1940  PCP: Ralph Qiu MD    Chief Complaint: low back pain - FOLLOW UP, T12 vertebral fracture    HPI:  Enedina Macias is a 80 y o  female presenting for initial visit with chief complaint of low back pain  Patient notes she had a fall from standing about 1 week ago  She was evaluated in the emergency department  CT scan identified T12 vertebral body fracture  She was discharged home  At today's visit she has minimal back pain  She has been ambulating at her baseline  She uses a walker  She is accompanied by her neighbor Ceci Burkett  She lives at home alone and is able to care for herself  She is not been taking many medications for this pain  She denies any numbness or tingling in her bilateral lower extremities  Denies fever or chills  Denies any bladder or bowel changes  Update from 4/3/2023:   Terri Man is here for follow up, thoracic vertebral fracture  Terri Man notes she is feeling well  She has been able to resume her usual activities without limitation  She is no longer wearing TLS brace  Has cane for ambulation as needed  Has been able to ambulate without difficulty  Denies any current back pain  Denies any leg pain, numbness/tingling or weakness  Not currently taking medications for the pain  FAMILY HISTORY   History reviewed  No pertinent family history      PAST MEDICAL HISTORY:   Past Medical History:   Diagnosis Date   • Atrial fibrillation (HCC)    • Hypercholesteremia        MEDICATIONS:  Current Outpatient Medications   Medication Sig Dispense Refill   • atorvastatin (LIPITOR) 10 mg tablet Take 1 tablet (10 mg total) by mouth daily 90 tablet 1   • digoxin (LANOXIN) 0 125 mg tablet Take 1 tablet (125 mcg total) by mouth 4 (four) times a week Advised patient to take 5 times weekly 21 tablet 5   • furosemide (LASIX) 20 mg tablet Take 1 tablet (20 mg total) by mouth every other day 15 tablet 5   • losartan (COZAAR) 25 mg tablet Take 1 tablet (25 mg total) by mouth daily 30 tablet 5   • metoprolol succinate (TOPROL-XL) 25 mg 24 hr tablet Take 1 tablet (25 mg total) by mouth 3 (three) times a day 270 tablet 1   • metoprolol tartrate (LOPRESSOR) 100 mg tablet Take 1 tablet (100 mg total) by mouth every 12 (twelve) hours 60 tablet 5   • rivaroxaban (XARELTO) 15 mg tablet Take 1 tablet (15 mg total) by mouth every evening 90 tablet 1   • spironolactone (ALDACTONE) 25 mg tablet Take 0 5 tablets (12 5 mg total) by mouth every other day 15 tablet 5     No current facility-administered medications for this visit  PAST SURGICAL HISTORY:  Past Surgical History:   Procedure Laterality Date   • EMBOLIZATION     • HERNIA REPAIR         SOCIAL HISTORY:  Social History     Socioeconomic History   • Marital status:      Spouse name: Not on file   • Number of children: Not on file   • Years of education: Not on file   • Highest education level: Not on file   Occupational History   • Not on file   Tobacco Use   • Smoking status: Former     Types: Cigarettes     Quit date:      Years since quittin 2   • Smokeless tobacco: Never   Vaping Use   • Vaping Use: Never used   Substance and Sexual Activity   • Alcohol use: Yes     Alcohol/week: 7 0 standard drinks     Types: 7 Standard drinks or equivalent per week     Comment: one glass wine a day   • Drug use: Not Currently   • Sexual activity: Not on file   Other Topics Concern   • Not on file   Social History Narrative   • Not on file     Social Determinants of Health     Financial Resource Strain: Low Risk    • Difficulty of Paying Living Expenses: Not hard at all   Food Insecurity: No Food Insecurity   • Worried About Running Out of Food in the Last Year: Never true   • Ran Out of Food in the Last Year: Never true   Transportation Needs: No Transportation Needs   • Lack of Transportation (Medical): No   • Lack of Transportation (Non-Medical):  No   Physical Activity: Not on file   Stress: Not on file   Social "Connections: Not on file   Intimate Partner Violence: Not on file   Housing Stability: Low Risk    • Unable to Pay for Housing in the Last Year: No   • Number of Places Lived in the Last Year: 1   • Unstable Housing in the Last Year: No       ALLERGIES:  No Known Allergies    ROS:   Constitutional:  No fever, chills, night sweats, loss of appetite   HEENT No no sore throat, difficulty swallowing   Cardiovascular:  No chest pain, palpitations, BLE edema, LANGE   Respiratory:  No SOB, coughing, chest congestion   Gastrointestinal:  No nausea, vomiting, abdominal pain   Genitourinary:  No dysuria, hematuria, urinary urgency/frequency   Musculoskeletal:  See HPI   Skin:  No rash, erythema, edema   Neurologic:  See HPI   Psychiatric Illness:  No depression, anxiety, mood disorder, substance abuse disorder     PHYSICAL EXAM:  BP (!) 158/106 Comment: pt states BP is high because of walk down hallway  Pulse (!) 106   Ht 4' 11\" (1 499 m)   Wt 45 8 kg (100 lb 15 5 oz)   BMI 20 39 kg/m²           General:  Well-developed,appears well, no acute distress   Respiratory:  No SOB, no abnormal effort (use of accessory muscles)  GI / Abdominal:  Soft  No abdominal masses or tenderness  Nondistended  Gait & balance No evidence of myelopathic gait  Lumbar spine range of motion:  Lumbar spine range of motion not assessed due to known fracture    There is no point tenderness with palpation along the posterior cervical, thoracic, lumbar spine     Only mild thoracolumbar paraspinal tenderness    Neurologic:    Lower Extremity Motor Function    Right  Left    Iliopsoas  5/5  5/5    Quadriceps 5/5 5/5   Tibialis anterior  5/5  5/5    EHL  5/5  5/5    Gastroc  muscle  5/5  5/5    Heel rise  5/5  5/5    Toe rise  5/5  5/5      Sensory: light touch is intact to bilateral upper and lower extremities     Reflexes:    Right Left   Patellar 1+ 1+   Achilles 1+ 1+   Babinski neg neg     IMAGING: I have personally reviewed the images and " these are my findings:  Thoracic spine xrays from 4/3/2023: T7, T8 and T12 compression deformities, increased thoracic kyphosis, osteoporotic appearing bone quality, stable appearing compared to 3/6/23 imaging       CT from 2/27/2023 and thoracic x-ray from 3/6/2023: T12 vertebral body fracture with axial plane fracture propagation, with extension into right pedicle, no significant bony retropulsion, no obvious instability on upright right radiographs, no splaying of the spinous processes, there is overall increased thoracic kyphosis with osteoporotic appearing bone density      ASSESSMENT / Medical Decision Making (MDM):  80 y o  female with history of low back pain, T12 vertebral body fracture  Here for follow up  No incontinence of bowel/bladder, no fever, no chills, no night sweats  Physical exam showing no focal neurologic deficits    Imaging reviewed as above  Findings most notable for thoracic kyphosis    Impression of T7, T8, and T12 vertebral body fracture    Plan: The above clinical, physical and imaging findings were reviewed with the patient  Ronda Rodriguez  has a constellation of findings consistent with resolving low back pain in the setting of a fall with findings of multiple thoracic vertebral body fractures  Ronda Rodriguez is here for follow up  She denies any current back pain, noting she feels back to her usual self  She has not been wearing TLSO brace, noting it was uncomfortable  She no longer needs to wear brace  Ronda Rodriguez may follow up as needed  Patient instructed to return to office/ER sooner if symptoms are not improving, getting worse, or new worrisome/neurologic symptoms arise

## 2023-05-07 DIAGNOSIS — I50.41 ACUTE COMBINED SYSTOLIC AND DIASTOLIC CONGESTIVE HEART FAILURE (HCC): ICD-10-CM

## 2023-05-08 RX ORDER — LOSARTAN POTASSIUM 25 MG/1
TABLET ORAL
Qty: 90 TABLET | Refills: 5 | Status: SHIPPED | OUTPATIENT
Start: 2023-05-08

## 2023-05-24 DIAGNOSIS — I48.91 ATRIAL FIBRILLATION WITH RVR (HCC): ICD-10-CM

## 2023-05-24 DIAGNOSIS — I50.41 ACUTE COMBINED SYSTOLIC AND DIASTOLIC CONGESTIVE HEART FAILURE (HCC): ICD-10-CM

## 2023-05-24 RX ORDER — FUROSEMIDE 20 MG/1
20 TABLET ORAL EVERY OTHER DAY
Qty: 15 TABLET | Refills: 5 | Status: SHIPPED | OUTPATIENT
Start: 2023-05-24

## 2023-05-24 RX ORDER — DIGOXIN 125 MCG
125 TABLET ORAL
Qty: 21 TABLET | Refills: 5 | Status: SHIPPED | OUTPATIENT
Start: 2023-05-25

## 2023-06-02 ENCOUNTER — OFFICE VISIT (OUTPATIENT)
Dept: FAMILY MEDICINE CLINIC | Facility: CLINIC | Age: 83
End: 2023-06-02

## 2023-06-02 VITALS
DIASTOLIC BLOOD PRESSURE: 88 MMHG | HEIGHT: 59 IN | OXYGEN SATURATION: 99 % | SYSTOLIC BLOOD PRESSURE: 148 MMHG | TEMPERATURE: 97.5 F | BODY MASS INDEX: 19.15 KG/M2 | WEIGHT: 95 LBS | HEART RATE: 86 BPM

## 2023-06-02 DIAGNOSIS — E78.5 HYPERLIPIDEMIA, UNSPECIFIED HYPERLIPIDEMIA TYPE: Primary | ICD-10-CM

## 2023-06-02 DIAGNOSIS — I10 PRIMARY HYPERTENSION: ICD-10-CM

## 2023-06-02 DIAGNOSIS — I73.9 PAD (PERIPHERAL ARTERY DISEASE) (HCC): ICD-10-CM

## 2023-06-02 DIAGNOSIS — S22.000D CLOSED WEDGE FRACTURE OF THORACIC VERTEBRA WITH ROUTINE HEALING, UNSPECIFIED THORACIC VERTEBRAL LEVEL, SUBSEQUENT ENCOUNTER: ICD-10-CM

## 2023-06-02 NOTE — PROGRESS NOTES
Name: Aimee Mendoza      : 1940      MRN: [id number]  Encounter Provider: Shiloh Kimble MD  Encounter Date: 2023   Encounter department: [address] [place] [address] Place     1  Hyperlipidemia, unspecified hyperlipidemia type  Assessment & Plan:  Patient  is stable with current medication and we discussed a low fat low cholesterol diet  Weight loss also discussed for this will help lower cholesterol also  Recheck lipids in 6 months  2  Primary hypertension  Assessment & Plan:  Patient is stable with current anti-hypertensive medicine and continue to follow a low sodium diet and take current medication  All questions about this condition were answered today  3  PAD (peripheral artery disease) ([facility] [address] )  Assessment & Plan:  Patient is stable  and will continue present plan of care and reassess at next routine visit  All questions about this problem from patient were answered today  4  Closed wedge fracture of thoracic vertebra with routine healing, unspecified thoracic vertebral level, subsequent encounter  Assessment & Plan:  Patient is stable  and will continue present plan of care and reassess at next routine visit  All questions about this problem from patient were answered today  Urinary Incontinence Plan of Care: counseling topics discussed: practice Kegel (pelvic floor strengthening) exercises, use restroom every 2 hours, limit alcohol, caffeine, spicy foods, and acidic foods, limiting fluid intake 3-4 hours before bed, weight loss, preventing constipation and limiting fluid intake to 60 oz  per day  Subjective     Is a 1year-old female today for checkup on multiple medical problems patient hypertension hyperlipidemia pertinent peripheral artery disease history of compression fracture of her spine as well as some CHF  The patient lives at home alone but she seems to take care of herself very well    Patient sees her cardiologist who has lab work ordered for her  We will see her back in approximately 3 months  Review of Systems   Constitutional: Negative for activity change, appetite change, fatigue and fever  HENT: Negative for congestion, ear pain, postnasal drip, rhinorrhea, sinus pressure, sinus pain, sneezing and sore throat  Eyes: Negative for pain and redness  Respiratory: Negative for apnea, cough, chest tightness, shortness of breath and wheezing  Cardiovascular: Negative for chest pain, palpitations and leg swelling  Gastrointestinal: Negative for abdominal pain, constipation, diarrhea, nausea and vomiting  Endocrine: Negative for cold intolerance and heat intolerance  Genitourinary: Negative for difficulty urinating, dysuria, frequency, hematuria and urgency  Musculoskeletal: Negative for arthralgias, back pain, gait problem and myalgias  Skin: Negative for rash  Neurological: Negative for dizziness, speech difficulty, weakness, numbness and headaches  Hematological: Does not bruise/bleed easily  Psychiatric/Behavioral: Negative for agitation, confusion and hallucinations  Past Medical History:   Diagnosis Date   • Atrial fibrillation ([facility] [address] )    • Hypercholesteremia      Past Surgical History:   Procedure Laterality Date   • EMBOLIZATION     • HERNIA REPAIR       History reviewed  No pertinent family history  Social History     Socioeconomic History   • Marital status:      Spouse name: None   • Number of children: None   • Years of education: None   • Highest education level: None   Occupational History   • None   Tobacco Use   • Smoking status: Former     Types: Cigarettes     Quit date:      Years since quittin 4   • Smokeless tobacco: Never   Vaping Use   • Vaping Use: Never used   Substance and Sexual Activity   • Alcohol use:  Yes     Alcohol/week: 7 0 standard drinks of alcohol     Types: 7 Standard drinks or equivalent per week     Comment: one glass wine once a week   • Drug use: Not Currently   • Sexual activity: None   Other Topics Concern   • None   Social History Narrative   • None     Social Determinants of Health     Financial Resource Strain: Low Risk  (2/2/2023)    Overall Financial Resource Strain (CARDIA)    • Difficulty of Paying Living Expenses: Not hard at all   Food Insecurity: No Food Insecurity (8/17/2022)    Hunger Vital Sign    • Worried About Running Out of Food in the Last Year: Never true    • Ran Out of Food in the Last Year: Never true   Transportation Needs: No Transportation Needs (2/2/2023)    PRAPARE - Transportation    • Lack of Transportation (Medical): No    • Lack of Transportation (Non-Medical):  No   Physical Activity: Not on file   Stress: Not on file   Social Connections: Not on file   Intimate Partner Violence: Not on file   Housing Stability: Low Risk  (8/17/2022)    Housing Stability Vital Sign    • Unable to Pay for Housing in the Last Year: No    • Number of Places Lived in the Last Year: 1    • Unstable Housing in the Last Year: No     Current Outpatient Medications on File Prior to Visit   Medication Sig   • atorvastatin (LIPITOR) 10 mg tablet Take 1 tablet (10 mg total) by mouth daily   • digoxin (LANOXIN) 0 125 mg tablet Take 1 tablet (125 mcg total) by mouth 4 (four) times a week Advised patient to take 5 times weekly   • furosemide (LASIX) 20 mg tablet Take 1 tablet (20 mg total) by mouth every other day   • losartan (COZAAR) 25 mg tablet take 1 tablet by mouth once daily   • metoprolol succinate (TOPROL-XL) 25 mg 24 hr tablet Take 1 tablet (25 mg total) by mouth 3 (three) times a day   • metoprolol tartrate (LOPRESSOR) 100 mg tablet Take 1 tablet (100 mg total) by mouth every 12 (twelve) hours   • rivaroxaban (XARELTO) 15 mg tablet Take 1 tablet (15 mg total) by mouth every evening   • spironolactone (ALDACTONE) 25 mg tablet Take 0 5 tablets (12 5 mg total) by mouth every other day     No Known Allergies  Immunization History   Administered Date(s) "Administered   • COVID-19 PFIZER VACCINE 0 3 ML IM 03/10/2021, 03/31/2021   • Influenza, high dose seasonal 0 7 mL 02/02/2023   • Tdap 08/22/2021       Objective     /88 (BP Location: Left arm, Patient Position: Sitting, Cuff Size: Child)   Pulse 86   Temp 97 5 °F (36 4 °C) (Skin)   Ht 4' 11\" (1 499 m)   Wt 43 1 kg (95 lb)   SpO2 99%   BMI 19 19 kg/m²     Physical Exam  Mila Lou MD  "

## 2023-06-04 DIAGNOSIS — I48.20 CHRONIC ATRIAL FIBRILLATION (HCC): ICD-10-CM

## 2023-06-05 DIAGNOSIS — E78.5 HYPERLIPIDEMIA, UNSPECIFIED HYPERLIPIDEMIA TYPE: ICD-10-CM

## 2023-06-05 RX ORDER — ATORVASTATIN CALCIUM 10 MG/1
10 TABLET, FILM COATED ORAL DAILY
Qty: 90 TABLET | Refills: 3 | Status: SHIPPED | OUTPATIENT
Start: 2023-06-05

## 2023-06-05 RX ORDER — RIVAROXABAN 15 MG/1
TABLET, FILM COATED ORAL
Qty: 90 TABLET | Refills: 1 | Status: SHIPPED | OUTPATIENT
Start: 2023-06-05

## 2023-08-25 ENCOUNTER — OFFICE VISIT (OUTPATIENT)
Dept: CARDIOLOGY CLINIC | Facility: CLINIC | Age: 83
End: 2023-08-25
Payer: COMMERCIAL

## 2023-08-25 VITALS
OXYGEN SATURATION: 98 % | DIASTOLIC BLOOD PRESSURE: 104 MMHG | WEIGHT: 96 LBS | SYSTOLIC BLOOD PRESSURE: 176 MMHG | HEIGHT: 59 IN | HEART RATE: 98 BPM | BODY MASS INDEX: 19.35 KG/M2

## 2023-08-25 DIAGNOSIS — I48.20 CHRONIC ATRIAL FIBRILLATION (HCC): ICD-10-CM

## 2023-08-25 DIAGNOSIS — I50.41 ACUTE COMBINED SYSTOLIC AND DIASTOLIC CONGESTIVE HEART FAILURE (HCC): ICD-10-CM

## 2023-08-25 DIAGNOSIS — N18.9 CHRONIC RENAL IMPAIRMENT, UNSPECIFIED CKD STAGE: ICD-10-CM

## 2023-08-25 DIAGNOSIS — I50.42 CHRONIC COMBINED SYSTOLIC AND DIASTOLIC CONGESTIVE HEART FAILURE (HCC): ICD-10-CM

## 2023-08-25 DIAGNOSIS — R06.02 SHORTNESS OF BREATH: ICD-10-CM

## 2023-08-25 DIAGNOSIS — E78.5 HYPERLIPIDEMIA, UNSPECIFIED HYPERLIPIDEMIA TYPE: ICD-10-CM

## 2023-08-25 DIAGNOSIS — N18.32 STAGE 3B CHRONIC KIDNEY DISEASE (HCC): ICD-10-CM

## 2023-08-25 PROCEDURE — 93000 ELECTROCARDIOGRAM COMPLETE: CPT | Performed by: INTERNAL MEDICINE

## 2023-08-25 PROCEDURE — 99214 OFFICE O/P EST MOD 30 MIN: CPT | Performed by: INTERNAL MEDICINE

## 2023-08-25 RX ORDER — LOSARTAN POTASSIUM 50 MG/1
50 TABLET ORAL DAILY
Qty: 90 TABLET | Refills: 2 | Status: SHIPPED | OUTPATIENT
Start: 2023-08-25

## 2023-08-25 RX ORDER — SPIRONOLACTONE 25 MG/1
12.5 TABLET ORAL EVERY OTHER DAY
Qty: 45 TABLET | Refills: 2 | Status: SHIPPED | OUTPATIENT
Start: 2023-08-25 | End: 2025-02-15

## 2023-08-25 RX ORDER — METOPROLOL SUCCINATE 25 MG/1
25 TABLET, EXTENDED RELEASE ORAL 3 TIMES DAILY
Qty: 270 TABLET | Refills: 1 | Status: SHIPPED | OUTPATIENT
Start: 2023-08-25

## 2023-08-25 NOTE — PROGRESS NOTES
Progress Note - Cardiology Office  [facility]    Colton Kessler 80 y.o. female MRN: [id number]  : 1940  Encounter: [id number]      Assessment:     1. Chronic atrial fibrillation ([address])    2. Chronic combined systolic and diastolic congestive heart failure ([address])    3. Shortness of breath    4. Chronic renal impairment, unspecified CKD stage    5. Hyperlipidemia, unspecified hyperlipidemia type    6. Stage 3b chronic kidney disease ([address])    7. Acute combined systolic and diastolic congestive heart failure ([address])        Discussion Summary and Plan:    1. Atrial fibrillation with rapid ventricular rate. Patient's heart rate remained faster. She has appeared to be euvolemic. Will increase metoprolol to 275 mg a day in divided doses along with digoxin 0.125 mg 5 times a week and will check digoxin level. Heart rate is improved. 2. Chronic systolic and diastolic heart failure. She is known to have cardiomyopathy her EF is around 30%. She does not want defibrillator. Hopefully when her heart rate improves her EF will be better. Notes of volume overload. Currently she is taking Aldactone 12.5 mg to alternate with 20 mg Lasix. Significant leg edema we will continue same dose of diuretics. 3. Exertional shortness of breath. Is feeling much better denies any shortness of breath with normal activities. No evidence of volume overload. 4. Dyslipidemia. Continue statins    5. CRI. She is on Xarelto 15 mg daily as her weight is only 48 kg. She is also taking Lasix 20 mg every other day along with Aldactone 12.5 every other day and she is on heart failure medication with metoprolol and losartan. She came with her friend. No seizure discussed with her. 6.  Labile hypertension. Her blood pressure has been high. Increase losartan to 50 mg. Check BMP. Plan.     Increase metoprolol XL 25 mg 3 times daily and continue taking metoprolol  mg twice a day as before    Continue digoxin as before    BMP. Continue current dose of diuretics. Continue other cardiac medication as before. Follow-up 6 months. Patient  And her friend was advised and educated to call our office  immediately if  patient has any new symptoms of chest pain/shortness of breath, near-syncope, syncope, light headedness sustained palpitations  or any other cardiovascular symptoms before their scheduled follow-up appointment. Office #[phone]. Please call [phone] if any questions. Counseling :  A description of the counseling. Goals and Barriers. Patient's ability to self care: Yes  Medication side effect reviewed with patient in detail and all their questions answered to their satisfaction. HPI :     Bitna Dong is a 80y.o. year old female who came for follow up. Patient was in size recently admitted to [facility] with shortness of breath and was known to have chronic systolic and diastolic heart failure and chronic atrial fibrillation. She also has CRI. She used to follow with [facility] and has history of cardiomyopathy difficult to control rate. Her rate was better with beta-blockers and digoxin however she was not taking metoprolol 50 twice a day instead of 100 twice a day. She is also on heart failure medications. She came with her friend. Otherwise she is doing well leg edema has improved and her shortness of breath is much better. Echo Doppler shows her EF is around 30%. 11/30/2022. Above reviewed. Patient came follow-up. She was admitted to [facility] with AFib with rapid ventricular rate and chronic systolic and diastolic heart failure. History of chronic atrial fibrillation. She used to follow with [facility] and had history of difficulty control of heart rate. She is known to have EF around 35 40%.   Her heart rate is better with beta-blocker and digoxin however she was not taking the full dose. Her blood test from August 2022 reviewed. Her heart rate still remains elevated. Is around 120 beats per minute. She is taking metoprolol 100 twice a day and digoxin twice a week. 2/9/2023. Above reviewed. Patient came for follow-up. She has a medical history significant for chronic systolic diastolic heart failure, chronic atrial fibrillation, dyslipidemia who came for follow-up. Her heart rate has been difficult to control when she is in atrial fibrillation. Her EF is around 35 to 40%. She has blood test done on 1/10/2023 which shows her BUN was 29 creatinine 1.2. And her cholesterol profile was acceptable her hemoglobin is stable. She feels well she has no leg edema. She drinks about 1500 cc encouraged her to drink about 1800 cc. Sodium was 138. EKG today shows atrial fibrillation heart rate ranging from 90 to 120 bpm.  She does not feel much palpitations. 8/25/2023. Above reviewed. Patient came for follow-up. She is feeling well. But blood pressure was high. Heart rate has improved. Her medication reviewed with her. She had history of chronic systolic and diastolic heart failure, chronic atrial fibrillation, dyslipidemia who came for follow-up. No leg swelling. Her blood work from January 2020 reviewed. She is scheduled to have repeat blood test.  No other cardiovascular issues at this time. She does not monitor her blood pressure at home. She feels well she came with her friend. Review of Systems   Constitutional: Negative for activity change, chills, diaphoresis, fever and unexpected weight change. HENT: Negative for congestion. Eyes: Negative for discharge and redness. Respiratory: Negative for cough, chest tightness, shortness of breath and wheezing. Cardiovascular: Negative. Negative for chest pain, palpitations and leg swelling. Gastrointestinal: Negative for abdominal pain, diarrhea and nausea. Endocrine: Negative.     Genitourinary: Negative for decreased urine volume and urgency. Musculoskeletal: Negative. Negative for arthralgias, back pain and gait problem. Skin: Negative for rash and wound. Allergic/Immunologic: Negative. Neurological: Negative for dizziness, seizures, syncope, weakness, light-headedness and headaches. Hematological: Negative. Psychiatric/Behavioral: Negative for agitation and confusion. The patient is not nervous/anxious. Historical Information   Past Medical History:   Diagnosis Date   • Atrial fibrillation ([address])    • Hypercholesteremia      Past Surgical History:   Procedure Laterality Date   • EMBOLIZATION     • HERNIA REPAIR       Social History     Substance and Sexual Activity   Alcohol Use Yes   • Alcohol/week: 7.0 standard drinks of alcohol   • Types: 7 Standard drinks or equivalent per week    Comment: one glass wine once a week     Social History     Substance and Sexual Activity   Drug Use Not Currently     Social History     Tobacco Use   Smoking Status Former   • Types: Cigarettes   • Quit date:    • Years since quittin.6   Smokeless Tobacco Never     Family History: History reviewed. No pertinent family history.     Meds/Allergies     No Known Allergies    Current Outpatient Medications:   •  atorvastatin (LIPITOR) 10 mg tablet, Take 1 tablet (10 mg total) by mouth daily, Disp: 90 tablet, Rfl: 3  •  digoxin (LANOXIN) 0.125 mg tablet, Take 1 tablet (125 mcg total) by mouth 4 (four) times a week Advised patient to take 5 times weekly, Disp: 21 tablet, Rfl: 5  •  furosemide (LASIX) 20 mg tablet, Take 1 tablet (20 mg total) by mouth every other day, Disp: 15 tablet, Rfl: 5  •  losartan (COZAAR) 50 mg tablet, Take 1 tablet (50 mg total) by mouth daily, Disp: 90 tablet, Rfl: 2  •  metoprolol succinate (TOPROL-XL) 25 mg 24 hr tablet, Take 1 tablet (25 mg total) by mouth 3 (three) times a day, Disp: 270 tablet, Rfl: 1  •  spironolactone (ALDACTONE) 25 mg tablet, Take 0.5 tablets (12.5 mg total) by mouth every other day, Disp: 45 tablet, Rfl: 2  •  Xarelto 15 MG tablet, take 1 tablet by mouth every evening, Disp: 90 tablet, Rfl: 1  •  metoprolol tartrate (LOPRESSOR) 100 mg tablet, Take 1 tablet (100 mg total) by mouth every 12 (twelve) hours, Disp: 180 tablet, Rfl: 3    Vitals: Blood pressure (!) 176/104, pulse 98, height 4' 11" (1.499 m), weight 43.5 kg (96 lb), SpO2 98 %. ?  Body mass index is 19.39 kg/m². Wt Readings from Last 3 Encounters:   08/25/23 43.5 kg (96 lb)   06/02/23 43.1 kg (95 lb)   04/03/23 45.8 kg (100 lb 15.5 oz)     Vitals:    08/25/23 1452   Weight: 43.5 kg (96 lb)     BP Readings from Last 3 Encounters:   08/25/23 (!) 176/104   06/02/23 148/88   04/03/23 (!) 158/106         Physical Exam  Constitutional:       General: She is not in acute distress. Appearance: She is well-developed. She is not diaphoretic. Neck:      Thyroid: No thyromegaly. Vascular: No JVD. Cardiovascular:      Rate and Rhythm: Normal rate. Rhythm irregularly irregular. Heart sounds: S1 normal and S2 normal. Heart sounds not distant. Murmur heard. Systolic murmur is present. No friction rub. No gallop. No S3 or S4 sounds. Pulmonary:      Effort: Pulmonary effort is normal. No respiratory distress. Breath sounds: Normal breath sounds. No wheezing or rales. Chest:      Chest wall: No tenderness. Abdominal:      General: There is no distension. Palpations: Abdomen is soft. Tenderness: There is no abdominal tenderness. Musculoskeletal:         General: No deformity. Cervical back: Neck supple. Lymphadenopathy:      Cervical: No cervical adenopathy. Skin:     General: Skin is warm and dry. Coloration: Skin is not pale. Findings: No rash. Neurological:      Mental Status: She is alert and oriented to person, place, and time.    Psychiatric:         Judgment: Judgment normal.             Diagnostic Studies Review Cardio:  Echo Doppler reviewed. To monitor. Holter monitor done 12/6/2022 shows average heart rate 89 bpm.  PACs and PVCs noted no other pauses. EKG:  Twelve lead EKG 08/26/2022 shows with heart rate 115 beats per minute rare PVC noted    Twelve lead EKG done on 11/30/2022 shows atrial fibrillation with heart rate 120 beats per minute. As compared to previous EKG no changes heart remains elevated    Twelve-lead EKG done on 2/9/2023 shows atrial fibrillation with heart rate ranging from 90 - 120 bpm.    Twelve-lead EKG done 8/25/2020 shows atrial fibrillation heart rate 98 bpm.  Heart rate better than before. XR chest 1 view portable    Result Date: 8/14/2022  Narrative: CHEST INDICATION:   swelling. COMPARISON:  Chest radiograph from 11/6/2015. EXAM PERFORMED/VIEWS:  XR CHEST PORTABLE FINDINGS: Cardiomediastinal silhouette appears unremarkable. The lungs are clear. No pneumothorax or pleural effusion. Osseous structures appear within normal limits for patient age. Impression: No acute cardiopulmonary disease. Workstation performed: [id number]     Echo complete w/ contrast if indicated    Result Date: 8/15/2022  Narrative: •  Left Ventricle: Left ventricular cavity size is normal. Wall thickness is normal. Systolic function is severely reduced. Estimated ejection fraction is 25-30%. Wall motion cannot be accurately assessed. Diastolic dysfunction, grade indeterminate due to underlying atrial fibrillation. •  Right Ventricle: Systolic function is reduced. •  Left Atrium: The atrium is severely dilated. •  Right Atrium: The atrium is moderately dilated. •  Mitral Valve: There is annular calcification. There is mild regurgitation. •  Tricuspid Valve: There is mild regurgitation. The right ventricular systolic pressure is mildly elevated.          Lab Review   Lab Results   Component Value Date    WBC 11.06 (H) 02/27/2023    HGB 13.4 02/27/2023    HCT 40.0 02/27/2023    MCV 95 02/27/2023    RDW 13.1 02/27/2023     02/27/2023     BMP:  Lab Results   Component Value Date    SODIUM 137 02/27/2023    K 4.4 02/27/2023     02/27/2023    CO2 29 02/27/2023    ANIONGAP 11.7 11/07/2015    BUN 21 02/27/2023    CREATININE 1.07 02/27/2023    GLUC 139 02/27/2023    CALCIUM 8.9 02/27/2023    CORRECTEDCA 9.3 08/15/2022    EGFR 48 02/27/2023    MG 2.2 01/10/2023     Troponins:    LFT:  Lab Results   Component Value Date    AST 17 01/10/2023    ALT 9 01/10/2023    ALKPHOS 75 01/10/2023    TP 7.5 01/10/2023    ALB 4.3 01/10/2023        Lab Results   Component Value Date    HGBA1C 6.0 (H) 04/27/2016     Lipid Profile:   Lab Results   Component Value Date    CHOLESTEROL 136 01/10/2023    HDL 28 (L) 01/10/2023    LDLCALC 81 01/10/2023    TRIG 178 (H) 01/10/2023     Lab Results   Component Value Date    CHOLESTEROL 136 01/10/2023     Lab Results   Component Value Date    TROPONINI <0.02 11/06/2015     Lab Results   Component Value Date    NTBNP 2,787 (H) 08/14/2022          Dr. Vik Merlos MD [facility]      "This note has been constructed using a voice recognition system. Therefore there may be syntax, spelling, and/or grammatical errors.  Please call if you have any questions. "

## 2023-08-28 ENCOUNTER — RA CDI HCC (OUTPATIENT)
Dept: OTHER | Facility: HOSPITAL | Age: 83
End: 2023-08-28

## 2023-08-28 NOTE — PROGRESS NOTES
[address] coding opportunities       Chart reviewed, no opportunity found: [address] Review     Patients Insurance     Medicare Insurance: [employer]

## 2023-09-05 ENCOUNTER — RA CDI HCC (OUTPATIENT)
Dept: OTHER | Facility: HOSPITAL | Age: 83
End: 2023-09-05

## 2023-09-05 NOTE — PROGRESS NOTES
[address] [facility] coding opportunities     I13.0, E11.51, E11.22     Chart Reviewed number of suggestions sent to Provider: 3     GR    Patients Insurance     Medicare Insurance: [facility]

## 2023-09-05 NOTE — ASSESSMENT & PLAN NOTE
Lab Results   Component Value Date    EGFR 48 02/27/2023    EGFR 45 (L) 01/10/2023    EGFR 46 08/19/2022    CREATININE 1.07 02/27/2023    CREATININE 1.21 (H) 01/10/2023    CREATININE 1.11 08/19/2022   Pt to avoid NSAIDs and any IV dyes. Patient to follow up eoither with nephrology or  with us for  further  monitoring of  renal function.

## 2023-09-05 NOTE — ASSESSMENT & PLAN NOTE
Wt Readings from Last 3 Encounters:   08/25/23 43.5 kg (96 lb)   06/02/23 43.1 kg (95 lb)   04/03/23 45.8 kg (100 lb 15.5 oz)     Patient is stable  and will continue present plan of care and reassess at next routine visit. All questions about this problem from patient were answered today.

## 2023-09-05 NOTE — PROGRESS NOTES
Name: Eliceo Gillis      : 1940      MRN: [id number]  Encounter Provider: Queta Hoyt MD  Encounter Date: 2023   Encounter department: [address] [place] [address] Avenue     1. Stage 3b chronic kidney disease [facility])  Assessment & Plan:  Lab Results   Component Value Date    EGFR 48 2023    EGFR 45 (L) 01/10/2023    EGFR 46 2022    CREATININE 1.07 2023    CREATININE 1.21 (H) 01/10/2023    CREATININE 1.11 2022   Pt to avoid NSAIDs and any IV dyes. Patient to follow up eoither with nephrology or  with us for  further  monitoring of  renal function. 2. Primary hypertension  Assessment & Plan:  Patient is stable with current anti-hypertensive medicine and continue to follow a low sodium diet and take current medication. All questions about this condition were answered today. 3. Hyperlipidemia, unspecified hyperlipidemia type  Assessment & Plan:  Patient  is stable with current medication and we discussed a low fat low cholesterol diet. Weight loss also discussed for this will help lower cholesterol also. Recheck lipids in 6 months. 4. Atrial fibrillation with RVR (HCC)  Assessment & Plan:  Continue with anticogulation and rate control of heart rate. Concerns about condition were addressed today. 5. Acute combined systolic and diastolic congestive heart failure (HCC)  Assessment & Plan:  Wt Readings from Last 3 Encounters:   23 43.5 kg (96 lb)   23 43.1 kg (95 lb)   23 45.8 kg (100 lb 15.5 oz)     Patient is stable  and will continue present plan of care and reassess at next routine visit. All questions about this problem from patient were answered today. Depression Screening and Follow-up Plan: Patient was screened for depression during today's encounter. They screened negative with a PHQ-2 score of 0. Clincally patient does not have depression. No treatment is required.      Urinary Incontinence Plan of Care: counseling topics discussed: practice Kegel (pelvic floor strengthening) exercises, use restroom every 2 hours, limit alcohol, caffeine, spicy foods, and acidic foods, limiting fluid intake 3-4 hours before bed, weight loss, preventing constipation and limiting fluid intake to 60 oz. per day. Subjective     Is a 80-year-old patient here today for checkup on multimedical problems. Patient with history of atrial fibrillation hypertension also failure to thrive and is actually doing quite well. Patient's blood pressure is well controlled she is tolerating medication quite well and does not need any refills at this point we have a long discussion about her use of her diuretics and she is alternating Lasix with her Aldactone every other day and she seems to be clear on that. Patient also with some more weight loss and I have given her some Ensure strawberry flavor which was to request some samples and working to see about getting her on something that to try and bring her weight up a little bit. Review of Systems   Constitutional: Negative for activity change, appetite change, fatigue and fever. HENT: Negative for congestion, ear pain, postnasal drip, rhinorrhea, sinus pressure, sinus pain, sneezing and sore throat. Eyes: Negative for pain and redness. Respiratory: Negative for apnea, cough, chest tightness, shortness of breath and wheezing. Cardiovascular: Negative for chest pain, palpitations and leg swelling. Gastrointestinal: Negative for abdominal pain, constipation, diarrhea, nausea and vomiting. Endocrine: Negative for cold intolerance and heat intolerance. Genitourinary: Negative for difficulty urinating, dysuria, frequency, hematuria and urgency. Musculoskeletal: Negative for arthralgias, back pain, gait problem and myalgias. Skin: Negative for rash. Neurological: Negative for dizziness, speech difficulty, weakness, numbness and headaches.    Hematological: Does not bruise/bleed easily. Psychiatric/Behavioral: Negative for agitation, confusion and hallucinations. Past Medical History:   Diagnosis Date   • Atrial fibrillation ([address])    • Hypercholesteremia      Past Surgical History:   Procedure Laterality Date   • EMBOLIZATION     • HERNIA REPAIR       History reviewed. No pertinent family history. Social History     Socioeconomic History   • Marital status:      Spouse name: None   • Number of children: None   • Years of education: None   • Highest education level: None   Occupational History   • None   Tobacco Use   • Smoking status: Former     Types: Cigarettes     Quit date:      Years since quittin.6   • Smokeless tobacco: Never   Vaping Use   • Vaping Use: Never used   Substance and Sexual Activity   • Alcohol use: Yes     Alcohol/week: 7.0 standard drinks of alcohol     Types: 7 Standard drinks or equivalent per week     Comment: one glass wine once a week   • Drug use: Not Currently   • Sexual activity: None   Other Topics Concern   • None   Social History Narrative   • None     Social Determinants of Health     Financial Resource Strain: Low Risk  (2023)    Overall Financial Resource Strain (CARDIA)    • Difficulty of Paying Living Expenses: Not hard at all   Food Insecurity: No Food Insecurity (2022)    Hunger Vital Sign    • Worried About Running Out of Food in the Last Year: Never true    • Ran Out of Food in the Last Year: Never true   Transportation Needs: No Transportation Needs (2023)    PRAPARE - Transportation    • Lack of Transportation (Medical): No    • Lack of Transportation (Non-Medical):  No   Physical Activity: Not on file   Stress: Not on file   Social Connections: Not on file   Intimate Partner Violence: Not on file   Housing Stability: Low Risk  (2022)    Housing Stability Vital Sign    • Unable to Pay for Housing in the Last Year: No    • Number of Places Lived in the Last Year: 1    • Unstable Housing in the Last Year: No     Current Outpatient Medications on File Prior to Visit   Medication Sig   • atorvastatin (LIPITOR) 10 mg tablet Take 1 tablet (10 mg total) by mouth daily   • digoxin (LANOXIN) 0.125 mg tablet Take 1 tablet (125 mcg total) by mouth 4 (four) times a week Advised patient to take 5 times weekly   • furosemide (LASIX) 20 mg tablet Take 1 tablet (20 mg total) by mouth every other day   • losartan (COZAAR) 50 mg tablet Take 1 tablet (50 mg total) by mouth daily   • metoprolol succinate (TOPROL-XL) 25 mg 24 hr tablet Take 1 tablet (25 mg total) by mouth 3 (three) times a day   • spironolactone (ALDACTONE) 25 mg tablet Take 0.5 tablets (12.5 mg total) by mouth every other day   • Xarelto 15 MG tablet take 1 tablet by mouth every evening   • metoprolol tartrate (LOPRESSOR) 100 mg tablet Take 1 tablet (100 mg total) by mouth every 12 (twelve) hours     No Known Allergies  Immunization History   Administered Date(s) Administered   • COVID-19 PFIZER VACCINE 0.3 ML IM 03/10/2021, 03/31/2021   • Influenza, high dose seasonal 0.7 mL 02/02/2023   • Tdap 08/22/2021       Objective     /86 (BP Location: Left arm, Patient Position: Sitting, Cuff Size: Standard)   Pulse 72   Temp 98.9 °F (37.2 °C) (Temporal)   Resp 14   Ht 4' 11" (1.499 m)   Wt 42.6 kg (94 lb)   SpO2 96%   BMI 18.99 kg/m²     Physical Exam  Vitals and nursing note reviewed. Constitutional:       Appearance: She is well-developed. HENT:      Head: Normocephalic and atraumatic. Nose: Nose normal.      Mouth/Throat:      Mouth: Mucous membranes are moist.   Eyes:      General: No scleral icterus. Conjunctiva/sclera: Conjunctivae normal.      Pupils: Pupils are equal, round, and reactive to light. Neck:      Thyroid: No thyromegaly. Cardiovascular:      Rate and Rhythm: Normal rate. Rhythm irregular. Pulmonary:      Effort: Pulmonary effort is normal.      Breath sounds: Normal breath sounds. No wheezing.    Abdominal: General: Bowel sounds are normal. There is no distension. Palpations: Abdomen is soft. Tenderness: There is no abdominal tenderness. There is no guarding or rebound. Musculoskeletal:         General: No tenderness or deformity. Normal range of motion. Cervical back: Normal range of motion and neck supple. Skin:     General: Skin is warm and dry. Findings: No erythema or rash. Neurological:      Mental Status: She is alert and oriented to person, place, and time. Sensory: No sensory deficit. Psychiatric:         Mood and Affect: Mood normal.         Behavior: Behavior normal.         Thought Content:  Thought content normal.         Judgment: Judgment normal.       Adela Cristina MD

## 2023-09-06 ENCOUNTER — OFFICE VISIT (OUTPATIENT)
Dept: FAMILY MEDICINE CLINIC | Facility: CLINIC | Age: 83
End: 2023-09-06
Payer: COMMERCIAL

## 2023-09-06 VITALS
RESPIRATION RATE: 14 BRPM | TEMPERATURE: 98.9 F | DIASTOLIC BLOOD PRESSURE: 86 MMHG | HEIGHT: 59 IN | WEIGHT: 94 LBS | SYSTOLIC BLOOD PRESSURE: 140 MMHG | OXYGEN SATURATION: 96 % | BODY MASS INDEX: 18.95 KG/M2 | HEART RATE: 72 BPM

## 2023-09-06 DIAGNOSIS — I48.91 ATRIAL FIBRILLATION WITH RVR (HCC): ICD-10-CM

## 2023-09-06 DIAGNOSIS — N18.32 STAGE 3B CHRONIC KIDNEY DISEASE (HCC): Primary | ICD-10-CM

## 2023-09-06 DIAGNOSIS — I50.41 ACUTE COMBINED SYSTOLIC AND DIASTOLIC CONGESTIVE HEART FAILURE (HCC): ICD-10-CM

## 2023-09-06 DIAGNOSIS — E78.5 HYPERLIPIDEMIA, UNSPECIFIED HYPERLIPIDEMIA TYPE: ICD-10-CM

## 2023-09-06 DIAGNOSIS — I10 PRIMARY HYPERTENSION: ICD-10-CM

## 2023-09-06 PROCEDURE — 99214 OFFICE O/P EST MOD 30 MIN: CPT | Performed by: FAMILY MEDICINE

## 2023-11-27 DIAGNOSIS — I48.91 ATRIAL FIBRILLATION WITH RVR (HCC): ICD-10-CM

## 2023-11-27 RX ORDER — DIGOXIN 125 MCG
125 TABLET ORAL
Qty: 48 TABLET | Refills: 0 | Status: SHIPPED | OUTPATIENT
Start: 2023-11-28

## 2023-11-28 ENCOUNTER — RA CDI HCC (OUTPATIENT)
Dept: OTHER | Facility: HOSPITAL | Age: 83
End: 2023-11-28

## 2023-11-28 NOTE — PROGRESS NOTES
[address] coding opportunities       Chart reviewed, no opportunity found: [address] Review     Patients Insurance     Medicare Insurance: [employer]

## 2023-12-06 ENCOUNTER — OFFICE VISIT (OUTPATIENT)
Dept: FAMILY MEDICINE CLINIC | Facility: CLINIC | Age: 83
End: 2023-12-06
Payer: COMMERCIAL

## 2023-12-06 VITALS
WEIGHT: 98 LBS | RESPIRATION RATE: 16 BRPM | TEMPERATURE: 98.1 F | BODY MASS INDEX: 22.68 KG/M2 | DIASTOLIC BLOOD PRESSURE: 100 MMHG | SYSTOLIC BLOOD PRESSURE: 166 MMHG | HEIGHT: 55 IN | HEART RATE: 75 BPM | OXYGEN SATURATION: 99 %

## 2023-12-06 DIAGNOSIS — N18.32 TYPE 2 DIABETES MELLITUS WITH STAGE 3B CHRONIC KIDNEY DISEASE, WITHOUT LONG-TERM CURRENT USE OF INSULIN (HCC): ICD-10-CM

## 2023-12-06 DIAGNOSIS — E78.5 HYPERLIPIDEMIA, UNSPECIFIED HYPERLIPIDEMIA TYPE: ICD-10-CM

## 2023-12-06 DIAGNOSIS — E11.22 TYPE 2 DIABETES MELLITUS WITH STAGE 3B CHRONIC KIDNEY DISEASE, WITHOUT LONG-TERM CURRENT USE OF INSULIN (HCC): ICD-10-CM

## 2023-12-06 DIAGNOSIS — E11.21 TYPE 2 DIABETES MELLITUS WITH DIABETIC NEPHROPATHY, WITHOUT LONG-TERM CURRENT USE OF INSULIN (HCC): ICD-10-CM

## 2023-12-06 DIAGNOSIS — I10 PRIMARY HYPERTENSION: ICD-10-CM

## 2023-12-06 DIAGNOSIS — E44.1 MILD PROTEIN-CALORIE MALNUTRITION (HCC): ICD-10-CM

## 2023-12-06 DIAGNOSIS — N17.9 AKI (ACUTE KIDNEY INJURY) (HCC): ICD-10-CM

## 2023-12-06 DIAGNOSIS — Z23 ENCOUNTER FOR IMMUNIZATION: ICD-10-CM

## 2023-12-06 DIAGNOSIS — N18.32 STAGE 3B CHRONIC KIDNEY DISEASE (HCC): Primary | ICD-10-CM

## 2023-12-06 DIAGNOSIS — I48.91 ATRIAL FIBRILLATION WITH RVR (HCC): ICD-10-CM

## 2023-12-06 PROCEDURE — G0008 ADMIN INFLUENZA VIRUS VAC: HCPCS | Performed by: FAMILY MEDICINE

## 2023-12-06 PROCEDURE — 90662 IIV NO PRSV INCREASED AG IM: CPT | Performed by: FAMILY MEDICINE

## 2023-12-06 PROCEDURE — 99214 OFFICE O/P EST MOD 30 MIN: CPT | Performed by: FAMILY MEDICINE

## 2023-12-06 NOTE — PROGRESS NOTES
Name: Brenda Douglas      : 1940      MRN: [id number]  Encounter Provider: Delisa Barrios MD  Encounter Date: 2023   Encounter department: [facility]     1. Stage 3b chronic kidney disease [facility])  Assessment & Plan:  Lab Results   Component Value Date    EGFR 48 2023    EGFR 45 (L) 01/10/2023    EGFR 46 2022    CREATININE 1.07 2023    CREATININE 1.21 (H) 01/10/2023    CREATININE 1.11 2022         2. Primary hypertension  Assessment & Plan:  Patient is stable with current anti-hypertensive medicine and continue to follow a low sodium diet and take current medication. All questions about this condition were answered today. 3. Hyperlipidemia, unspecified hyperlipidemia type  Assessment & Plan:  Patient  is stable with current medication and we discussed a low fat low cholesterol diet. Weight loss also discussed for this will help lower cholesterol also. Recheck lipids in 6 months. Orders:  -     Comprehensive metabolic panel; Future  -     Lipid Panel with Direct LDL reflex; Future    4. Atrial fibrillation with RVR (HCC)  Assessment & Plan:  Continue with anticogulation and rate control of heart rate. Concerns about condition were addressed today. Orders:  -     Digoxin level; Future    5. JUANITA (acute kidney injury) ([address])  Assessment & Plan:  Pt to avoid NSAIDs and any IV dyes. Patient to follow up eoither with nephrology or  with us for  further  monitoring of  renal function. 6. Encounter for immunization  -     influenza vaccine, high-dose, PF 0.7 mL (FLUZONE HIGH-DOSE)    7. Type 2 diabetes mellitus with stage 3b chronic kidney disease, without long-term current use of insulin ([address])    8. Type 2 diabetes mellitus with diabetic nephropathy, without long-term current use of insulin ([address])    9.  Mild protein-calorie malnutrition ([address])        Falls Plan of Care: balance, strength, and gait training instructions were provided. Home safety education provided. Urinary Incontinence Plan of Care: counseling topics discussed: practice Kegel (pelvic floor strengthening) exercises, use restroom every 2 hours, limit alcohol, caffeine, spicy foods, and acidic foods, limiting fluid intake 3-4 hours before bed, weight loss, preventing constipation and limiting fluid intake to 60 oz. per day. Subjective     HPI  Review of Systems    Past Medical History:   Diagnosis Date   • Atrial fibrillation ([address])    • Hypercholesteremia      Past Surgical History:   Procedure Laterality Date   • EMBOLIZATION     • HERNIA REPAIR       History reviewed. No pertinent family history. Social History     Socioeconomic History   • Marital status:      Spouse name: None   • Number of children: None   • Years of education: None   • Highest education level: None   Occupational History   • None   Tobacco Use   • Smoking status: Former     Types: Cigarettes     Quit date:      Years since quittin.9   • Smokeless tobacco: Never   Vaping Use   • Vaping Use: Never used   Substance and Sexual Activity   • Alcohol use: Yes     Alcohol/week: 7.0 standard drinks of alcohol     Types: 7 Standard drinks or equivalent per week     Comment: one glass wine once a week   • Drug use: Not Currently   • Sexual activity: None   Other Topics Concern   • None   Social History Narrative   • None     Social Determinants of Health     Financial Resource Strain: Low Risk  (2023)    Overall Financial Resource Strain (CARDIA)    • Difficulty of Paying Living Expenses: Not hard at all   Food Insecurity: No Food Insecurity (2022)    Hunger Vital Sign    • Worried About Running Out of Food in the Last Year: Never true    • Ran Out of Food in the Last Year: Never true   Transportation Needs: No Transportation Needs (2023)    PRAPARE - Transportation    • Lack of Transportation (Medical): No    • Lack of Transportation (Non-Medical):  No   Physical Activity: Not on file   Stress: Not on file   Social Connections: Not on file   Intimate Partner Violence: Not on file   Housing Stability: Low Risk  (8/17/2022)    Housing Stability Vital Sign    • Unable to Pay for Housing in the Last Year: No    • Number of Places Lived in the Last Year: 1    • Unstable Housing in the Last Year: No     Current Outpatient Medications on File Prior to Visit   Medication Sig   • atorvastatin (LIPITOR) 10 mg tablet Take 1 tablet (10 mg total) by mouth daily   • digoxin (LANOXIN) 0.125 mg tablet Take 1 tablet (125 mcg total) by mouth 4 (four) times a week Advised patient to take 5 times weekly   • furosemide (LASIX) 20 mg tablet Take 1 tablet (20 mg total) by mouth every other day   • losartan (COZAAR) 50 mg tablet Take 1 tablet (50 mg total) by mouth daily   • metoprolol succinate (TOPROL-XL) 25 mg 24 hr tablet Take 1 tablet (25 mg total) by mouth 3 (three) times a day   • metoprolol tartrate (LOPRESSOR) 100 mg tablet Take 1 tablet (100 mg total) by mouth every 12 (twelve) hours   • spironolactone (ALDACTONE) 25 mg tablet Take 0.5 tablets (12.5 mg total) by mouth every other day   • Xarelto 15 MG tablet take 1 tablet by mouth every evening     No Known Allergies  Immunization History   Administered Date(s) Administered   • COVID-19 PFIZER VACCINE 0.3 ML IM 03/10/2021, 03/31/2021   • Influenza, high dose seasonal 0.7 mL 02/02/2023, 12/06/2023   • Tdap 08/22/2021       Objective     /100 (BP Location: Left arm, Patient Position: Sitting, Cuff Size: Child)   Pulse 75   Temp 98.1 °F (36.7 °C) (Temporal)   Resp 16   Ht 4' 6.25" (1.378 m)   Wt 44.5 kg (98 lb)   SpO2 99%   BMI 23.41 kg/m²     Physical Exam  Venice Zhong MD

## 2023-12-06 NOTE — ASSESSMENT & PLAN NOTE
Lab Results   Component Value Date    EGFR 48 02/27/2023    EGFR 45 (L) 01/10/2023    EGFR 46 08/19/2022    CREATININE 1.07 02/27/2023    CREATININE 1.21 (H) 01/10/2023    CREATININE 1.11 08/19/2022

## 2023-12-06 NOTE — ASSESSMENT & PLAN NOTE
Pt to avoid NSAIDs and any IV dyes. Patient to follow up eoither with nephrology or  with us for  further  monitoring of  renal function.

## 2023-12-13 DIAGNOSIS — I48.20 CHRONIC ATRIAL FIBRILLATION (HCC): ICD-10-CM

## 2023-12-29 ENCOUNTER — TELEPHONE (OUTPATIENT)
Dept: SURGERY | Facility: CLINIC | Age: 83
End: 2023-12-29

## 2024-01-25 ENCOUNTER — HOSPITAL ENCOUNTER (INPATIENT)
Facility: HOSPITAL | Age: 84
LOS: 5 days | Discharge: NURSING, REHAB OR LONG-TERM CARE | DRG: 184 | End: 2024-01-30
Attending: STUDENT IN AN ORGANIZED HEALTH CARE EDUCATION/TRAINING PROGRAM | Admitting: STUDENT IN AN ORGANIZED HEALTH CARE EDUCATION/TRAINING PROGRAM
Payer: COMMERCIAL

## 2024-01-25 ENCOUNTER — HOSPITAL ENCOUNTER (EMERGENCY)
Facility: HOSPITAL | Age: 84
End: 2024-01-25
Attending: EMERGENCY MEDICINE
Payer: COMMERCIAL

## 2024-01-25 ENCOUNTER — APPOINTMENT (EMERGENCY)
Dept: RADIOLOGY | Facility: HOSPITAL | Age: 84
End: 2024-01-25
Payer: COMMERCIAL

## 2024-01-25 VITALS
TEMPERATURE: 97.5 F | WEIGHT: 99.2 LBS | HEART RATE: 87 BPM | OXYGEN SATURATION: 97 % | DIASTOLIC BLOOD PRESSURE: 98 MMHG | RESPIRATION RATE: 20 BRPM | SYSTOLIC BLOOD PRESSURE: 173 MMHG | BODY MASS INDEX: 23.7 KG/M2
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
## 2024-01-25 DIAGNOSIS — S22.49XA MULTIPLE RIB FRACTURES: ICD-10-CM

## 2024-01-25 DIAGNOSIS — S22.41XA CLOSED FRACTURE OF MULTIPLE RIBS OF RIGHT SIDE, INITIAL ENCOUNTER: Primary | ICD-10-CM

## 2024-01-25 DIAGNOSIS — R73.9 HYPERGLYCEMIA: ICD-10-CM

## 2024-01-25 DIAGNOSIS — E87.1 HYPONATREMIA: ICD-10-CM

## 2024-01-25 LAB
ALBUMIN SERPL BCP-MCNC: 4.1 G/DL (ref 3.5–5)
ALBUMIN SERPL BCP-MCNC: 4.1 G/DL (ref 3.5–5)
ALP SERPL-CCNC: 65 U/L (ref 34–104)
ALP SERPL-CCNC: 65 U/L (ref 34–104)
ALT SERPL W P-5'-P-CCNC: 10 U/L (ref 7–52)
ALT SERPL W P-5'-P-CCNC: 8 U/L (ref 7–52)
ANION GAP SERPL CALCULATED.3IONS-SCNC: 11 MMOL/L
ANION GAP SERPL CALCULATED.3IONS-SCNC: 7 MMOL/L
APTT PPP: 33 SECONDS (ref 23–37)
AST SERPL W P-5'-P-CCNC: 18 U/L (ref 13–39)
AST SERPL W P-5'-P-CCNC: 19 U/L (ref 13–39)
BASOPHILS # BLD AUTO: 0.03 THOUSANDS/ÂΜL (ref 0–0.1)
BASOPHILS NFR BLD AUTO: 0 % (ref 0–1)
BILIRUB SERPL-MCNC: 1.55 MG/DL (ref 0.2–1)
BILIRUB SERPL-MCNC: 1.65 MG/DL (ref 0.2–1)
BNP SERPL-MCNC: 480 PG/ML (ref 0–100)
BUN SERPL-MCNC: 17 MG/DL (ref 5–25)
BUN SERPL-MCNC: 19 MG/DL (ref 5–25)
CALCIUM SERPL-MCNC: 9.1 MG/DL (ref 8.4–10.2)
CALCIUM SERPL-MCNC: 9.4 MG/DL (ref 8.4–10.2)
CHLORIDE SERPL-SCNC: 95 MMOL/L (ref 96–108)
CHLORIDE SERPL-SCNC: 98 MMOL/L (ref 96–108)
CO2 SERPL-SCNC: 27 MMOL/L (ref 21–32)
CO2 SERPL-SCNC: 28 MMOL/L (ref 21–32)
CREAT SERPL-MCNC: 0.93 MG/DL (ref 0.6–1.3)
CREAT SERPL-MCNC: 0.96 MG/DL (ref 0.6–1.3)
DIGOXIN SERPL-MCNC: 1.2 NG/ML (ref 0.8–2)
EOSINOPHIL # BLD AUTO: 0.04 THOUSAND/ÂΜL (ref 0–0.61)
EOSINOPHIL NFR BLD AUTO: 1 % (ref 0–6)
ERYTHROCYTE [DISTWIDTH] IN BLOOD BY AUTOMATED COUNT: 12.8 % (ref 11.6–15.1)
FLUAV RNA RESP QL NAA+PROBE: NEGATIVE
FLUBV RNA RESP QL NAA+PROBE: NEGATIVE
GFR SERPL CREATININE-BSD FRML MDRD: 54 ML/MIN/1.73SQ M
GFR SERPL CREATININE-BSD FRML MDRD: 57 ML/MIN/1.73SQ M
GLUCOSE SERPL-MCNC: 142 MG/DL (ref 65–140)
GLUCOSE SERPL-MCNC: 97 MG/DL (ref 65–140)
HCT VFR BLD AUTO: 38.9 % (ref 34.8–46.1)
HGB BLD-MCNC: 13.3 G/DL (ref 11.5–15.4)
IMM GRANULOCYTES # BLD AUTO: 0.02 THOUSAND/UL (ref 0–0.2)
IMM GRANULOCYTES NFR BLD AUTO: 0 % (ref 0–2)
INR PPP: 1.52 (ref 0.84–1.19)
LYMPHOCYTES # BLD AUTO: 1.18 THOUSANDS/ÂΜL (ref 0.6–4.47)
LYMPHOCYTES NFR BLD AUTO: 17 % (ref 14–44)
MAGNESIUM SERPL-MCNC: 2 MG/DL (ref 1.9–2.7)
MCH RBC QN AUTO: 31.9 PG (ref 26.8–34.3)
MCHC RBC AUTO-ENTMCNC: 34.2 G/DL (ref 31.4–37.4)
MCV RBC AUTO: 93 FL (ref 82–98)
MONOCYTES # BLD AUTO: 0.57 THOUSAND/ÂΜL (ref 0.17–1.22)
MONOCYTES NFR BLD AUTO: 8 % (ref 4–12)
NEUTROPHILS # BLD AUTO: 5.19 THOUSANDS/ÂΜL (ref 1.85–7.62)
NEUTS SEG NFR BLD AUTO: 74 % (ref 43–75)
NRBC BLD AUTO-RTO: 0 /100 WBCS
OSMOLALITY UR/SERPL-RTO: 295 MMOL/KG (ref 282–298)
OSMOLALITY UR: 244 MMOL/KG
PLATELET # BLD AUTO: 244 THOUSANDS/UL (ref 149–390)
PMV BLD AUTO: 10 FL (ref 8.9–12.7)
POTASSIUM SERPL-SCNC: 4.2 MMOL/L (ref 3.5–5.3)
POTASSIUM SERPL-SCNC: 4.3 MMOL/L (ref 3.5–5.3)
PROT SERPL-MCNC: 7 G/DL (ref 6.4–8.4)
PROT SERPL-MCNC: 7.5 G/DL (ref 6.4–8.4)
PROTHROMBIN TIME: 18.5 SECONDS (ref 11.6–14.5)
RBC # BLD AUTO: 4.17 MILLION/UL (ref 3.81–5.12)
RSV RNA RESP QL NAA+PROBE: NEGATIVE
SARS-COV-2 RNA RESP QL NAA+PROBE: NEGATIVE
SODIUM 24H UR-SCNC: 74 MOL/L
SODIUM SERPL-SCNC: 130 MMOL/L (ref 135–147)
SODIUM SERPL-SCNC: 136 MMOL/L (ref 135–147)
WBC # BLD AUTO: 7.03 THOUSAND/UL (ref 4.31–10.16)

## 2024-01-25 PROCEDURE — 83930 ASSAY OF BLOOD OSMOLALITY: CPT | Performed by: STUDENT IN AN ORGANIZED HEALTH CARE EDUCATION/TRAINING PROGRAM

## 2024-01-25 PROCEDURE — 84300 ASSAY OF URINE SODIUM: CPT | Performed by: STUDENT IN AN ORGANIZED HEALTH CARE EDUCATION/TRAINING PROGRAM

## 2024-01-25 PROCEDURE — 83735 ASSAY OF MAGNESIUM: CPT | Performed by: NURSE PRACTITIONER

## 2024-01-25 PROCEDURE — 80162 ASSAY OF DIGOXIN TOTAL: CPT | Performed by: NURSE PRACTITIONER

## 2024-01-25 PROCEDURE — 99291 CRITICAL CARE FIRST HOUR: CPT | Performed by: EMERGENCY MEDICINE

## 2024-01-25 PROCEDURE — G1004 CDSM NDSC: HCPCS

## 2024-01-25 PROCEDURE — 99232 SBSQ HOSP IP/OBS MODERATE 35: CPT | Performed by: SURGERY

## 2024-01-25 PROCEDURE — 99291 CRITICAL CARE FIRST HOUR: CPT | Performed by: STUDENT IN AN ORGANIZED HEALTH CARE EDUCATION/TRAINING PROGRAM

## 2024-01-25 PROCEDURE — 85730 THROMBOPLASTIN TIME PARTIAL: CPT | Performed by: PHYSICIAN ASSISTANT

## 2024-01-25 PROCEDURE — 74177 CT ABD & PELVIS W/CONTRAST: CPT

## 2024-01-25 PROCEDURE — 96360 HYDRATION IV INFUSION INIT: CPT

## 2024-01-25 PROCEDURE — 99285 EMERGENCY DEPT VISIT HI MDM: CPT

## 2024-01-25 PROCEDURE — 0241U HB NFCT DS VIR RESP RNA 4 TRGT: CPT | Performed by: PHYSICIAN ASSISTANT

## 2024-01-25 PROCEDURE — 80053 COMPREHEN METABOLIC PANEL: CPT | Performed by: PHYSICIAN ASSISTANT

## 2024-01-25 PROCEDURE — 36415 COLL VENOUS BLD VENIPUNCTURE: CPT | Performed by: PHYSICIAN ASSISTANT

## 2024-01-25 PROCEDURE — 85025 COMPLETE CBC W/AUTO DIFF WBC: CPT | Performed by: PHYSICIAN ASSISTANT

## 2024-01-25 PROCEDURE — 80053 COMPREHEN METABOLIC PANEL: CPT

## 2024-01-25 PROCEDURE — 99284 EMERGENCY DEPT VISIT MOD MDM: CPT

## 2024-01-25 PROCEDURE — 71260 CT THORAX DX C+: CPT

## 2024-01-25 PROCEDURE — 83935 ASSAY OF URINE OSMOLALITY: CPT | Performed by: STUDENT IN AN ORGANIZED HEALTH CARE EDUCATION/TRAINING PROGRAM

## 2024-01-25 PROCEDURE — 70450 CT HEAD/BRAIN W/O DYE: CPT

## 2024-01-25 PROCEDURE — 85610 PROTHROMBIN TIME: CPT | Performed by: PHYSICIAN ASSISTANT

## 2024-01-25 PROCEDURE — 83880 ASSAY OF NATRIURETIC PEPTIDE: CPT | Performed by: NURSE PRACTITIONER

## 2024-01-25 RX ORDER — ACETAMINOPHEN 325 MG/1
975 TABLET ORAL EVERY 8 HOURS SCHEDULED
Status: DISCONTINUED | OUTPATIENT
Start: 2024-01-25 | End: 2024-01-30 | Stop reason: HOSPADM

## 2024-01-25 RX ORDER — OXYCODONE HYDROCHLORIDE 5 MG/1
5 TABLET ORAL EVERY 4 HOURS PRN
Status: DISCONTINUED | OUTPATIENT
Start: 2024-01-25 | End: 2024-01-30 | Stop reason: HOSPADM

## 2024-01-25 RX ORDER — AMOXICILLIN 250 MG
1 CAPSULE ORAL
Status: DISCONTINUED | OUTPATIENT
Start: 2024-01-25 | End: 2024-01-30 | Stop reason: HOSPADM

## 2024-01-25 RX ORDER — FUROSEMIDE 20 MG/1
20 TABLET ORAL EVERY OTHER DAY
Status: DISCONTINUED | OUTPATIENT
Start: 2024-01-25 | End: 2024-01-30 | Stop reason: HOSPADM

## 2024-01-25 RX ORDER — ATORVASTATIN CALCIUM 10 MG/1
10 TABLET, FILM COATED ORAL DAILY
Status: DISCONTINUED | OUTPATIENT
Start: 2024-01-25 | End: 2024-01-30 | Stop reason: HOSPADM

## 2024-01-25 RX ORDER — LOSARTAN POTASSIUM 50 MG/1
50 TABLET ORAL DAILY
Status: DISCONTINUED | OUTPATIENT
Start: 2024-01-25 | End: 2024-01-30 | Stop reason: HOSPADM

## 2024-01-25 RX ORDER — SPIRONOLACTONE 25 MG/1
12.5 TABLET ORAL EVERY OTHER DAY
Status: DISCONTINUED | OUTPATIENT
Start: 2024-01-26 | End: 2024-01-30 | Stop reason: HOSPADM

## 2024-01-25 RX ORDER — DIGOXIN 125 MCG
125 TABLET ORAL
Status: DISCONTINUED | OUTPATIENT
Start: 2024-01-25 | End: 2024-01-30 | Stop reason: HOSPADM

## 2024-01-25 RX ORDER — LIDOCAINE 50 MG/G
1 PATCH TOPICAL DAILY
Status: DISCONTINUED | OUTPATIENT
Start: 2024-01-25 | End: 2024-01-30 | Stop reason: HOSPADM

## 2024-01-25 RX ORDER — HYDROMORPHONE HCL IN WATER/PF 6 MG/30 ML
0.2 PATIENT CONTROLLED ANALGESIA SYRINGE INTRAVENOUS EVERY 2 HOUR PRN
Status: DISCONTINUED | OUTPATIENT
Start: 2024-01-25 | End: 2024-01-30 | Stop reason: HOSPADM

## 2024-01-25 RX ORDER — ONDANSETRON 2 MG/ML
4 INJECTION INTRAMUSCULAR; INTRAVENOUS EVERY 4 HOURS PRN
Status: DISCONTINUED | OUTPATIENT
Start: 2024-01-25 | End: 2024-01-30 | Stop reason: HOSPADM

## 2024-01-25 RX ORDER — ENOXAPARIN SODIUM 100 MG/ML
30 INJECTION SUBCUTANEOUS EVERY 24 HOURS
Status: DISCONTINUED | OUTPATIENT
Start: 2024-01-25 | End: 2024-01-26

## 2024-01-25 RX ORDER — GABAPENTIN 100 MG/1
100 CAPSULE ORAL
Status: DISCONTINUED | OUTPATIENT
Start: 2024-01-25 | End: 2024-01-30 | Stop reason: HOSPADM

## 2024-01-25 RX ORDER — LIDOCAINE 50 MG/G
1 PATCH TOPICAL ONCE
Status: DISCONTINUED | OUTPATIENT
Start: 2024-01-25 | End: 2024-01-25 | Stop reason: HOSPADM

## 2024-01-25 RX ORDER — METOPROLOL SUCCINATE 25 MG/1
25 TABLET, EXTENDED RELEASE ORAL 3 TIMES DAILY
Status: DISCONTINUED | OUTPATIENT
Start: 2024-01-25 | End: 2024-01-30 | Stop reason: HOSPADM

## 2024-01-25 RX ORDER — METOPROLOL TARTRATE 50 MG/1
100 TABLET, FILM COATED ORAL EVERY 12 HOURS SCHEDULED
Status: DISCONTINUED | OUTPATIENT
Start: 2024-01-25 | End: 2024-01-30 | Stop reason: HOSPADM

## 2024-01-25 RX ADMIN — METOPROLOL SUCCINATE 25 MG: 25 TABLET, EXTENDED RELEASE ORAL at 17:39

## 2024-01-25 RX ADMIN — FUROSEMIDE 20 MG: 20 TABLET ORAL at 17:20

## 2024-01-25 RX ADMIN — LIDOCAINE 5% 1 PATCH: 700 PATCH TOPICAL at 15:24

## 2024-01-25 RX ADMIN — DIGOXIN 125 MCG: 125 TABLET ORAL at 17:20

## 2024-01-25 RX ADMIN — ACETAMINOPHEN 975 MG: 325 TABLET, FILM COATED ORAL at 15:15

## 2024-01-25 RX ADMIN — ATORVASTATIN CALCIUM 10 MG: 10 TABLET, FILM COATED ORAL at 17:20

## 2024-01-25 RX ADMIN — ACETAMINOPHEN 975 MG: 325 TABLET, FILM COATED ORAL at 22:30

## 2024-01-25 RX ADMIN — SENNOSIDES, DOCUSATE SODIUM 1 TABLET: 8.6; 5 TABLET ORAL at 22:30

## 2024-01-25 RX ADMIN — LIDOCAINE 5% 1 PATCH: 700 PATCH TOPICAL at 09:53

## 2024-01-25 RX ADMIN — METOPROLOL TARTRATE 100 MG: 50 TABLET ORAL at 22:30

## 2024-01-25 RX ADMIN — ENOXAPARIN SODIUM 30 MG: 30 INJECTION SUBCUTANEOUS at 17:21

## 2024-01-25 RX ADMIN — METOPROLOL SUCCINATE 25 MG: 25 TABLET, EXTENDED RELEASE ORAL at 22:30

## 2024-01-25 RX ADMIN — SODIUM CHLORIDE 500 ML: 0.9 INJECTION, SOLUTION INTRAVENOUS at 09:18

## 2024-01-25 RX ADMIN — IOHEXOL 85 ML: 350 INJECTION, SOLUTION INTRAVENOUS at 10:10

## 2024-01-25 RX ADMIN — GABAPENTIN 100 MG: 100 CAPSULE ORAL at 22:30

## 2024-01-25 RX ADMIN — LOSARTAN POTASSIUM 50 MG: 50 TABLET, FILM COATED ORAL at 17:20

## 2024-01-25 NOTE — ED PROVIDER NOTES
History  Chief Complaint   Patient presents with    Back Pain     Pt fell back yest hit tub has r sided mid back pain pt on xarelto. Denies hitting head     84 y/o female, on Xarelto, presenting with a fall that occurred yesterday morning, states that she was walking out of her bathroom when she tripped over her rug falling backwards landing the right flank.  States that she was able to get up afterwards without any difficulty and ambulate.  Does not typically use any walking aids.  States that as the day went on she had worsening pain.  Patient did not strike her head or lose consciousness.  States that she has no pain at rest however when ambulating now and with twisting and movement becomes more severe.  Denies chest or abdominal pain, numbness, paresthesias.  Denies open injuries.  Differential clues but is not limited to rib injury, pulmonary contusion etc.        Prior to Admission Medications   Prescriptions Last Dose Informant Patient Reported? Taking?   atorvastatin (LIPITOR) 10 mg tablet   No No   Sig: Take 1 tablet (10 mg total) by mouth daily   digoxin (LANOXIN) 0.125 mg tablet   No No   Sig: Take 1 tablet (125 mcg total) by mouth 4 (four) times a week Advised patient to take 5 times weekly   furosemide (LASIX) 20 mg tablet   No No   Sig: Take 1 tablet (20 mg total) by mouth every other day   losartan (COZAAR) 50 mg tablet   No No   Sig: Take 1 tablet (50 mg total) by mouth daily   metoprolol succinate (TOPROL-XL) 25 mg 24 hr tablet   No No   Sig: Take 1 tablet (25 mg total) by mouth 3 (three) times a day   metoprolol tartrate (LOPRESSOR) 100 mg tablet   No No   Sig: Take 1 tablet (100 mg total) by mouth every 12 (twelve) hours   rivaroxaban (Xarelto) 15 mg tablet   No No   Sig: Take 1 tablet (15 mg total) by mouth every evening   spironolactone (ALDACTONE) 25 mg tablet   No No   Sig: Take 0.5 tablets (12.5 mg total) by mouth every other day      Facility-Administered Medications: None       Past Medical  History:   Diagnosis Date    Atrial fibrillation (HCC)     Hypercholesteremia        Past Surgical History:   Procedure Laterality Date    EMBOLIZATION      HERNIA REPAIR         No family history on file.  I have reviewed and agree with the history as documented.    E-Cigarette/Vaping    E-Cigarette Use Never User      E-Cigarette/Vaping Substances    Nicotine No     THC No     CBD No     Flavoring No     Other No     Unknown No      Social History     Tobacco Use    Smoking status: Former     Current packs/day: 0.00     Types: Cigarettes     Quit date:      Years since quittin.0    Smokeless tobacco: Never   Vaping Use    Vaping status: Never Used   Substance Use Topics    Alcohol use: Yes     Alcohol/week: 7.0 standard drinks of alcohol     Types: 7 Standard drinks or equivalent per week     Comment: one glass wine once a week    Drug use: Not Currently       Review of Systems   Constitutional: Negative.  Negative for chills, fatigue and fever.   HENT: Negative.  Negative for congestion, postnasal drip, rhinorrhea and sore throat.    Eyes: Negative.    Respiratory: Negative.  Negative for cough, shortness of breath and wheezing.    Cardiovascular: Negative.    Gastrointestinal: Negative.  Negative for abdominal pain, diarrhea, nausea and vomiting.   Endocrine: Negative.    Genitourinary: Negative.    Musculoskeletal:  Positive for back pain. Negative for arthralgias, gait problem, joint swelling, myalgias, neck pain and neck stiffness.   Skin: Negative.    Neurological: Negative.    Hematological: Negative.    Psychiatric/Behavioral: Negative.     All other systems reviewed and are negative.      Physical Exam  Physical Exam  Vitals and nursing note reviewed.   Constitutional:       General: She is not in acute distress.     Appearance: Normal appearance. She is well-developed and normal weight. She is not diaphoretic.   HENT:      Head: Normocephalic and atraumatic.      Right Ear: External ear normal.       Left Ear: External ear normal.      Nose: Nose normal.      Mouth/Throat:      Pharynx: No oropharyngeal exudate.   Eyes:      General: No scleral icterus.        Right eye: No discharge.         Left eye: No discharge.      Conjunctiva/sclera: Conjunctivae normal.      Pupils: Pupils are equal, round, and reactive to light.   Cardiovascular:      Rate and Rhythm: Normal rate and regular rhythm.      Pulses: Normal pulses.      Heart sounds: Normal heart sounds. No murmur heard.     No friction rub. No gallop.   Pulmonary:      Effort: Pulmonary effort is normal. No respiratory distress.      Breath sounds: Normal breath sounds. No stridor. No wheezing, rhonchi or rales.   Chest:      Chest wall: No tenderness.   Abdominal:      General: Abdomen is flat. Bowel sounds are normal. There is no distension.      Palpations: Abdomen is soft. There is no mass.      Tenderness: There is no abdominal tenderness. There is no right CVA tenderness, left CVA tenderness, guarding or rebound.      Hernia: No hernia is present.   Musculoskeletal:        Arms:       Cervical back: Normal range of motion and neck supple.   Lymphadenopathy:      Cervical: No cervical adenopathy.   Skin:     General: Skin is warm and dry.      Capillary Refill: Capillary refill takes less than 2 seconds.      Coloration: Skin is not pale.      Findings: No erythema or rash.   Neurological:      General: No focal deficit present.      Mental Status: She is alert and oriented to person, place, and time. Mental status is at baseline.   Psychiatric:         Mood and Affect: Mood normal.         Behavior: Behavior normal.         Thought Content: Thought content normal.         Judgment: Judgment normal.         Vital Signs  ED Triage Vitals   Temperature Pulse Respirations Blood Pressure SpO2   01/25/24 0850 01/25/24 0850 01/25/24 0850 01/25/24 0850 01/25/24 0855   97.5 °F (36.4 °C) 69 20 (!) 215/118 92 %      Temp Source Heart Rate Source Patient  Position - Orthostatic VS BP Location FiO2 (%)   01/25/24 0850 01/25/24 0850 01/25/24 1158 01/25/24 1158 --   Oral Monitor Lying Right arm       Pain Score       --                  Vitals:    01/25/24 1000 01/25/24 1100 01/25/24 1130 01/25/24 1158   BP: (!) 185/99 168/87 (!) 208/95 (!) 173/98   Pulse:  90 90 87   Patient Position - Orthostatic VS:    Lying         Visual Acuity      ED Medications  Medications   sodium chloride 0.9 % bolus 500 mL (0 mL Intravenous Stopped 1/25/24 0953)   iohexol (OMNIPAQUE) 350 MG/ML injection (MULTI-DOSE) 85 mL (85 mL Intravenous Given 1/25/24 1010)       Diagnostic Studies  Results Reviewed       Procedure Component Value Units Date/Time    FLU/RSV/COVID - if FLU/RSV clinically relevant [[id number]]  (Normal) Collected: 01/25/24 1111    Lab Status: Final result Specimen: Nares from Nose Updated: 01/25/24 1153     SARS-CoV-2 Negative     INFLUENZA A PCR Negative     INFLUENZA B PCR Negative     RSV PCR Negative    Narrative:      FOR PEDIATRIC PATIENTS - copy/paste COVID Guidelines URL to browser: [email or web address]    SARS-CoV-2 assay is a Nucleic Acid Amplification assay intended for the  qualitative detection of nucleic acid from SARS-CoV-2 in nasopharyngeal  swabs. Results are for the presumptive identification of SARS-CoV-2 RNA.    Positive results are indicative of infection with SARS-CoV-2, the virus  causing COVID-19, but do not rule out bacterial infection or co-infection  with other viruses. Laboratories within the [place] and its  territories are required to report all positive results to the appropriate  public health authorities. Negative results do not preclude SARS-CoV-2  infection and should not be used as the sole basis for treatment or other  patient management decisions. Negative results must be combined with  clinical observations, patient history, and epidemiological information.  This test has not  been FDA cleared or approved.    This test has been authorized by FDA under an Emergency Use Authorization  (EUA). This test is only authorized for the duration of time the  declaration that circumstances exist justifying the authorization of the  emergency use of an in vitro diagnostic tests for detection of SARS-CoV-2  virus and/or diagnosis of COVID-19 infection under section 564(b)(1) of  the Act, 21 U.S.C. 360bbb-3(b)(1), unless the authorization is terminated  or revoked sooner. The test has been validated but independent review by FDA  and CLIA is pending.    Test performed using [email or web address] GeneXpert: This RT-PCR assay targets N2,  a region unique to SARS-CoV-2. A conserved region in the E-gene was chosen  for pan-Sarbecovirus detection which includes SARS-CoV-2.    According to CMS-2020-01-R, this platform meets the definition of high-throughput technology.    Comprehensive metabolic panel [[id number]]  (Abnormal) Collected: 01/25/24 0920    Lab Status: Final result Specimen: Blood from Arm, Left Updated: 01/25/24 0953     Sodium 130 mmol/L      Potassium 4.3 mmol/L      Chloride 95 mmol/L      CO2 28 mmol/L      ANION GAP 7 mmol/L      BUN 19 mg/dL      Creatinine 0.96 mg/dL      Glucose 142 mg/dL      Calcium 9.4 mg/dL      AST 18 U/L      ALT 10 U/L      Alkaline Phosphatase 65 U/L      Total Protein 7.5 g/dL      Albumin 4.1 g/dL      Total Bilirubin 1.65 mg/dL      eGFR 54 ml/min/1.73sq m     Narrative:      National Kidney Disease Foundation guidelines for Chronic Kidney Disease (CKD):     Stage 1 with normal or high GFR (GFR > 90 mL/min/1.73 square meters)    Stage 2 Mild CKD (GFR = 60-89 mL/min/1.73 square meters)    Stage 3A Moderate CKD (GFR = 45-59 mL/min/1.73 square meters)    Stage 3B Moderate CKD (GFR = 30-44 mL/min/1.73 square meters)    Stage 4 Severe CKD (GFR = 15-29 mL/min/1.73 square meters)    Stage 5 End Stage CKD (GFR <15 mL/min/1.73 square meters)  Note: GFR calculation is accurate only  with a steady state creatinine    Protime-INR [[id number]]  (Abnormal) Collected: 01/25/24 0916    Lab Status: Final result Specimen: Blood from Arm, Left Updated: 01/25/24 0942     Protime 18.5 seconds      INR 1.52    APTT [[id number]]  (Normal) Collected: 01/25/24 0916    Lab Status: Final result Specimen: Blood from Arm, Left Updated: 01/25/24 0942     PTT 33 seconds     CBC and differential [[id number]] Collected: 01/25/24 0916    Lab Status: Final result Specimen: Blood from Arm, Left Updated: 01/25/24 0927     WBC 7.03 Thousand/uL      RBC 4.17 Million/uL      Hemoglobin 13.3 g/dL      Hematocrit 38.9 %      MCV 93 fL      MCH 31.9 pg      MCHC 34.2 g/dL      RDW 12.8 %      MPV 10.0 fL      Platelets 244 Thousands/uL      nRBC 0 /100 WBCs      Neutrophils Relative 74 %      Immat GRANS % 0 %      Lymphocytes Relative 17 %      Monocytes Relative 8 %      Eosinophils Relative 1 %      Basophils Relative 0 %      Neutrophils Absolute 5.19 Thousands/µL      Immature Grans Absolute 0.02 Thousand/uL      Lymphocytes Absolute 1.18 Thousands/µL      Monocytes Absolute 0.57 Thousand/µL      Eosinophils Absolute 0.04 Thousand/µL      Basophils Absolute 0.03 Thousands/µL                    CT head without contrast   Final Result by Kali Beauchamp MD (01/25 1059)      No acute intracranial abnormality.                  Workstation performed: [id number]         CT chest abdomen pelvis w contrast   Final Result by Kali Beauchamp MD (01/25 4769)      1.  Acute minimally displaced fractures of right-sided ribs 7 through 11.   2.  Small right pleural effusion.   3.  Incidental thyroid nodule(s) for which nonemergent thyroid ultrasound is recommended.         The study was marked in EPIC for immediate notification.      Workstation performed: [id number]                    Procedures  CriticalCare Time    Date/Time: 1/25/2024 12:50 PM    Performed by: Alma Heath PA-C  Authorized by: Alma Heath PA-C    [facility]  care provider statement:     Critical care time (minutes):  45    Critical care time was exclusive of:  Separately billable procedures and treating other patients and teaching time    Critical care was necessary to treat or prevent imminent or life-threatening deterioration of the following conditions:  Trauma    Critical care was time spent personally by me on the following activities:  Blood draw for specimens, obtaining history from patient or surrogate, development of treatment plan with patient or surrogate, discussions with consultants, evaluation of patient's response to treatment, examination of patient, ordering and performing treatments and interventions, ordering and review of laboratory studies, ordering and review of radiographic studies, re-evaluation of patient's condition and review of old charts    I assumed direction of critical care for this patient from another provider in my specialty: yes             ED Course  ED Course as of 01/25/24 1250   Thu Jan 25, 2024   1050 1.  Acute minimally displaced fractures of right-sided ribs 7 through 11.  2.  Small right pleural effusion.     1058 SpO2: 92 %  95% on RA   1108 Patient was accepted to trauma by Dr. Braxton.  Patient agrees to be transferred at this point in time. Discussed with attending Dr. Breen.                                              Medical Decision Making  Patient resting comfortably no distress, good oxygenation on room air at 96%.  Patient has 5 right-sided rib fractures with some displacement no signs of flail chest no crepitus.  Discussed with patient the importance of transfer to [place] for trauma evaluation.  Patient was accepted to trauma by Dr. Braxton.     Amount and/or Complexity of Data Reviewed  Labs: ordered.  Radiology: ordered.    Risk  Prescription drug management.             Disposition  Final diagnoses:   Fall, initial encounter   Multiple rib fractures   Hyperglycemia   Hyponatremia     Time reflects when diagnosis  was documented in both MDM as applicable and the Disposition within this note       Time User Action Codes Description Comment    1/25/2024 11:00 AM Alma Heath [W19.XXXA] Fall, initial encounter     1/25/2024 11:00 AM Alma Heath [S22.49XA] Multiple rib fractures     1/25/2024 11:10 AM Alma Heath [R73.9] Hyperglycemia     1/25/2024 11:10 AM Alma Heath [E87.1] Hyponatremia           ED Disposition       ED Disposition   Transfer to Another Facility-In Network    Condition   --    Date/Time   Thu Jan 25, 2024 11:00 AM    Comment   Moriah Arreola should be transferred out to [facility].               MD Documentation      Flowsheet Row Most Recent Value   Patient Condition The patient has been stabilized such that within reasonable medical probability, no material deterioration of the patient condition or the condition of the unborn child(nighat) is likely to result from the transfer   Reason for Transfer Level of Care needed not available at this facility  [needs trauma evaluation]   Benefits of Transfer Specialized equipment and/or services available at the receiving facility (Include comment)________________________  [trauma eval]   Risks of Transfer Potential for delay in receiving treatment, Potential deterioration of medical condition, Loss of IV, Increased discomfort during transfer, Possible worsening of condition or death during transfer, Other: (Include comment)__________________________   Accepting Physician Dr. Braxton   Accepting Facility Name, City & State  [place]   Sending Ivana Castaneda Dr. PAC   Provider Certification General risk, such as traffic hazards, adverse weather conditions, rough terrain or turbulence, possible failure of equipment (including vehicle or aircraft), or consequences of actions of persons outside the control of the transport personnel, Unanticipated needs of medical equipment and personnel during transport, Risk of worsening  condition, The possibility of a transport vehicle being unavailable          RN Documentation      Flowsheet Row Most Recent Value   Accepting Facility Name, City & State  [place]   Report Given to SUSAN Monreal          Follow-up Information    None         Discharge Medication List as of 1/25/2024 12:00 PM        CONTINUE these medications which have NOT CHANGED    Details   atorvastatin (LIPITOR) 10 mg tablet Take 1 tablet (10 mg total) by mouth daily, Starting Mon 6/5/2023, Normal      digoxin (LANOXIN) 0.125 mg tablet Take 1 tablet (125 mcg total) by mouth 4 (four) times a week Advised patient to take 5 times weekly, Starting Tue 11/28/2023, Normal      furosemide (LASIX) 20 mg tablet Take 1 tablet (20 mg total) by mouth every other day, Starting Wed 5/24/2023, Normal      losartan (COZAAR) 50 mg tablet Take 1 tablet (50 mg total) by mouth daily, Starting Fri 8/25/2023, Normal      metoprolol succinate (TOPROL-XL) 25 mg 24 hr tablet Take 1 tablet (25 mg total) by mouth 3 (three) times a day, Starting Fri 8/25/2023, Normal      metoprolol tartrate (LOPRESSOR) 100 mg tablet Take 1 tablet (100 mg total) by mouth every 12 (twelve) hours, Starting Mon 4/3/2023, Until Wed 12/6/2023, Normal      rivaroxaban (Xarelto) 15 mg tablet Take 1 tablet (15 mg total) by mouth every evening, Starting Wed 12/13/2023, Normal      spironolactone (ALDACTONE) 25 mg tablet Take 0.5 tablets (12.5 mg total) by mouth every other day, Starting Fri 8/25/2023, Until Sat 2/15/2025, Normal             No discharge procedures on file.    PDMP Review       None            ED Provider  Electronically Signed by             Alma Heath PA-C  01/25/24 0420       Alma Heath PA-C  01/25/24 4121

## 2024-01-25 NOTE — EMTALA/ACUTE CARE TRANSFER
[facility] EMERGENCY DEPARTMENT  [address] [facility] [address]  Dept: [phone]      EMTALA TRANSFER CONSENT    NAME Moriah Arreola                                         1940                              MRN [id number]    I have been informed of my rights regarding examination, treatment, and transfer   by Dr. Jose R Breen MD    Benefits: Specialized equipment and/or services available at the receiving facility (Include comment)________________________ (trauma eval)    Risks: Potential for delay in receiving treatment, Potential deterioration of medical condition, Loss of IV, Increased discomfort during transfer, Possible worsening of condition or death during transfer, Other: (Include comment)__________________________      Consent for Transfer:  I acknowledge that my medical condition has been evaluated and explained to me by the emergency department physician or other qualified medical person and/or my attending physician, who has recommended that I be transferred to the service of  Accepting Physician: Dr. Braxton at Accepting Facility Name, City & State : [place]. The above potential benefits of such transfer, the potential risks associated with such transfer, and the probable risks of not being transferred have been explained to me, and I fully understand them.  The doctor has explained that, in my case, the benefits of transfer outweigh the risks.  I agree to be transferred.    I authorize the performance of emergency medical procedures and treatments upon me in both transit and upon arrival at the receiving facility.  Additionally, I authorize the release of any and all medical records to the receiving facility and request they be transported with me, if possible.  I understand that the safest mode of transportation during a medical emergency is an ambulance and that the Hospital advocates the use of this mode of transport. Risks of traveling to the receiving  facility by car, including absence of medical control, life sustaining equipment, such as oxygen, and medical personnel has been explained to me and I fully understand them.    (ELISA CORRECT BOX BELOW)  [  ]  I consent to the stated transfer and to be transported by ambulance/helicopter.  [  ]  I consent to the stated transfer, but refuse transportation by ambulance and accept full responsibility for my transportation by car.  I understand the risks of non-ambulance transfers and I exonerate the Hospital and its staff from any deterioration in my condition that results from this refusal.    X___________________________________________    DATE  24  TIME________  Signature of patient or legally responsible individual signing on patient behalf           RELATIONSHIP TO PATIENT_________________________          Provider Certification    NAME Moriah Arreola                                        [facility] 1940                              MRN [id number]    A medical screening exam was performed on the above named patient.  Based on the examination:    Condition Necessitating Transfer The primary encounter diagnosis was Fall, initial encounter. A diagnosis of Multiple rib fractures was also pertinent to this visit.    Patient Condition: The patient has been stabilized such that within reasonable medical probability, no material deterioration of the patient condition or the condition of the unborn child(nighat) is likely to result from the transfer    Reason for Transfer: Level of Care needed not available at this facility (needs trauma evaluation)    Transfer Requirements: [facility]   Space available and qualified personnel available for treatment as acknowledged by    Agreed to accept transfer and to provide appropriate medical treatment as acknowledged by       Dr. Braxton  Appropriate medical records of the examination and treatment of the patient are provided at the time of transfer   STAFF INITIAL WHEN  COMPLETED _______  Transfer will be performed by qualified personnel from    and appropriate transfer equipment as required, including the use of necessary and appropriate life support measures.    Provider Certification: I have examined the patient and explained the following risks and benefits of being transferred/refusing transfer to the patient/family:  General risk, such as traffic hazards, adverse weather conditions, rough terrain or turbulence, possible failure of equipment (including vehicle or aircraft), or consequences of actions of persons outside the control of the transport personnel, Unanticipated needs of medical equipment and personnel during transport, Risk of worsening condition, The possibility of a transport vehicle being unavailable      Based on these reasonable risks and benefits to the patient and/or the unborn child(nighat), and based upon the information available at the time of the patient’s examination, I certify that the medical benefits reasonably to be expected from the provision of appropriate medical treatments at another medical facility outweigh the increasing risks, if any, to the individual’s medical condition, and in the case of labor to the unborn child, from effecting the transfer.    X____________________________________________ DATE 01/25/24        TIME_______      ORIGINAL - SEND TO MEDICAL RECORDS   COPY - SEND WITH PATIENT DURING TRANSFER

## 2024-01-25 NOTE — PROGRESS NOTES
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 female who presents on 1/25 following a mechanical fall in her bathroom. She has a past medical history of combined heart failure with an ejection fraction of 30% previously declining an ICD, atrial fibrillation on Xarelto, hypertension, hyperlipidemia, stage 3 CKD. She denies any loss of consciouness. Her imaging was notable for right sided rib fractures and a right pleural effusion. She is being referred to the stepdown unit for admission.    History obtained from chart review and the patient.  -------------------------------------------------------------------------------------------------------------  Dispo: Admit to Stepdown Level 1    Code Status: Level 1 - Full Code  --------------------------------------------------------------------------------------------------------------  Review of Systems   Constitutional:  Negative for diaphoresis and fatigue.   HENT:  Negative for trouble swallowing.     Respiratory:  Negative for shortness of breath.    Cardiovascular:  Positive for chest pain (right-sided).   Gastrointestinal:  Negative for abdominal pain, nausea and vomiting.   Musculoskeletal:  Negative for arthralgias and gait problem.   Neurological:  Negative for syncope and headaches.       A 12-point, complete review of systems was reviewed and negative except as stated above     Physical Exam  Constitutional:       General: She is not in acute distress.     Appearance: Normal appearance.   HENT:      Head: Normocephalic and atraumatic.      Mouth/Throat:      Mouth: Mucous membranes are moist.   Eyes:      Pupils: Pupils are equal, round, and reactive to light.   Cardiovascular:      Rate and Rhythm: Tachycardia present. Rhythm irregular.      Pulses: Normal pulses.   Pulmonary:      Effort: Pulmonary effort is normal.   Chest:      Chest wall: Tenderness (right lower chest wall) present.   Abdominal:      General: Abdomen is flat. There is no distension.      Palpations: Abdomen is soft.      Tenderness: There is no abdominal tenderness.   Musculoskeletal:         General: No tenderness or signs of injury.      Right lower leg: No edema.      Left lower leg: No edema.   Skin:     General: Skin is warm and dry.   Neurological:      Mental Status: She is alert.      GCS: GCS eye subscore is 4. GCS verbal subscore is 5. GCS motor subscore is 6.       --------------------------------------------------------------------------------------------------------------  Vitals:   Vitals:    01/25/24 1500 01/25/24 1600 01/25/24 1700 01/25/24 1720   BP: (!) 194/116 153/86 140/97    BP Location: Right arm Right arm Right arm    Pulse: 96 80 81 105   Resp: 22 22 22    Temp:       TempSrc:       SpO2: 95% 96% 96%      Temp  Min: 97.5 °F (36.4 °C)  Max: 98 °F (36.7 °C)        There is no height or weight on file to calculate BMI.    Laboratory and Diagnostics:  Results from last 7 days   Lab Units 01/25/24  0916   WBC  Thousand/uL 7.03   HEMOGLOBIN g/dL 13.3   HEMATOCRIT % 38.9   PLATELETS Thousands/uL 244   NEUTROS PCT % 74   MONOS PCT % 8   EOS PCT % 1     Results from last 7 days   Lab Units 24  0920   SODIUM mmol/L 130*   POTASSIUM mmol/L 4.3   CHLORIDE mmol/L 95*   CO2 mmol/L 28   ANION GAP mmol/L 7   BUN mg/dL 19   CREATININE mg/dL 0.96   CALCIUM mg/dL 9.4   GLUCOSE RANDOM mg/dL 142*   ALT U/L 10   AST U/L 18   ALK PHOS U/L 65   ALBUMIN g/dL 4.1   TOTAL BILIRUBIN mg/dL 1.65*          Results from last 7 days   Lab Units 24  0916   INR  1.52*   PTT seconds 33              ABG:    VBG:          Micro:        EKG: Atrial fibrillation with rapid ventricular response  Imaging: I have personally reviewed pertinent reports.   and I have personally reviewed pertinent films in PACS      Historical Information   Past Medical History:   Diagnosis Date    Atrial fibrillation (HCC)     Hypercholesteremia      Past Surgical History:   Procedure Laterality Date    EMBOLIZATION      HERNIA REPAIR       Social History   Social History     Substance and Sexual Activity   Alcohol Use Yes    Alcohol/week: 7.0 standard drinks of alcohol    Types: 7 Standard drinks or equivalent per week    Comment: one glass wine once a week     Social History     Substance and Sexual Activity   Drug Use Not Currently     Social History     Tobacco Use   Smoking Status Former    Current packs/day: 0.00    Types: Cigarettes    Quit date:     Years since quittin.0   Smokeless Tobacco Never     Exercise History: Independent in ADLs  Family History:   History reviewed. No pertinent family history.  I have reviewed this patient's family history and commented on sigificant items within the HPI      Medications:  Current Facility-Administered Medications   Medication Dose Route Frequency    acetaminophen (TYLENOL) tablet 975 mg  975 mg Oral Q8H [facility]    atorvastatin (LIPITOR) tablet 10 mg  10 mg Oral Daily    digoxin (LANOXIN) tablet 125 mcg  125 mcg  Oral Once per day on Sunday Tuesday Thursday Saturday    enoxaparin (LOVENOX) subcutaneous injection 30 mg  30 mg Subcutaneous Q24H    furosemide (LASIX) tablet 20 mg  20 mg Oral Every Other Day    gabapentin (NEURONTIN) capsule 100 mg  100 mg Oral HS    HYDROmorphone HCl (DILAUDID) injection 0.2 mg  0.2 mg Intravenous Q2H PRN    lidocaine (LIDODERM) 5 % patch 1 patch  1 patch Topical Daily    losartan (COZAAR) tablet 50 mg  50 mg Oral Daily    metoprolol succinate (TOPROL-XL) 24 hr tablet 25 mg  25 mg Oral TID    metoprolol tartrate (LOPRESSOR) tablet 100 mg  100 mg Oral Q12H DEVEN    ondansetron (ZOFRAN) injection 4 mg  4 mg Intravenous Q4H PRN    oxyCODONE (ROXICODONE) split tablet 2.5 mg  2.5 mg Oral Q4H PRN    Or    oxyCODONE (ROXICODONE) IR tablet 5 mg  5 mg Oral Q4H PRN    senna-docusate sodium (SENOKOT S) 8.6-50 mg per tablet 1 tablet  1 tablet Oral HS    [START ON 1/26/2024] spironolactone (ALDACTONE) tablet 12.5 mg  12.5 mg Oral Every Other Day     Home medications:  Prior to Admission Medications   Prescriptions Last Dose Informant Patient Reported? Taking?   atorvastatin (LIPITOR) 10 mg tablet   No No   Sig: Take 1 tablet (10 mg total) by mouth daily   digoxin (LANOXIN) 0.125 mg tablet   No No   Sig: Take 1 tablet (125 mcg total) by mouth 4 (four) times a week Advised patient to take 5 times weekly   furosemide (LASIX) 20 mg tablet   No No   Sig: Take 1 tablet (20 mg total) by mouth every other day   losartan (COZAAR) 50 mg tablet   No No   Sig: Take 1 tablet (50 mg total) by mouth daily   metoprolol succinate (TOPROL-XL) 25 mg 24 hr tablet   No No   Sig: Take 1 tablet (25 mg total) by mouth 3 (three) times a day   metoprolol tartrate (LOPRESSOR) 100 mg tablet   No No   Sig: Take 1 tablet (100 mg total) by mouth every 12 (twelve) hours   rivaroxaban (Xarelto) 15 mg tablet   No No   Sig: Take 1 tablet (15 mg total) by mouth every evening   spironolactone (ALDACTONE) 25 mg tablet   No No   Sig: Take 0.5  tablets (12.5 mg total) by mouth every other day      Facility-Administered Medications: None     Allergies:  No Known Allergies  ------------------------------------------------------------------------------------------------------------  Advance Directive and Living Will:      Power of :    POLST:    ------------------------------------------------------------------------------------------------------------  Care Time Delivered:   No Critical Care time spent

## 2024-01-25 NOTE — ED NOTES
Patient arrived trauma naked from [facility] with only a clear plastic water bottle with a lime green cap in her possession. No other belongings arrived with patient to [facility].     Jocelin Rodriguez RN  01/25/24 [id number]

## 2024-01-26 ENCOUNTER — APPOINTMENT (INPATIENT)
Dept: RADIOLOGY | Facility: HOSPITAL | Age: 84
DRG: 184 | End: 2024-01-26
Payer: COMMERCIAL

## 2024-01-26 PROBLEM — S22.49XA MULTIPLE RIB FRACTURES: Status: ACTIVE | Noted: 2024-01-26

## 2024-01-26 LAB
ALBUMIN SERPL BCP-MCNC: 3.7 G/DL (ref 3.5–5)
ALP SERPL-CCNC: 56 U/L (ref 34–104)
ALT SERPL W P-5'-P-CCNC: 6 U/L (ref 7–52)
ANION GAP SERPL CALCULATED.3IONS-SCNC: 10 MMOL/L
APTT PPP: 26 SECONDS (ref 23–37)
AST SERPL W P-5'-P-CCNC: 21 U/L (ref 13–39)
BACTERIA UR QL AUTO: ABNORMAL /HPF
BILIRUB SERPL-MCNC: 1.17 MG/DL (ref 0.2–1)
BILIRUB UR QL STRIP: NEGATIVE
BUN SERPL-MCNC: 24 MG/DL (ref 5–25)
CALCIUM SERPL-MCNC: 8.8 MG/DL (ref 8.4–10.2)
CHLORIDE SERPL-SCNC: 101 MMOL/L (ref 96–108)
CLARITY UR: CLEAR
CO2 SERPL-SCNC: 25 MMOL/L (ref 21–32)
COLOR UR: ABNORMAL
CREAT SERPL-MCNC: 1.19 MG/DL (ref 0.6–1.3)
ERYTHROCYTE [DISTWIDTH] IN BLOOD BY AUTOMATED COUNT: 12.8 % (ref 11.6–15.1)
GFR SERPL CREATININE-BSD FRML MDRD: 42 ML/MIN/1.73SQ M
GLUCOSE SERPL-MCNC: 88 MG/DL (ref 65–140)
GLUCOSE UR STRIP-MCNC: NEGATIVE MG/DL
GRAN CASTS #/AREA URNS LPF: ABNORMAL /[LPF]
HCT VFR BLD AUTO: 40.1 % (ref 34.8–46.1)
HGB BLD-MCNC: 13.2 G/DL (ref 11.5–15.4)
HGB UR QL STRIP.AUTO: NEGATIVE
HYALINE CASTS #/AREA URNS LPF: ABNORMAL /LPF
INR PPP: 1.31 (ref 0.84–1.19)
KETONES UR STRIP-MCNC: NEGATIVE MG/DL
LEUKOCYTE ESTERASE UR QL STRIP: NEGATIVE
MAGNESIUM SERPL-MCNC: 2.3 MG/DL (ref 1.9–2.7)
MCH RBC QN AUTO: 31.5 PG (ref 26.8–34.3)
MCHC RBC AUTO-ENTMCNC: 32.9 G/DL (ref 31.4–37.4)
MCV RBC AUTO: 96 FL (ref 82–98)
NITRITE UR QL STRIP: NEGATIVE
NON-SQ EPI CELLS URNS QL MICRO: ABNORMAL /HPF
PH UR STRIP.AUTO: 5.5 [PH]
PLATELET # BLD AUTO: 188 THOUSANDS/UL (ref 149–390)
PMV BLD AUTO: 11.1 FL (ref 8.9–12.7)
POTASSIUM SERPL-SCNC: 4.3 MMOL/L (ref 3.5–5.3)
PROT SERPL-MCNC: 6.6 G/DL (ref 6.4–8.4)
PROT UR STRIP-MCNC: ABNORMAL MG/DL
PROTHROMBIN TIME: 16.2 SECONDS (ref 11.6–14.5)
RBC # BLD AUTO: 4.19 MILLION/UL (ref 3.81–5.12)
RBC #/AREA URNS AUTO: ABNORMAL /HPF
SODIUM SERPL-SCNC: 136 MMOL/L (ref 135–147)
SP GR UR STRIP.AUTO: 1.02 (ref 1–1.03)
UROBILINOGEN UR STRIP-ACNC: <2 MG/DL
VIT B12 SERPL-MCNC: 101 PG/ML (ref 180–914)
WBC # BLD AUTO: 6.1 THOUSAND/UL (ref 4.31–10.16)
WBC #/AREA URNS AUTO: ABNORMAL /HPF

## 2024-01-26 PROCEDURE — 82607 VITAMIN B-12: CPT

## 2024-01-26 PROCEDURE — 85027 COMPLETE CBC AUTOMATED: CPT

## 2024-01-26 PROCEDURE — 97163 PT EVAL HIGH COMPLEX 45 MIN: CPT

## 2024-01-26 PROCEDURE — 71045 X-RAY EXAM CHEST 1 VIEW: CPT

## 2024-01-26 PROCEDURE — NC001 PR NO CHARGE

## 2024-01-26 PROCEDURE — 99233 SBSQ HOSP IP/OBS HIGH 50: CPT | Performed by: SURGERY

## 2024-01-26 PROCEDURE — 85610 PROTHROMBIN TIME: CPT

## 2024-01-26 PROCEDURE — 81001 URINALYSIS AUTO W/SCOPE: CPT | Performed by: STUDENT IN AN ORGANIZED HEALTH CARE EDUCATION/TRAINING PROGRAM

## 2024-01-26 PROCEDURE — 80053 COMPREHEN METABOLIC PANEL: CPT | Performed by: NURSE PRACTITIONER

## 2024-01-26 PROCEDURE — 85730 THROMBOPLASTIN TIME PARTIAL: CPT

## 2024-01-26 PROCEDURE — 83735 ASSAY OF MAGNESIUM: CPT | Performed by: NURSE PRACTITIONER

## 2024-01-26 PROCEDURE — 97167 OT EVAL HIGH COMPLEX 60 MIN: CPT

## 2024-01-26 PROCEDURE — 99223 1ST HOSP IP/OBS HIGH 75: CPT | Performed by: INTERNAL MEDICINE

## 2024-01-26 RX ADMIN — SENNOSIDES, DOCUSATE SODIUM 1 TABLET: 8.6; 5 TABLET ORAL at 21:29

## 2024-01-26 RX ADMIN — GABAPENTIN 100 MG: 100 CAPSULE ORAL at 21:28

## 2024-01-26 RX ADMIN — METOPROLOL TARTRATE 100 MG: 50 TABLET ORAL at 08:05

## 2024-01-26 RX ADMIN — ATORVASTATIN CALCIUM 10 MG: 10 TABLET, FILM COATED ORAL at 08:05

## 2024-01-26 RX ADMIN — SPIRONOLACTONE 12.5 MG: 25 TABLET ORAL at 08:05

## 2024-01-26 RX ADMIN — METOPROLOL SUCCINATE 25 MG: 25 TABLET, EXTENDED RELEASE ORAL at 08:06

## 2024-01-26 RX ADMIN — LIDOCAINE 5% 1 PATCH: 700 PATCH TOPICAL at 08:05

## 2024-01-26 RX ADMIN — RIVAROXABAN 15 MG: 15 TABLET, FILM COATED ORAL at 17:11

## 2024-01-26 RX ADMIN — ACETAMINOPHEN 975 MG: 325 TABLET, FILM COATED ORAL at 06:16

## 2024-01-26 RX ADMIN — ACETAMINOPHEN 975 MG: 325 TABLET, FILM COATED ORAL at 15:33

## 2024-01-26 RX ADMIN — METOPROLOL SUCCINATE 25 MG: 25 TABLET, EXTENDED RELEASE ORAL at 21:28

## 2024-01-26 RX ADMIN — ACETAMINOPHEN 975 MG: 325 TABLET, FILM COATED ORAL at 21:29

## 2024-01-26 RX ADMIN — METOPROLOL SUCCINATE 25 MG: 25 TABLET, EXTENDED RELEASE ORAL at 17:10

## 2024-01-26 RX ADMIN — METOPROLOL TARTRATE 100 MG: 50 TABLET ORAL at 21:28

## 2024-01-26 RX ADMIN — LOSARTAN POTASSIUM 50 MG: 50 TABLET, FILM COATED ORAL at 08:06

## 2024-01-26 NOTE — PLAN OF CARE
Problem: OCCUPATIONAL THERAPY ADULT  Goal: Performs self-care activities at highest level of function for planned discharge setting.  See evaluation for individualized goals.  Description: Treatment Interventions: ADL retraining, Functional transfer training, Endurance training, Patient/family training, Equipment evaluation/education, Compensatory technique education, Energy conservation, Activityengagement          See flowsheet documentation for full assessment, interventions and recommendations.   Note: Limitation: Decreased ADL status, Decreased endurance, Decreased self-care trans, Decreased high-level ADLs  Prognosis: Good  Assessment: Pt is a 83 y.o. female who was admitted to [facility] on 1/25/2024 with Multiple rib fractures s/p a fall at home. Denies headstrike. Patient's PMH includes  A-fib on digoxin, metoprolol, and Xarelto, CHF on Lasix, spironolactone, and losartan, CKD, DM 2, and HTN.   At baseline pt was I w/ ADLs, IADLs, and mod I for community mobility w/ SPC. Pt lives alone in a 2 SH w/ FF s/u. Currently pt requires min-mod A for overall ADLS and min-mod Ax1 for functional mobility/transfers. Pt currently presents with impairments in the following categories -limited home support, difficulty performing ADLS, and difficulty performing IADLS  activity tolerance, endurance, and standing balance/tolerance. These impairments, as well as pt's fatigue, pain, decreased caregiver support, and risk for falls  limit pt's ability to safely engage in all baseline areas of occupation, including grooming, bathing, dressing, toileting, and functional mobility/transfers. The patient's raw score on the AM-PAC Daily Activity Inpatient Short Form is 16. A raw score of less than 19 suggests the patient may benefit from discharge to post-acute rehabilitation services. Please refer to the recommendation of the Occupational Therapist for safe discharge planning. From OT standpoint, recommend inpatient rehab  pending progress upon D/C. OT will continue to follow to address the below stated goals.     Rehab Resource Intensity Level, OT: II (Moderate Resource Intensity)

## 2024-01-26 NOTE — ASSESSMENT & PLAN NOTE
Multimodal Pain control  Acetaminophen 975 mg Q8 hrs scheduled   Gabapentin 100 mg at bedtime   Lidocaine 5% patch daily to right chest wall   Dilaudid 0.2mg IV Q2 hrs PRN breakthorugh pain  Oxycodone 2.5mg Q4 hrs PRN moderate pain  Oxycodone 5mg Q4 hrs PRN sevre pain  Encourage use of IS  Pulmonary toilet  Airway clearance protocol   Encourage early ambulation and mobility   Nasal cannula prn, O2 goal >92%

## 2024-01-26 NOTE — PLAN OF CARE
Problem: PHYSICAL THERAPY ADULT  Goal: Performs mobility at highest level of function for planned discharge setting.  See evaluation for individualized goals.  Description: Treatment/Interventions: Functional transfer training, LE strengthening/ROM, Therapeutic exercise, Endurance training, Patient/family training, Equipment eval/education, Bed mobility, Gait training, Spoke to nursing, Spoke to case management, OT          See flowsheet documentation for full assessment, interventions and recommendations.  Note: Prognosis: Good  Problem List: Decreased strength, Decreased endurance, Impaired balance, Decreased mobility, Pain  Assessment: Pt is a 83 y.o. female seen for PT evaluation s/p admit to [facility] on 1/25/2024. Pt was admitted with a primary dx of: multiple rib fx s/p fall.  PT now consulted for assessment of mobility and d/c needs. Pt with Up in chair orders.  Pts current comorbidities effecting treatment include: a-fib, hyperlipidemia, malnutrition, type 2 DM. Pt  has a past medical history of Atrial fibrillation (HCC) and Hypercholesteremia. Pts current clinical presentation is Unstable/ Unpredictable (high complexity) due to Ongoing medical management for primary dx, Increased reliance on more restrictive AD compared to baseline, Decreased activity tolerance compared to baseline, Fall risk, Increased assistance needed from caregiver at current time, Ongoing telemetry monitoring, Trending lab values, Continuous pulse oximetry monitoring . Prior to admission, pt was residing alone in 2  with 4 TREVOR and was independent using [facility] for community distance ambulation. Upon evaluation, pt currently is requiring min A for bed mobility; min A for transfers; mod A for ambulation 4 ft w/  HHA . Pt presents at PT eval functioning below baseline and currently w/ overall mobility deficits 2* to: BLE weakness, decreased ROM, impaired balance, decreased endurance, gait deviations, pain, decreased activity  tolerance compared to baseline, decreased functional mobility tolerance compared to baseline, fall risk. Pt currently at a fall risk 2* to impairments listed above.  Pt will continue to benefit from skilled acute PT interventions to address stated impairments; to maximize functional mobility; for ongoing pt/ family training; and DME needs. At conclusion of PT session pt returned back in chair with phone and call bell within reach. Pt denies any further questions at this time. The patient's AM-PAC Basic Mobility Inpatient Short Form Raw Score is 15. A Raw score of less than or equal to 16 suggests the patient may benefit from discharge to post-acute rehabilitation services. Please also refer to the recommendation of the Physical Therapist for safe discharge planning. Recommend STR pending progress upon hospital D/C.  Barriers to Discharge: Inaccessible home environment, Decreased caregiver support     Rehab Resource Intensity Level, PT: II (Moderate Resource Intensity)    See flowsheet documentation for full assessment.

## 2024-01-26 NOTE — PHYSICAL THERAPY NOTE
Physical Therapy Evaluation     Patient's Name: Moriah Arreola    Admitting Diagnosis  Closed fracture of multiple ribs of right side, initial encounter [S22.41XA]    Problem List  Patient Active Problem List   Diagnosis    Acute combined systolic and diastolic congestive heart failure (HCC)    Atrial fibrillation with RVR (HCC)    JUANITA (acute kidney injury) (HCC)    Hyperlipemia    Primary hypertension    Mild protein-calorie malnutrition (HCC)    PAD (peripheral artery disease) (HCC)    Closed wedge compression fracture of thoracic vertebra (HCC)    Stage 3b chronic kidney disease (HCC)    Type 2 diabetes mellitus with stage 3b chronic kidney disease, without long-term current use of insulin (HCC)    Type 2 diabetes mellitus with diabetic nephropathy, without long-term current use of insulin (HCC)    Multiple rib fractures       Past Medical History  Past Medical History:   Diagnosis Date    Atrial fibrillation (HCC)     Hypercholesteremia        Past Surgical History  Past Surgical History:   Procedure Laterality Date    EMBOLIZATION      HERNIA REPAIR            01/26/24 1107   PT Last Visit   PT Visit Date 01/26/24   Note Type   Note type Evaluation   Pain Assessment   Pain Assessment Tool 0-10   Pain Score 4   Pain Location/Orientation Orientation: Right;Location: Rib Cage   Hospital Pain Intervention(s) Repositioned;Ambulation/increased activity;Emotional support   Restrictions/Precautions   Weight Bearing Precautions Per Order No   Other Precautions Multiple lines;Telemetry;Fall Risk;Pain   Home Living   Type of Home House   Home Layout Two level;Bed/bath upstairs  (4 TREVOR)   Bathroom Shower/Tub Tub/shower unit   Bathroom Toilet Standard   Bathroom Equipment Grab bars in shower   Home Equipment Walker;Cane  (uses SPC for community distances)   Prior Function   Level of [place] Independent with ADLs;Independent with functional mobility;Independent with IADLS   Lives With (S)  Alone   Receives Help  From Friend(s)   IADLs Independent with driving;Independent with meal prep;Independent with medication management   Falls in the last 6 months 1 to 4  (1x leading to current admission)   Vocational Retired   General   Family/Caregiver Present No   Cognition   Overall Cognitive Status WFL   Arousal/Participation Cooperative   Orientation Level Oriented X4   Memory Decreased recall of precautions   Following Commands Follows one step commands without difficulty   Comments pt pleasant and cooperative   RLE Assessment   RLE Assessment   (functionally 3+/5)   LLE Assessment   LLE Assessment   (functionally 3+/5)   Bed Mobility   Supine to Sit 4  Minimal assistance   Additional items Assist x 1;Bedrails;HOB elevated;Increased time required   Sit to Supine Unable to assess   Additional Comments pt supine in bed upon arrival. Pt left sitting OOB in recliner with all needs witin reach   Transfers   Sit to Stand 4  Minimal assistance   Additional items Assist x 1;Increased time required;Verbal cues   Stand to Sit 4  Minimal assistance   Additional items Assist x 1;Increased time required;Verbal cues   Additional Comments transfers with HHA   Ambulation/Elevation   Gait pattern Excessively slow;Short stride;Decreased foot clearance;Improper Weight shift   Gait Assistance 3  Moderate assist   Additional items Assist x 1;Verbal cues   Assistive Device Other (Comment)  (HHA)   Distance 4' bed>recliner   Stair Management Assistance Not tested   Balance   Static Sitting Fair   Dynamic Sitting Fair -   Static Standing Poor +   Dynamic Standing Poor +   Ambulatory Poor +   Endurance Deficit   Endurance Deficit Yes   Endurance Deficit Description pain, weakness, fatigue   Activity Tolerance   Activity Tolerance Patient tolerated treatment well   Medical Staff Made Aware OT; co-session completed this date 2* increased medical complexity and multiple co-morbidities   Nurse Made Aware RN cleared pt to participate in therapy session    Assessment   Prognosis Good   Problem List Decreased strength;Decreased endurance;Impaired balance;Decreased mobility;Pain   Assessment Pt is a 83 y.o. female seen for PT evaluation s/p admit to [facility] on [facility]/25/[facility]24. Pt was admitted with a primary dx of: multiple rib fx s/p fall.  PT now consulted for assessment of mobility and d/c needs. Pt with Up in chair orders.  Pts current comorbidities effecting treatment include: a-fib, hyperlipidemia, malnutrition, type 2 DM. Pt  has a past medical history of Atrial fibrillation (HCC) and Hypercholesteremia. Pts current clinical presentation is Unstable/ Unpredictable (high complexity) due to Ongoing medical management for primary dx, Increased reliance on more restrictive AD compared to baseline, Decreased activity tolerance compared to baseline, Fall risk, Increased assistance needed from caregiver at current time, Ongoing telemetry monitoring, Trending lab values, Continuous pulse oximetry monitoring . Prior to admission, pt was residing alone in 2  with 4 TREVOR and was independent using [facility] for community distance ambulation. Upon evaluation, pt currently is requiring min A for bed mobility; min A for transfers; mod A for ambulation 4 ft w/  HHA . Pt presents at PT eval functioning below baseline and currently w/ overall mobility deficits 2* to: BLE weakness, decreased ROM, impaired balance, decreased endurance, gait deviations, pain, decreased activity tolerance compared to baseline, decreased functional mobility tolerance compared to baseline, fall risk. Pt currently at a fall risk 2* to impairments listed above.  Pt will continue to benefit from skilled acute PT interventions to address stated impairments; to maximize functional mobility; for ongoing pt/ family training; and DME needs. At conclusion of PT session pt returned back in chair with phone and call bell within reach. Pt denies any further questions at this time. The patient's AM-PAC Basic  Mobility Inpatient Short Form Raw Score is 15. A Raw score of less than or equal to 16 suggests the patient may benefit from discharge to post-acute rehabilitation services. Please also refer to the recommendation of the Physical Therapist for safe discharge planning. Recommend STR pending progress upon hospital D/C.   Barriers to Discharge Inaccessible home environment;Decreased caregiver support   Goals   Patient Goals to get OOB   STG Expiration Date 02/09/24   Short Term Goal #1 STG 1. Pt will be able to perform bed mobility tasks with mod I level in order to improve overall functional mobility and assist in safe d/c. STG 2. Pt with sit EOB for at least 25 minutes at mod I level in order to strengthen abdominal musculature and assist in future transfers/ ambulation. STG 3. Pt will be able to perform functional transfer with mod I level in order to improve overall functional mobility and assist in safe d/c. STG 4. Pt will be able to ambulate at least 250 feet with least restrictive device with mod I level A in order to improve overall functional mobility and assist in safe d/c. STG 5. Pt will improve sitting/standing static/dynamic balance 1/2 grade in order to improve functional mobility and assist in safe d/c. STG 6. Pt will improve LE strength by 1/2 grade in order to improve functional mobility and assist in safe d/c. STG 7. Pt will be able to negotiate at least 7 stairs with least restrictive device with mod I level A in order to improve overall functional mobility and assist in safe d/c.   PT Treatment Day 0   Plan   Treatment/Interventions Functional transfer training;LE strengthening/ROM;Therapeutic exercise;Endurance training;Patient/family training;Equipment eval/education;Bed mobility;Gait training;Spoke to nursing;Spoke to case management;OT   PT Frequency 3-5x/wk   Discharge Recommendation   Rehab Resource Intensity Level, PT II (Moderate Resource Intensity)   AM-PAC Basic Mobility Inpatient    Turning in Flat Bed Without Bedrails 3   Lying on Back to Sitting on Edge of Flat Bed Without Bedrails 3   Moving Bed to Chair 2   Standing Up From Chair Using Arms 3   Walk in Room 2   Climb 3-5 Stairs With Railing 2   Basic Mobility Inpatient Raw Score 15   Basic Mobility Standardized Score 36.97   Highest Level Of Mobility   -[facility] Goal 4: Move to chair/commode   JH-HLM Achieved 4: Move to chair/commode   Modified Connie Scale   Modified [place] Scale 4         Pema Pinto, PT DPT

## 2024-01-26 NOTE — UTILIZATION REVIEW
NOTIFICATION OF INPATIENT ADMISSION   AUTHORIZATION REQUEST   SERVICING FACILITY:   [facility]  Address: [address] [place] [address]  Tax ID: [id number]  NPI: [id number] ATTENDING PROVIDER:  Attending Name and NPI#: Ricardo Braxton Do [[id number]]  Address: [address] [place] [address]  Phone: [phone]   ADMISSION INFORMATION:  Place of Service: Inpatient [facility]  Place of Service Code: 21  Inpatient Admission Date/Time: 1/25/24  3:20 PM  Discharge Date/Time: No discharge date for patient encounter.  Admitting Diagnosis Code/Description:  Closed fracture of multiple ribs of right side, initial encounter [S22.41XA]     UTILIZATION REVIEW CONTACT:  Gabriele Drake Utilization   Network Utilization Review Department  Phone: [phone]  Fax: [phone]  Email: [email or web address]  Contact for approvals/pending authorizations, clinical reviews, and discharge.     PHYSICIAN ADVISORY SERVICES:  Medical Necessity Denial & Xwya-rm-Xyxa Review  Phone: [phone]  Fax: [phone]  Email: [email or web address]     DISCHARGE SUPPORT TEAM:  For Patients Discharge Needs & Updates  Phone: [phone] opt. 2 Fax: [phone]  Email: [email or web address]

## 2024-01-26 NOTE — CONSULTS
Consultation - Geriatric Medicine   Moriah ANGELLA Arreola 83 y.o. female MRN: [id number]  Unit/Bed#: [facility] 207-01 Encounter: [id number]      Assessment/Plan     Ambulatory dysfunction with fall  -reportedly mechanical fall at home 1/25/24  -(-) head strike (-) loss of consciousness  -In setting of chronic systemic anticoagulation with Xarelto (A-fib)   -injuries as outlined below  -Does not require use of assist device for ambulation at baseline  -no prior hx recurrent falls  -Increased risk future falls due to age, hx fall, deconditioning/debility and unfamiliar environment   -encourage good body mechanics and assist with all transfers  -keep personal items and call bell close to prevent reaching  -maintain environment free of fall hazards  -encourage appropriate footwear and adequate lighting at all times when out of bed  -Recommend home fall risk assessment and personal fall alert system on returning home  -PT and OT pending    Multiple right-sided rib fractures (7-11)  -s/p fall as outlined above   -CT chest abdomen pelvis on admission reports acute minimally displaced fractures of right-sided ribs 7 through 11 with small right pleural effusion  -No pneumothorax reported  -Saturating well on room air  -Very self motivated with ISS -pulling 250 in presence of examiner  -Continue aggressive pulmonary toilet  -Acute multimodal pain control    Acute pain due to trauma  -Recommend pain control per Geriatric pain protocol:  Tylenol 975mg Q8H scheduled  Roxicodone 2.5mg Q4H PRN moderate pain  Roxicodone 5mg Q4H PRN severe pain  Dilaudid 0.2mg Q4H PRN  -Consider adjuncts such as lidocaine patch topically  -encourage addition of non-pharmacologic pain treatment including ice and frequent repositioning  -recommend  bowel regimen to prevent and treat constipation due to increased risk with acute pain and opiate pain medications    Hyponatremia  -[Na] 130 on admission  -Now resolved, monitor electrolytes closely and avoid rapid  and drastic fluctuations of no more than 8-12 pts/24H    Hypertensive urgency  -/96 on initial presentation  -Likely multifactorial including acute pain and stressors related to injuries/hospitalization  -Now markedly improved, continue optimization of hemodynamics    A-fib with RVR  -Home regimen includes rate control with digoxin as well as both metoprolol tartrate and metoprolol succinate  -Chronic systemic anticoagulant with Xarelto, denies history of easy bruising or uncontrolled bleeding  -Continue close outpatient follow with PCP and cardiology for ongoing titration and medication management    Chronic combined systolic and diastolic CHF  -EF 25-to 30% by echo 8/2022  -Maintained on Lasix and spironolactone every other day chronically as outpatient  -Monitor electrolytes,weights and volume status closely  -Continue healthy lifestyle choices and secondary risk factor modifications  -Continue close outpatient follow-up with Cardiology    Cognitive screening  -Alert and oriented, endorses mild age-related forgetfulness which she does not feel impacts her daily life   -Resides home alone and independent with ADLs and IADLs  -No prior cognitive testing on record for review  -CTH obtained on admission images personally viewed, reveals moderate diffuse chronic microangiopathic changes as well as large area encephalomalacia right temporal/temporoparietal area   -TSH WNL at 2.05, no recent B12 on record review, given concern for age-related forgetfulness recommend checking with next routine labs  -Patient encouraged to stay physically, socially, and cognitively active and engaged to maintain cognitive acuity  -Continue use of sensory assistive devices such as corrective lenses appropriate times reduce risk of uncorrected sensory impairment contributing to isolation, confusion, encephalopathy and more precipitous cognitive decline    Impaired Vision  -recommend use of corrective lenses at all appropriate  times  -encourage adequate lighting and encourage use of assistance with ambulation  -keep personal belongings close to person to avoid reaching  -encourage appropriate footwear at all times  -Consider large font for printed materials provided to patient    Deconditioning/debility/frailty  -Clinical frailty scale stage IV, vulnerable  -Multifactoral including age, A-fib with RVR, chronic combined systolic and diastolic CHF, CKD and multitude of additional chronic medical comorbidities now with fall and acute traumatic injury superimposed in elderly individual with limited physiologic and metabolic reserves increasing sensitivity to even seemingly mild additional metabolic derangements/stressors  -Continue optimization of chronic medical conditions and address acute metabolic derangements as arise  -Monitor for and treat any underlying anxiety/mood/depression symptoms as may impact response to therapies as well as overall sense of wellbeing and quality of life  -Continue psychosocial supports  -Ensure that treatments and interventions continue to align with patient's wishes and goals of care    Delirium precautions  -Patient is high risk of delirium due to age, fall, trauma injuries, acute pain, hospitalization  -Initiate delirium precautions  -maintain normal sleep/wake cycle  -ensure that pain is well controlled -geriatric pain protocol as above  -monitor for fecal and urinary retention which may precipitate delirium  -encourage early mobilization and ambulation with assist as cleared to safely do so  -provide frequent reorientation and redirection as indicated and appropriate  -avoid medications which may precipitate or worsen delirium such as tramadol, benzodiazepine, anticholinergics, and benadryl whenever possible  -encourage hydration and nutrition   -redirect unwanted behaviors as first line    Home medication review  [facility] [phone]:    Lipitor 10 Mg daily  Digoxin 125 mcg 5 times weekly since  11/2023 per Cardiology and confirmed with patient   Lasix 20 Mg every other day  Losartan 50 Mg daily  Toprol-XL 25 Mg 3 times daily  (pt reports takes once daily in the morning)  Metoprolol Tartrate 100mg Q12H   Xarelto 15 Mg daily  Spironolactone 12.5 Mg every other day    Care coordination: Rounded with Johanna (RN)    History of Present Illness   Physician Requesting Consult: Ricardo Braxton DO  Reason for Consult / Principal Problem: Fall  Hx and PE limited by: N/A  Additional history obtained from: Chart review and patient evaluation    HPI: Moriah Arreola is a 83 y.o. year old female with A-fib with RVR, hypertension, type 2 diabetes without long-term use of insulin, combined systolic and diastolic heart failure, PAD, mild protein calorie malnutrition, and CKD 3b who is admitted to trauma service with ambulatory dysfunction and fall found to have multiple right-sided rib fractures on admission imaging, she is being seen in consultation by Geriatrics for high risk of developing delirium during hospitalization. Moriah is seen and examined at bedside where she is sitting resting comfortably, she explains that she sustained a mechanical fall at home yesterday when she got tripped over the bathroom causing her to fall striking her right rib cage against the bathtub.  She denied head strike or loss of consciousness and was able to get up under her own power. She did not seek immediate medical attention as she did not feel that she had a significant injuries however over the next few hours she developed increasing right chest wall pain prompting presentation to the [facility] where she was found to have multiple right-sided rib fractures and was transferred to [facility] for further evaluation and admission to trauma.  She reports since admission her pain has been well-controlled and she is now able to utilize her incentive spirometer pulling approximately 250 cc with ease.  She denies shortness of breath or other  acute complaints and is looking forward to breakfast.    Moriah reports that prior to admission she was residing home alone in the same home she has lived in for the past 50 years.  She reports independence with ADLs and IADLs and denies memory or cognitive concerns beyond age-related forgetfulness.  She denies use of assistive family at baseline or prior history of any falls.  She utilizes glasses, does not use hearing aids or dentures.    Inpatient consult to Gerontology  Consult performed by: Sarai Deleon DO  Consult ordered by: Kali Agarwal MD      Review of Systems   Constitutional: Negative.  Negative for appetite change, chills and fever.   HENT: Negative.     Eyes:  Positive for visual disturbance (Wears glasses).   Respiratory: Negative.  Negative for shortness of breath.    Cardiovascular:         Right-sided rib cage pain currently well-controlled   Gastrointestinal: Negative.    Genitourinary: Negative.    Musculoskeletal: Negative.  Negative for gait problem.   Skin: Negative.    Neurological: Negative.  Negative for dizziness, weakness, light-headedness, numbness and headaches.   Hematological: Negative.  Does not bruise/bleed easily.   Psychiatric/Behavioral: Negative.     All other systems reviewed and are negative.    Historical Information   Past Medical History:   Diagnosis Date    Atrial fibrillation (HCC)     Hypercholesteremia      Past Surgical History:   Procedure Laterality Date    EMBOLIZATION      HERNIA REPAIR       Social History   Social History     Substance and Sexual Activity   Alcohol Use Yes    Alcohol/week: 7.0 standard drinks of alcohol    Types: 7 Standard drinks or equivalent per week    Comment: one glass wine once a week     Social History     Substance and Sexual Activity   Drug Use Not Currently     Social History     Tobacco Use   Smoking Status Former    Current packs/day: 0.00    Types: Cigarettes    Quit date:     Years since quittin.0   Smokeless  Tobacco Never     Family History: Parents are     Meds/Allergies   all current active meds have been reviewed    No Known Allergies    Objective     Intake/Output Summary (Last 24 hours) at 2024 0802  Last data filed at 2024 0100  Gross per 24 hour   Intake --   Output 550 ml   Net -550 ml     Invasive Devices       Peripheral Intravenous Line  Duration             Peripheral IV 24 Left Antecubital <1 day                  Physical Exam  Vitals and nursing note reviewed.   Constitutional:       General: She is not in acute distress.     Appearance: Normal appearance. She is not toxic-appearing.      Comments: Well appearing elderly female    HENT:      Head: Normocephalic and atraumatic.      Nose: Nose normal.      Mouth/Throat:      Mouth: Mucous membranes are dry.   Eyes:      General: No scleral icterus.        Right eye: No discharge.         Left eye: No discharge.      Conjunctiva/sclera: Conjunctivae normal.      Comments: Wearing glasses    Neck:      Comments: Trachea midline, phonation normal  Cardiovascular:      Rate and Rhythm: Normal rate.   Pulmonary:      Effort: Pulmonary effort is normal. No respiratory distress.      Breath sounds: No wheezing.      Comments: No increased work of breathing or conversational dyspnea, saturating well on room air   Abdominal:      General: Bowel sounds are normal. There is no distension.      Palpations: Abdomen is soft.      Tenderness: There is no abdominal tenderness.   Musculoskeletal:      Cervical back: Neck supple.      Right lower leg: No edema.      Left lower leg: No edema.      Comments: Mildly reduced overall muscle mass    Skin:     General: Skin is warm and dry.   Neurological:      Mental Status: She is alert.      Comments: Awake and alert, oriented, answers questions appropriately, speech clear and fluent    Psychiatric:         Mood and Affect: Mood normal.         Behavior: Behavior normal.      Comments: Polite pleasant and  cooperative        Lab Results:     I have personally reviewed pertinent lab results including the following:    Results from last 7 days   Lab Units 01/26/24  0602 01/25/24  0916   WBC Thousand/uL 6.10 7.03   HEMOGLOBIN g/dL 13.2 13.3   HEMATOCRIT % 40.1 38.9   PLATELETS Thousands/uL 188 244   NEUTROS PCT %  --  74   MONOS PCT %  --  8   EOS PCT %  --  1     Results from last 7 days   Lab Units 01/26/24  0602 01/25/24  1522 01/25/24  0920   POTASSIUM mmol/L 4.3 4.2 4.3   CHLORIDE mmol/L 101 98 95*   CO2 mmol/L 25 27 28   BUN mg/dL 24 17 19   CREATININE mg/dL 1.19 0.93 0.96   CALCIUM mg/dL 8.8 9.1 9.4   ALK PHOS U/L 56 65 65   ALT U/L 6* 8 10   AST U/L 21 19 18     I have personally reviewed the following imaging study reports in PACS:    1/25/24-CT head without contrast, CT chest abdomen pelvis with contrast    Therapies:   PT: Pending  OT: Pending    VTE Prophylaxis: Enoxaparin (Lovenox)    Code Status: Level 1 - Full Code  Advance Directive and Living Will:      Power of :    POLST:      Family and Social Support: Friends    Goals of Care: Acute pain control

## 2024-01-26 NOTE — OCCUPATIONAL THERAPY NOTE
Occupational Therapy Evaluation     Patient Name: Moriah Arreola  Today's Date: 1/26/2024  Problem List  Principal Problem:    Multiple rib fractures  Active Problems:    Atrial fibrillation (HCC)    Primary hypertension    Type 2 diabetes mellitus with stage 3b chronic kidney disease, without long-term current use of insulin (HCC)    Past Medical History  Past Medical History:   Diagnosis Date    Atrial fibrillation (HCC)     Hypercholesteremia      Past Surgical History  Past Surgical History:   Procedure Laterality Date    EMBOLIZATION      HERNIA REPAIR             01/26/24 1105   OT Last Visit   OT Visit Date 01/26/24   Note Type   Note type Evaluation   Pain Assessment   Pain Assessment Tool 0-10   Pain Score 4   Pain Location/Orientation Location: Rib Cage   Restrictions/Precautions   Weight Bearing Precautions Per Order No   Other Precautions Multiple lines;Telemetry;Fall Risk;Pain   Home Living   Type of Home House   Home Layout Two level;Bed/bath upstairs  (4 TREVOR)   Bathroom Shower/Tub Tub/shower unit   Bathroom Toilet Standard   Bathroom Equipment Grab bars in shower   Home Equipment Walker;Cane   Prior Function   Level of [place] Independent with ADLs;Independent with functional mobility;Needs assistance with IADLS   Lives With (S)  Alone   Receives Help From Friend(s)   IADLs Independent with driving;Independent with meal prep;Independent with medication management   Falls in the last 6 months 1 to 4  (admitting fall)   Vocational Retired   Lifestyle   Autonomy Pt I w/ ADLs, IADLs, and mobility. Uses a cane for community mobility. +   Reciprocal Relationships supportive friends   Service to Others retired   Intrinsic Gratification being active   ADL   Where Assessed Edge of bed   Eating Assistance 5  Supervision/Setup   Eating Deficit Setup   Grooming Assistance 4  Minimal Assistance   UB Bathing Assistance 4  Minimal Assistance   LB Bathing Assistance 3  Moderate Assistance   UB  Dressing Assistance 4  Minimal Assistance   LB Dressing Assistance 3  Moderate Assistance   Toileting Assistance  3  Moderate Assistance   Functional Assistance 3  Moderate Assistance   Bed Mobility   Supine to Sit 4  Minimal assistance   Additional items Assist x 1;Bedrails   Additional Comments Pt in recliner chair at end of session for x-ray   Transfers   Sit to Stand 4  Minimal assistance   Additional items Assist x 1;Increased time required   Stand to Sit 4  Minimal assistance   Additional items Assist x 1;Increased time required   Additional Comments HHAx1   Balance   Static Sitting Fair +   Dynamic Sitting Fair   Static Standing Poor +   Dynamic Standing Poor +   Activity Tolerance   Activity Tolerance Patient tolerated treatment well   Medical Staff Made Aware PT 2* medical complexity   Nurse Made Aware Cleared w/ RN   RUE Assessment   RUE Assessment WFL   LUE Assessment   LUE Assessment WFL   Hand Function   Gross Motor Coordination Functional   Fine Motor Coordination Functional   Cognition   Overall Cognitive Status WFL   Arousal/Participation Alert;Responsive;Cooperative   Attention Within functional limits   Orientation Level Oriented X4   Memory Within functional limits   Following Commands Follows one step commands without difficulty   Comments Pt pleasant and agreeable to session   Assessment   Limitation Decreased ADL status;Decreased endurance;Decreased self-care trans;Decreased high-level ADLs   Prognosis Good   Assessment Pt is a 83 y.o. female who was admitted to [facility] on 1/25/2024 with Multiple rib fractures s/p a fall at home. Denies headstrike. Patient's PMH includes  A-fib on digoxin, metoprolol, and Xarelto, CHF on Lasix, spironolactone, and losartan, CKD, DM 2, and HTN.   At baseline pt was I w/ ADLs, IADLs, and mod I for community mobility w/ SPC. Pt lives alone in a 2 SH w/ FF s/u. Currently pt requires min-mod A for overall ADLS and min-mod Ax1 for functional  mobility/transfers. Pt currently presents with impairments in the following categories -limited home support, difficulty performing ADLS, and difficulty performing IADLS  activity tolerance, endurance, and standing balance/tolerance. These impairments, as well as pt's fatigue, pain, decreased caregiver support, and risk for falls  limit pt's ability to safely engage in all baseline areas of occupation, including grooming, bathing, dressing, toileting, and functional mobility/transfers. The patient's raw score on the AM-PAC Daily Activity Inpatient Short Form is 16. A raw score of less than 19 suggests the patient may benefit from discharge to post-acute rehabilitation services. Please refer to the recommendation of the Occupational Therapist for safe discharge planning. From OT standpoint, recommend inpatient rehab pending progress upon D/C. OT will continue to follow to address the below stated goals.   Goals   Patient Goals to get OOB   LTG Time Frame 10-14   Long Term Goal see below goals   Plan   Treatment Interventions ADL retraining;Functional transfer training;Endurance training;Patient/family training;Equipment evaluation/education;Compensatory technique education;Energy conservation;Activityengagement   Goal Expiration Date 02/09/24   OT Frequency 2-3x/wk   Discharge Recommendation   Rehab Resource Intensity Level, OT II (Moderate Resource Intensity)   AM-PAC Daily Activity Inpatient   Lower Body Dressing 2   Bathing 2   Toileting 2   Upper Body Dressing 3   Grooming 3   Eating 4   Daily Activity Raw Score 16   Daily Activity Standardized Score (Calc for Raw Score >=11) 35.96       LTG (10-14 DAYS)  Pt will improve activity tolerance to G for min 30-45 min txment sessions to increase participation in ADLs    Pt will complete ADLs/self care w/ mod I using adaptive device and DME as needed    Pt will complete toileting w/ mod I w/ G hygiene/thoroughness using DME as needed    Pt will improve functional  transfers on/off all surfaces to mod I using DME as needed w/ G balance/safety.    Pt will improve functional mobility during ADL/IADL/leisure tasks to mod I using DME as needed w/ G balance/safety.    Pt will improve standing balance to G for 8-10 minutes of purposeful activity w/ G endurance.    Pt will demonstrate 100% carryover of energy conservation techniques w/ mod I t/o functional I/ADL/leisure tasks w/o cues s/p skilled education     Pt will engage in ongoing cognitive assessment (as needed) w/ G participation w/ mod I to A w/ safe d/c planning/recommendations          Cande Chisholm OTR/L

## 2024-01-26 NOTE — UTILIZATION REVIEW
Initial Clinical Review    Admission: Date/Time/Statement:   Admission Orders (From admission, onward)       Ordered        01/25/24 1520  Inpatient Admission  Once                          Orders Placed This Encounter   Procedures    Inpatient Admission     Standing Status:   Standing     Number of Occurrences:   1     Order Specific Question:   Level of Care     Answer:   Level 1 Stepdown [13]     Order Specific Question:   Estimated length of stay     Answer:   More than 2 Midnights     Order Specific Question:   Certification     Answer:   I certify that inpatient services are medically necessary for this patient for a duration of greater than two midnights. See H&P and MD Progress Notes for additional information about the patient's course of treatment.     ED Arrival Information       Expected   1/25/2024 11:05    Arrival   1/25/2024 12:35    Acuity   Urgent              Means of arrival   Ambulance    Escorted by   [facility]    Service   Trauma    Admission type   Emergency              Arrival complaint   fall, rib fx             Chief Complaint   Patient presents with    Fall     Trauma transfer,right rib fx 7-11       Initial Presentation: 83 y.o. female presents to ed from home via ems for evaluation and treatment of fractured ribs due to fall.  She fell one day ago tripping over a rug landing on her right flank.  Her pain is worsening. PMHX: AFIB ON XARELTO  Clinical assessment significant for hypertension, ht rt 82 to 105. O2 sat 92% ra...T BILI 1.65, INR 1.52.  Imaging shows acute minimally displaced fractures 7 through 11, right pleural effusion.  Initially treated with po tylenol x1,lidoderm patch, sq lovenox. Digoxin, losartan, lasix, metoprolol.  Admit to inpatient step down: multimodal analgesia, incentive spirometer. PT/POT evaluations. Check serum and urine for hyponatremia.  Serial sodium checks.  Hyponatremia possibly related to diuretics.      Date: 1- 26-24    Day 2:  inpatient step down  Gerontology consulted. Patient lives alone and has been independent.  GCS=15.    Acute pain  4/10 . Plan includes: scheduled tylenol q8 hr, roxicodone prn, iv dilaudid prn. PT/OT evaluations completed. Moderate rehab needs identified.        ED Triage Vitals [01/25/24 1255]   98 °F (36.7 °C) 93 20 (!) 197/96 97 %      Oral Monitor         No Pain          01/25/24 44.6 kg (98 lb 6.4 oz)     Additional Vital Signs:     Date/Time Temp Pulse Resp BP MAP (mmHg) SpO2 O2 Device   01/26/24 1120 97.8 °F (36.6 °C) 68 -- 113/66 78 96 % --   01/26/24 0750 97.6 °F (36.4 °C) 72 -- 167/104 Abnormal  130 97 % --   01/26/24 0400 -- 74 -- 130/84 98 94 % --   01/26/24 0312 97.9 °F (36.6 °C) 70 -- 100/56 68 96 % --   01/25/24 2300 -- 92 -- 149/83 104 96 % --   01/25/24 1925 -- 78 -- -- 88 95 % --   01/25/24 1720 -- 105 -- -- -- -- --   01/25/24 1700 -- 81 22 140/97 115 96 % None (Room air)   01/25/24 1600 -- 80 22 153/86 112 96 % None (Room air)   01/25/24 1500 -- 96 22 194/116 Abnormal  147 95 % None (Room air)   01/25/24 1426 -- -- -- -- -- -- None (Room air)   01/25/24 1400 -- 97 20 185/98 Abnormal  -- 98 % None (Room air)   01/25/24 13:00:05 -- 86 20 194/90 Abnormal  -- 98 % None (Room air)   01/25/24 1255 98 °F (36.7 °C) 93 20 197/96 Abnormal  134 97 % None (Room air)           Pertinent Labs/Diagnostic Test Results:     XR chest portable ICU   Final (01/26 1328)      Small right pleural effusion with adjacent passive atelectasis.   No pneumothorax.        Results from last 7 days   Lab Units 01/25/24  1111   SARS-COV-2  Negative     Results from last 7 days   Lab Units 01/26/24  0602 01/25/24  0916   WBC Thousand/uL 6.10 7.03   HEMOGLOBIN g/dL 13.2 13.3   HEMATOCRIT % 40.1 38.9   PLATELETS Thousands/uL 188 244   NEUTROS ABS Thousands/µL  --  5.19         Results from last 7 days   Lab Units 01/26/24  0602 01/25/24  1522 01/25/24  0920   SODIUM mmol/L 136 136 130*   POTASSIUM mmol/L 4.3 4.2 4.3   CHLORIDE  mmol/L 101 98 95*   CO2 mmol/L 25 27 28   ANION GAP mmol/L 10 11 7   BUN mg/dL 24 17 19   CREATININE mg/dL 1.19 0.93 0.96   EGFR ml/min/1.73sq m 42 57 54   CALCIUM mg/dL 8.8 9.1 9.4   MAGNESIUM mg/dL 2.3 2.0  --      Results from last 7 days   Lab Units 01/26/24  0602 01/25/24  1522 01/25/24  0920   AST U/L 21 19 18   ALT U/L 6* 8 10   ALK PHOS U/L 56 65 65   TOTAL PROTEIN g/dL 6.6 7.0 7.5   ALBUMIN g/dL 3.7 4.1 4.1   TOTAL BILIRUBIN mg/dL 1.17* 1.55* 1.65*         Results from last 7 days   Lab Units 01/26/24  0602 01/25/24  1522 01/25/24  0920   GLUCOSE RANDOM mg/dL 88 97 142*     Results from last 7 days   Lab Units 01/25/24  1738   OSMOLALITY, SERUM mmol/       Results from last 7 days   Lab Units 01/26/24  1247 01/25/24  0916   PROTIME seconds 16.2* 18.5*   INR  1.31* 1.52*   PTT seconds 26 33       Results from last 7 days   Lab Units 01/25/24  1522   DIGOXIN LVL ng/mL 1.2     Results from last 7 days   Lab Units 01/25/24  0916   BNP pg/mL 480*     Results from last 7 days   Lab Units 01/25/24  1738   OSMOLALITY, SERUM mmol/   OSMO UR mmol/*     Results from last 7 days   Lab Units 01/25/24  1738   SODIUM UR  74     Results from last 7 days   Lab Units 01/25/24  1111   INFLUENZA A PCR  Negative   INFLUENZA B PCR  Negative   RSV PCR  Negative         ED Treatment:   Medication Administration from 01/25/2024 1105 to 01/25/2024 1849         Date/Time Order Dose Route Action     01/25/2024 1515 EST acetaminophen (TYLENOL) tablet 975 mg 975 mg Oral Given     01/25/2024 1524 EST lidocaine (LIDODERM) 5 % patch 1 patch 1 patch Topical Medication Applied     01/25/2024 1721 EST enoxaparin (LOVENOX) subcutaneous injection 30 mg 30 mg Subcutaneous Given     01/25/2024 1720 EST atorvastatin (LIPITOR) tablet 10 mg 10 mg Oral Given     01/25/2024 1720 EST digoxin (LANOXIN) tablet 125 mcg 125 mcg Oral Given     01/25/2024 1720 EST furosemide (LASIX) tablet 20 mg 20 mg Oral Given     01/25/2024 1720 EST  losartan (COZAAR) tablet 50 mg 50 mg Oral Given     01/25/2024 1739 EST metoprolol succinate (TOPROL-XL) 24 hr tablet 25 mg 25 mg Oral Given          Past Medical History:   Diagnosis Date    Atrial fibrillation (HCC)     Hypercholesteremia      Present on Admission:  None     Admitting Diagnosis: Closed fracture of multiple ribs of right side, initial encounter [S22.41XA]    Age/Sex: 83 y.o. female    Scheduled Medications:  acetaminophen, 975 mg, Oral, Q8H DEVEN  atorvastatin, 10 mg, Oral, Daily  digoxin, 125 mcg, Oral, Once per day on Sunday Tuesday Thursday Saturday  furosemide, 20 mg, Oral, Every Other Day  gabapentin, 100 mg, Oral, HS  lidocaine, 1 patch, Topical, Daily  losartan, 50 mg, Oral, Daily  metoprolol succinate, 25 mg, Oral, TID  metoprolol tartrate, 100 mg, Oral, Q12H DEVEN  rivaroxaban, 15 mg, Oral, Daily With Dinner  senna-docusate sodium, 1 tablet, Oral, HS  spironolactone, 12.5 mg, Oral, Every Other Day      Continuous IV Infusions:     PRN Meds:  HYDROmorphone, 0.2 mg, Intravenous, Q2H PRN  ondansetron, 4 mg, Intravenous, Q4H PRN  oxyCODONE, 2.5 mg, Oral, Q4H PRN   Or  oxyCODONE, 5 mg, Oral, Q4H PRN        IP CONSULT TO CASE MANAGEMENT  IP CONSULT TO GERONTOLOGY    Network Utilization Review Department  ATTENTION: Please call with any questions or concerns to [phone] and carefully listen to the prompts so that you are directed to the right person. All voicemails are confidential.   For Discharge needs, contact Care Management DC Support Team at [phone] opt. [phone]  Send all requests for admission clinical reviews, approved or denied determinations and any other requests to dedicated fax number below belonging to the campus where the patient is receiving treatment. List of dedicated fax numbers for the Facilities:  FACILITY NAME UR FAX NUMBER   ADMISSION DENIALS (Administrative/Medical Necessity) [phone]   DISCHARGE SUPPORT TEAM (NETWORK) [phone]   [facility] CHILD [facility]  (Maternity/NICU/Pediatrics) [phone]   [facility] [phone]   [facility] [phone]   [facility] [phone]   [facility] [phone]   [facility] [phone]   [facility] [phone]   [facility] [phone]   [facility] [phone]   [facility] [phone]   [facility] [phone]   [facility] [phone]   [facility] [phone]

## 2024-01-26 NOTE — CASE MANAGEMENT
Case Management Assessment & Discharge Planning Note    Patient name Moriah Arreola  Location [facility] - MRN [id number]  : 1940 Date 2024       Current Admission Date: 2024  Current Admission Diagnosis:Multiple rib fractures   Patient Active Problem List    Diagnosis Date Noted    Multiple rib fractures 2024    Type 2 diabetes mellitus with stage 3b chronic kidney disease, without long-term current use of insulin ([facility]) 2023    Type 2 diabetes mellitus with diabetic nephropathy, without long-term current use of insulin ([facility]) 2023    Stage 3b chronic kidney disease ([facility]) 2023    Closed wedge compression fracture of thoracic vertebra ([facility]) 2023    Primary hypertension 2022    Mild protein-calorie malnutrition ([facility]) 2022    PAD (peripheral artery disease) ([facility]) 2022    Acute combined systolic and diastolic congestive heart failure ([facility]) 2022    Atrial fibrillation ([facility]) 2022    JUANITA (acute kidney injury) ([facility]) 2022    Hyperlipemia 2022      LOS (days): 1  Geometric Mean LOS (GMLOS) (days): 3.1  Days to GMLOS:2.1     OBJECTIVE:    Risk of Unplanned Readmission Score: 11.46         Current admission status: Inpatient       Preferred Pharmacy:   [id number] - [address] [address]  Phone: [phone] Fax: [phone]    Primary Care Provider: Jose R Yee MD    Primary Insurance: [facility] REP  Secondary Insurance:     ASSESSMENT:  Active Health Care Proxies    There are no active Health Care Proxies on file.       Advance Directives  Primary Contact: Marian Medina (Friend) [phone]    Readmission Root Cause  30 Day Readmission: No    Patient Information  Admitted from:: Home  Mental Status: Alert  During Assessment patient was accompanied by: Not accompanied during assessment  Assessment information provided by:: Patient  Primary Caregiver:  Self  Support Systems: Self, Friends/neighbors, Family members  County of Residence: [place]  What city do you live in?: [place]  Type of Current Residence: 2 story home  Living Arrangements: Lives Alone  Is patient a ?: No    Activities of Daily Living Prior to Admission  Functional Status: Independent  Completes ADLs independently?: Yes  Ambulates independently?: Yes  Does patient use assisted devices?: No  Does patient currently own DME?: No      Patient Information Continued  Income Source: Pension/halfway  Does patient have prescription coverage?: Yes  Does patient receive dialysis treatments?: No    Housing Stability: Low Risk  (1/26/2024)    Housing Stability Vital Sign     Unable to Pay for Housing in the Last Year: No     Number of Places Lived in the Last Year: 1     Unstable Housing in the Last Year: No   Food Insecurity: No Food Insecurity (1/26/2024)    Hunger Vital Sign     Worried About Running Out of Food in the Last Year: Never true     Ran Out of Food in the Last Year: Never true   Transportation Needs: No Transportation Needs (1/26/2024)    PRAPARE - Transportation     Lack of Transportation (Medical): No     Lack of Transportation (Non-Medical): No   Utilities: Not At Risk (1/26/2024)    [facility] Utilities     Threatened with loss of utilities: No     DISCHARGE DETAILS:    Discharge planning discussed with:: Patient  Freedom of Choice: Yes     CM contacted family/caregiver?: Yes  Were Treatment Team discharge recommendations reviewed with patient/caregiver?: Yes     Were patient/caregiver advised of the risks associated with not following Treatment Team discharge recommendations?: Yes    Contacts  Patient Contacts: Marian Violettejonh (Friend) [phone]  Relationship to Patient:: Family  Contact Method: Phone  Phone Number: [phone]  Reason/Outcome: Continuity of Care, Emergency Contact    Treatment Team Recommendation: Short Term Rehab      CM met with pt to discuss the role of  CM, as well as d/c planning  Pt was recommended for IP rehab.  Cm educated pt on the differences  between acute, TCF, and SNF  Pt is unsure if she wishes to d/c to IP rehab or go home. \  She would like time to think about it and will follow up      CM reviewed d/c planning process including the following: identifying help at home, patient preference for d/c planning needs, Discharge Lounge, [facility] Meds to Bed program, availability of treatment team to discuss questions or concerns patient and/or family may have regarding understanding medications and recognizing signs and symptoms once discharged.  CM also encouraged patient to follow up with all recommended appointments after discharge. Patient advised of importance for patient and family to participate in managing patient’s medical well being.

## 2024-01-26 NOTE — PROGRESS NOTES
[facility]  Progress Note: Critical Care  Name: Moriah Arreola 83 y.o. female I MRN: [id number]  Unit/Bed#: [facility] 207-01 I Date of Admission: 1/25/2024   Date of Service: 1/26/2024 I Hospital Day: 1    Assessment/Plan   Neuro:   No acute issues   Pain control   Acetaminophen 975 mg Q8 hrs scheduled   Gabapentin 100 mg at bedtime   Lidocaine 5% patch daily to right chest wall   Dilaudid 0.2mg IV Q2 hrs PRN breakthorugh pain  Oxycodone 2.5mg Q4 hrs PRN moderate pain  Oxycodone 5mg Q4 hrs PRN sevre painm   Pain is well controlled this AM, do not feel strongly about APS consult at this time    CV:   No acute issues  Hypertension  Cautiously continue home Cozaar 50mg daily   Hold is SBP <110  To d/c if worsening kidney fucntion   Afib   Continue home Digoxin 0.125 mg 4x weekly (Sun, Tues, Thurs, Sat)  Conitue home Metoprlol 100mg tabelt BID, Metoprolol 25mg 24 hr tablet TID  Hold if SBP <110  Holding home Xarelto 15 mg nightly - to consider restarting today  CHF, EF 30%  Continue home diuretic therapy   Spironolactone 12.5 mg every other day  Furosemide 20mg every other day    Pulm:  Respiratory insufficiency 2/2 multiple right sided rib fractures  Encourage use of IS  Pulmonary toilet   Airway clearance protocol   Encourage early ambulation & mobility   Pain control per above neuro plan   Nasal cannula PRN, O2 sdat goal >92%  Small right pleural effusion   Suspect 2/2 recent trauma with right side rib fractures  Will continue to monitor respiratory status, see above plan    GI:   Bowel regimen: Senna 1 tablet at bedtime   Total bilirubin 1.55, repeat down trended at 1.17    :   CKD, Stage 3  Appears at renal baseline   Monitor Is&Os  Current 0.5cc/kg/hr in last 24 hrs   Trend serum Creatinine   0.93 thuis AM from 0.96  F/E/N:   Fluids: No maintenance fluids at this time with hx of CHF  Electrolytes: repleat PRN, goal Mg >2, Phos >3, K >4  Nutrition: Cardiac diet     Heme/Onc:    DVT ppx: Loveonx 30 mg SQ daily, SCDs  No acute issues     Endo:   Hyponatremia on admission, resolved    Will continue to monitor on CMP   Monitor blood glucose with goal 140-180     ID:   No acute issues     MSK/Skin:   Multiple right sided rib fractures  Rib Fracture Protocol   Pain control per above plan  PT/OT eval and treat as indicated, greatly appreciate assistance   Case management for dispo planning   Consult place to gerontology     Disposition: [facility]    ICU Core Measures     A: Assess, Prevent, and Manage Pain Has pain been assessed? Yes  Need for changes to pain regimen? No   B: Both SAT/SAT  N/A   C: Choice of Sedation RASS Goal: 0 Alert and Calm  Need for changes to sedation or analgesia regimen? No   D: Delirium CAM-ICU: Negative   E: Early Mobility  Plan for early mobility? Yes   F: Family Engagement Plan for family engagement today? Yes         Prophylaxis:  VTE VTE covered by:  enoxaparin, Subcutaneous, 30 mg at 01/25/24 1721       Stress Ulcer  not ordered      Significant 24hr Events     24hr events: Patient had an uneventful night and has remained in stable condition. This AM, patient rates right sided rib pain a 4/10, well controlled with current regimen. Tolerating an oral diet. She denies CP, SOB, abdominal pain, nausea, vomiting, constipation. She reports she lives at home alone, however has a neighbor that frequently checks on her.      Subjective     Review of Systems   Respiratory:  Negative for chest tightness and shortness of breath.    Cardiovascular:  Negative for chest pain and leg swelling.   Gastrointestinal:  Negative for abdominal pain, constipation, nausea and vomiting.   Musculoskeletal:  Positive for arthralgias and myalgias.        Objective                            Vitals I/O      Most Recent Min/Max in 24hrs   Temp 97.9 °F (36.6 °C) Temp  Min: 97.5 °F (36.4 °C)  Max: 98 °F (36.7 °C)   Pulse 74 Pulse  Min: 69  Max: 105   Resp 22 Resp  Min: 20  Max: 22   /84 BP   Min: 100/56  Max: 215/118   O2 Sat 94 % SpO2  Min: 92 %  Max: 98 %      Intake/Output Summary (Last 24 hours) at 1/26/2024 0650  Last data filed at 1/26/2024 0100  Gross per 24 hour   Intake --   Output 550 ml   Net -550 ml       Diet Cardiovascular; Cardiac    Invasive Monitoring   none        Physical Exam   Physical Exam  Vitals and nursing note reviewed.   Eyes:      Extraocular Movements: Extraocular movements intact.      Pupils: Pupils are equal, round, and reactive to light.   Skin:     General: Skin is warm and dry.      Capillary Refill: Capillary refill takes less than 2 seconds.   HENT:      Head: Normocephalic and atraumatic.      Mouth/Throat:      Mouth: Mucous membranes are moist.   Neck:      Vascular: No JVD.   Cardiovascular:      Rate and Rhythm: Normal rate. Rhythm irregular.      Pulses: Normal pulses.      Heart sounds: Normal heart sounds.   Musculoskeletal:         General: Normal range of motion.      Right lower leg: No edema.      Left lower leg: No edema.   Abdominal: General: Bowel sounds are normal. There is no distension.      Palpations: Abdomen is soft.      Tenderness: There is no abdominal tenderness. There is no guarding.   Constitutional:       General: She is not in acute distress.     Appearance: She is well-developed and well-nourished. She is not ill-appearing or toxic-appearing.   Pulmonary:      Effort: Pulmonary effort is normal.      Breath sounds: Normal breath sounds.   Neurological:      General: No focal deficit present.      Mental Status: She is alert and oriented to person, place and time.      Cranial Nerves: No facial asymmetry.            Diagnostic Studies    EKG: atrial fibrillation   Imaging:  I have personally reviewed pertinent reports.       Medications:  Scheduled PRN   acetaminophen, 975 mg, Q8H DEVEN  atorvastatin, 10 mg, Daily  digoxin, 125 mcg, Once per day on Sunday Tuesday Thursday Saturday  enoxaparin, 30 mg, Q24H  furosemide, 20 mg, Every Other  Day  gabapentin, 100 mg, HS  lidocaine, 1 patch, Daily  losartan, 50 mg, Daily  metoprolol succinate, 25 mg, TID  metoprolol tartrate, 100 mg, Q12H DEVEN  senna-docusate sodium, 1 tablet, HS  spironolactone, 12.5 mg, Every Other Day      HYDROmorphone, 0.2 mg, Q2H PRN  ondansetron, 4 mg, Q4H PRN  oxyCODONE, 2.5 mg, Q4H PRN   Or  oxyCODONE, 5 mg, Q4H PRN       Continuous          Labs:  CBC    Recent Labs     01/25/24  0916 01/26/24  0602   WBC 7.03 6.10   HGB 13.3 13.2   HCT 38.9 40.1    188     BMP    Recent Labs     01/25/24  0920 01/25/24  1522   SODIUM 130* 136   K 4.3 4.2   CL 95* 98   CO2 28 27   AGAP 7 11   BUN 19 17   CREATININE 0.96 0.93   CALCIUM 9.4 9.1       Coags    Recent Labs     01/25/24  0916   INR 1.52*   PTT 33        Additional Electrolytes  Recent Labs     01/25/24  1522   MG 2.0          Blood Gas    No recent results  No recent results LFTs  Recent Labs     01/25/24  0920 01/25/24  1522   ALT 10 8   AST 18 19   ALKPHOS 65 65   ALB 4.1 4.1   TBILI 1.65* 1.55*       Infectious  No recent results  Glucose  Recent Labs     01/25/24  0920 01/25/24  1522   GLUC 142* 97               Critical Care Time Statement: Upon my evaluation, this patient had a high probability of imminent or life-threatening deterioration due to multiple right rib fractures, Afib, CHF, which required my direct attention, intervention, and personal management.  I spent a total of 25 minutes directly providing critical care services, including interpretation of complex medical databases, evaluating for the presence of life-threatening injuries or illnesses, complex medical decision making (to support/prevent further life-threatening deterioration)., and interpretation of hemodynamic data. This time is exclusive of procedures, teaching, family meetings, and any prior time recorded by providers other than myself.      Owen Silverio PA-C

## 2024-01-26 NOTE — ASSESSMENT & PLAN NOTE
Continue home regimen  Digoxin 0.125 mg 4x weekly (Sun, Tues, Thjavy, Sat)  Metoprolol 100mg tablet BID, Metoprolol 25mg 24 hr tablet TID  Hold if SBP <110  Xarelto 15 mg nightly  Continue home

## 2024-01-26 NOTE — PROGRESS NOTES
"Progress Note - Trauma ICU Transfer   and [facility]   Moriah Arreola 83 y.o. female [id number]   Unit/Bed#: [facility] 207-01 Encounter: [id number]     Assessment & Plan   Summary of Diagnosed Injuries:     Multiple right sided rib fractures   Multimodal Pain control  Acetaminophen 975 mg Q8 hrs scheduled   Gabapentin 100 mg at bedtime   Lidocaine 5% patch daily to right chest wall   Dilaudid 0.2mg IV Q2 hrs PRN breakthorugh pain  Oxycodone 2.5mg Q4 hrs PRN moderate pain  Oxycodone 5mg Q4 hrs PRN sevre pain  Encourage use of IS  Pulmonary toilet  Airway clearance protocol   Encourage early ambulation and mobility   Nasal cannula prn, O2 goal >92%  Hypertension   Continue home Losartan 50 mg daily   Continue to monitor hemodynamics   Afib   Continue home regimen  Digoxin 0.125 mg 4x weekly (Sun, Tues, Thurs, Sat)  Metoprolol 100mg tablet BID, Metoprolol 25mg 24 hr tablet TID  Hold if SBP <110  Xarelto 15 mg nightly  CHF, EF 30%  Continue home diuretic therapy  Spironolactone 12.5 mg every other day  Furosemide 20mg every other day  Small right pleural effusion   Suspect 2/2 recent trauma with right side rib fractures  Will continue to monitor respiratory status, see above plan  CXR this AM without concern for worsening effusion, however official read pending  CKD, Stage III  Appears at renal baseline   Monitor Is & Os  Trend serum Cr, AM BMP ordered       VTE Prophylaxis:Reason for no pharmacologic prophylaxis Patient on Xarelto.      Disposition: [facility] under trauma team service     Code status:  Level 1 - Full Code    Consultants: IP CONSULT TO CASE MANAGEMENT  IP CONSULT TO GERONTOLOGY     Subjective   Mechanism of Injury:Fall     HPI/Last 24 hour events:   HPI per Dr. Agarwal 1/25/2024: \"Moriah Arreola is a 83 y.o. female who presents with with a past medical history of A-fib on digoxin, metoprolol, and Xarelto, CHF on Lasix, spironolactone, and losartan, CKD, DM 2, and HTN who presents as a transfer from an " "outside hospital after a fall being found to have right 7-11 minimally displaced rib fractures.  She reports that on 1/24 while in the bathroom she turned around too quickly and lost her balance falling on her right side directly into the tub.  Denies head strike and loss of consciousness.  She was able to get up but had persistent pain on her right side which brought her to the ED.  She denies fevers, chills, nausea, vomiting, and dysuria.\"    Overnight, patient remained stable. This AM, patient reports she is doing well. She rates her right rib pain a 4/10 and is well controlled with current regimen. She denies CP, SOB, abdominal pain, nausea, constipation. She is tolerating an oral diet. She is able to reach 500 on IS. Re-educated patient on proper IS use.     Reason for ICU admission: Multiple right sided rib fractures with respiratory insufficiency     Summary of ICU clinical course: Patient presented to the ICU yesterday s/p fall that occurred x 2 days ago resulting in multiple right sided rib fractures.  Patient was treated will multimodal pain regimen and encouraged incentive spirometry use. She was placed on rib fracture protocol. Patient was also continued on outpatient home medications for chronic conditions. She has remained hemodynamically stable. O2 saturation 94%+ without nasal cannula use. Her pain is well controlled. She is being followed by geriatrics. She is tolerating an oral diet. She is stable for transfer to [facility] under trauma service care for further management.     Recent or scheduled procedures: none    Outstanding/pending diagnostics: none       Objective   Vitals:   Temp:  [97.6 °F (36.4 °C)-97.9 °F (36.6 °C)] 97.8 °F (36.6 °C)  HR:  [] 68  Resp:  [20-22] 22  BP: (100-194)/() 113/66    I/O         01/24 0701  01/25 0700 01/25 0701  01/26 0700 01/26 0701 01/27 0700    Urine (mL/kg/hr)  550 500 (1.8)    Total Output  550 500    Net  -550 -500           Unmeasured Urine " Occurrence  1 x              Physical Exam:   GENERAL APPEARANCE: in no acute distress, sitting in hospital chair   NEURO: alert, Oriented x3, GCS 15, no focal deficits, sensation intact to all 4 extremities  HEENT: NCAT, PERRL, EOM's intact, moist mucous membranes  CV: regular rate and rhythm, no murmur/rub/gallop appreciated. 2+ dorsalis pedis and radial pulses bilaterally. <2 sec cap refill.  LUNGS: clear to auscultation bilaterally, no wheezing/rales/rhonchi, no tachypnea   GI: normal bowel sounds, non tender to palpation, no distention, no guarding or rebound tenderness   : voiding  MSK: 5/5  strength bilaterally. 5/5 plantar and dorsi flexion to lower extremities bilaterally.   SKIN: warm and dry.      Invasive Devices       Peripheral Intravenous Line  Duration             Peripheral IV 01/25/24 Left Antecubital 1 day                   Rationale for remaining devices: IV access during hospitalization     1. Before the illness or injury that brought you to the Emergency, did you need someone to help you on a regular basis? 0=No   2. Since the illness or injury that brought you to the Emergency, have you needed more help than usual to take care of yourself? 0= No   3. Have you been hospitalized for one or more nights during the past 6 months (excluding a stay in the Emergency Department)? 1= Yes   4. In general, do you see well? 0= Yes   5. In general, do you have serious problems with your memory? 0=No   6. Do you take more than three different medications everyday? 1=Yes   TOTAL   2     Did you order a geriatric consult if the score was 2 or greater?: yes     PIC Score  PIC Pain Score: 3 (1/26/2024 11:07 AM)  PIC Incentive Spirometry Score: 2 (1/26/2024  6:00 AM)  PIC Cough Description: 2 (1/26/2024  6:00 AM)  PIC Total Score: 7 (1/26/2024  6:00 AM)       If the Total PIC Score </=5, did you consult APS and evaluate patient for further intervention?: no      Pain:    Incentive Spirometry  Cough  3 =  Controlled  4 = Above goal volume 3 = Strong  2 = Moderate  3 = Goal to alert volume 2 = Weak  1 = Severe  2 = Below alert volume 1 = Absent     1 = Unable to perform IS      Lab Results: Results: I have personally reviewed all pertinent laboratory/tests results, BMP/CMP:   Lab Results   Component Value Date    SODIUM 136 01/26/2024    K 4.3 01/26/2024     01/26/2024    CO2 25 01/26/2024    BUN 24 01/26/2024    CREATININE 1.19 01/26/2024    CALCIUM 8.8 01/26/2024    AST 21 01/26/2024    ALT 6 (L) 01/26/2024    ALKPHOS 56 01/26/2024    EGFR 42 01/26/2024   , and CBC:   Lab Results   Component Value Date    WBC 6.10 01/26/2024    HGB 13.2 01/26/2024    HCT 40.1 01/26/2024    MCV 96 01/26/2024     01/26/2024    RBC 4.19 01/26/2024    MCH 31.5 01/26/2024    MCHC 32.9 01/26/2024    RDW 12.8 01/26/2024    MPV 11.1 01/26/2024       Imaging Results: I have personally reviewed pertinent reports.    Chest Xray(s): N/A   FAST exam(s): N/A   CT Scan(s): 1/25 CT CAP  Positive for:  1.  Acute minimally displaced fractures of right-sided ribs 7 through 11.  2.  Small right pleural effusion.  3.  Incidental thyroid nodule(s) for which nonemergent thyroid ultrasound is recommended.    1/25 CTH: negative   Additional Xray(s): none      Other Studies: none    Code Status: Level 1 - Full Code       Patient seen and evaluated by Critical Care today and deemed to be appropriate for transfer to Med Surg. Spoke to Hayley Kellogg from Trauma service regarding transfer. Critical Care can be contacted via [employer] with any questions or concerns.

## 2024-01-27 LAB
ANION GAP SERPL CALCULATED.3IONS-SCNC: 8 MMOL/L
BUN SERPL-MCNC: 29 MG/DL (ref 5–25)
CALCIUM SERPL-MCNC: 9.3 MG/DL (ref 8.4–10.2)
CHLORIDE SERPL-SCNC: 96 MMOL/L (ref 96–108)
CO2 SERPL-SCNC: 28 MMOL/L (ref 21–32)
CREAT SERPL-MCNC: 1.24 MG/DL (ref 0.6–1.3)
ERYTHROCYTE [DISTWIDTH] IN BLOOD BY AUTOMATED COUNT: 12.6 % (ref 11.6–15.1)
GFR SERPL CREATININE-BSD FRML MDRD: 40 ML/MIN/1.73SQ M
GLUCOSE SERPL-MCNC: 117 MG/DL (ref 65–140)
HCT VFR BLD AUTO: 40.1 % (ref 34.8–46.1)
HGB BLD-MCNC: 13.3 G/DL (ref 11.5–15.4)
MAGNESIUM SERPL-MCNC: 2.2 MG/DL (ref 1.9–2.7)
MCH RBC QN AUTO: 31.2 PG (ref 26.8–34.3)
MCHC RBC AUTO-ENTMCNC: 33.2 G/DL (ref 31.4–37.4)
MCV RBC AUTO: 94 FL (ref 82–98)
PLATELET # BLD AUTO: 224 THOUSANDS/UL (ref 149–390)
PMV BLD AUTO: 10.3 FL (ref 8.9–12.7)
POTASSIUM SERPL-SCNC: 4.2 MMOL/L (ref 3.5–5.3)
RBC # BLD AUTO: 4.26 MILLION/UL (ref 3.81–5.12)
SODIUM SERPL-SCNC: 132 MMOL/L (ref 135–147)
WBC # BLD AUTO: 6.04 THOUSAND/UL (ref 4.31–10.16)

## 2024-01-27 PROCEDURE — 80048 BASIC METABOLIC PNL TOTAL CA: CPT

## 2024-01-27 PROCEDURE — 99232 SBSQ HOSP IP/OBS MODERATE 35: CPT | Performed by: NURSE PRACTITIONER

## 2024-01-27 PROCEDURE — 83735 ASSAY OF MAGNESIUM: CPT

## 2024-01-27 PROCEDURE — 85027 COMPLETE CBC AUTOMATED: CPT

## 2024-01-27 RX ADMIN — RIVAROXABAN 15 MG: 15 TABLET, FILM COATED ORAL at 16:11

## 2024-01-27 RX ADMIN — METOPROLOL SUCCINATE 25 MG: 25 TABLET, EXTENDED RELEASE ORAL at 08:22

## 2024-01-27 RX ADMIN — ACETAMINOPHEN 975 MG: 325 TABLET, FILM COATED ORAL at 21:25

## 2024-01-27 RX ADMIN — SENNOSIDES, DOCUSATE SODIUM 1 TABLET: 8.6; 5 TABLET ORAL at 21:25

## 2024-01-27 RX ADMIN — LIDOCAINE 5% 1 PATCH: 700 PATCH TOPICAL at 08:22

## 2024-01-27 RX ADMIN — DIGOXIN 125 MCG: 125 TABLET ORAL at 08:22

## 2024-01-27 RX ADMIN — ACETAMINOPHEN 975 MG: 325 TABLET, FILM COATED ORAL at 05:37

## 2024-01-27 RX ADMIN — ATORVASTATIN CALCIUM 10 MG: 10 TABLET, FILM COATED ORAL at 08:23

## 2024-01-27 RX ADMIN — FUROSEMIDE 20 MG: 20 TABLET ORAL at 08:22

## 2024-01-27 RX ADMIN — ACETAMINOPHEN 975 MG: 325 TABLET, FILM COATED ORAL at 12:08

## 2024-01-27 RX ADMIN — LOSARTAN POTASSIUM 50 MG: 50 TABLET, FILM COATED ORAL at 08:21

## 2024-01-27 RX ADMIN — GABAPENTIN 100 MG: 100 CAPSULE ORAL at 21:25

## 2024-01-27 RX ADMIN — METOPROLOL TARTRATE 100 MG: 50 TABLET ORAL at 08:22

## 2024-01-27 RX ADMIN — METOPROLOL SUCCINATE 25 MG: 25 TABLET, EXTENDED RELEASE ORAL at 16:11

## 2024-01-27 RX ADMIN — METOPROLOL SUCCINATE 25 MG: 25 TABLET, EXTENDED RELEASE ORAL at 21:25

## 2024-01-27 RX ADMIN — METOPROLOL TARTRATE 100 MG: 50 TABLET ORAL at 21:25

## 2024-01-27 NOTE — PROGRESS NOTES
"[facility]  Progress Note  Name: Moriah Arreola I  MRN: [id number]  [facility] Date of Admission: 1/25/2024   Date of Service: 1/27/2024 I Hospital Day: 2    Assessment/Plan   Type 2 diabetes mellitus with stage 3b chronic kidney disease, without long-term current use of insulin (HCC)  Assessment & Plan  Lab Results   Component Value Date    HGBA1C 6.0 (H) 04/27/2016       No results for input(s): \"POCGLU\" in the last 72 hours.    Blood Sugar Average: Last 72 hrs:        Atrial fibrillation (HCC)  Assessment & Plan  Continue home regimen  Digoxin 0.125 mg 4x weekly (Sun, Tues, Thurs, Sat)  Metoprolol 100mg tablet BID, Metoprolol 25mg 24 hr tablet TID  Hold if SBP <110  Xarelto 15 mg nightly  Continue home     * Multiple rib fractures  Assessment & Plan  Multimodal Pain control  Acetaminophen 975 mg Q8 hrs scheduled   Gabapentin 100 mg at bedtime   Lidocaine 5% patch daily to right chest wall   Dilaudid 0.2mg IV Q2 hrs PRN breakthorugh pain  Oxycodone 2.5mg Q4 hrs PRN moderate pain  Oxycodone 5mg Q4 hrs PRN sevre pain  Encourage use of IS  Pulmonary toilet  Airway clearance protocol   Encourage early ambulation and mobility   Nasal cannula prn, O2 goal >92%             Bowel Regimen: Senna - Colace  VTE Prophylaxis:Xaraleto     Disposition: home vs rehab    Subjective   Chief Complaint: rib soreness    Subjective: \" My doctor wants me to drink water three times a day\"     Objective   Vitals:   Temp:  [97.3 °F (36.3 °C)-97.7 °F (36.5 °C)] 97.7 °F (36.5 °C)  HR:  [62-89] 62  Resp:  [16-18] 16  BP: (127-143)/(61-88) 127/61    I/O         01/25 0701 01/26 0700 01/26 0701 01/27 0700 01/27 0701 01/28 0700    P.O.  160 240    Total Intake(mL/kg)  160 (3.6) 240 (5.4)    Urine (mL/kg/hr) 550 1600 (1.5)     Total Output 550 1600     Net -550 -1440 +240           Unmeasured Urine Occurrence 1 x  1 x    Unmeasured Stool Occurrence   0 x             Physical Exam: "   GENERAL APPEARANCE: pleasant and comfortable  NEURO: GCS - 15  HEENT: EOM's intact  CV: RRR< no complaints of chest pain  LUNGS: CTA bilaterally  GI: toletaing a diet  : voiding  MSK: moving around in bed  SKIN: warm and dry    Invasive Devices       Peripheral Intravenous Line  Duration             Peripheral IV 01/25/24 Left Antecubital 2 days                     PIC Score  PIC Pain Score: 3 (1/27/2024 12:00 AM)  PIC Incentive Spirometry Score: 2 (1/27/2024 12:00 AM)  PIC Cough Description: 2 (1/27/2024 12:00 AM)  PIC Total Score: 7 (1/27/2024 12:00 AM)       If the Total PIC Score </=5, did you consult APS and evaluate patient for further intervention?: no      Pain:    Incentive Spirometry  Cough  3 = Controlled  4 = Above goal volume 3 = Strong  2 = Moderate  3 = Goal to alert volume 2 = Weak  1 = Severe  2 = Below alert volume 1 = Absent     1 = Unable to perform IS         Lab Results:    Latest Reference Range & Units 01/27/24 05:02   Sodium 135 - 147 mmol/L 132 (L)   Potassium 3.5 - 5.3 mmol/L 4.2   Chloride 96 - 108 mmol/L 96   Carbon Dioxide 21 - 32 mmol/L 28   ANION GAP mmol/L 8   BUN 5 - 25 mg/dL 29 (H)   Creatinine 0.60 - 1.30 mg/dL 1.24   GLUCOSE 65 - 140 mg/dL 117   Calcium 8.4 - 10.2 mg/dL 9.3   GFR, Calculated ml/min/1.73sq m 40   MAGNESIUM 1.9 - 2.7 mg/dL 2.2   WBC 4.31 - 10.16 Thousand/uL 6.04   RBC 3.81 - 5.12 Million/uL 4.26   Hemoglobin 11.5 - 15.4 g/dL 13.3   Hematocrit 34.8 - 46.1 % 40.1   MCV 82 - 98 fL 94   MCH 26.8 - 34.3 pg 31.2   MCHC 31.4 - 37.4 g/dL 33.2   RDW 11.6 - 15.1 % 12.6   Platelet Count 149 - 390 Thousands/uL 224   MPV 8.9 - 12.7 fL 10.3   (L): Data is abnormally low  (H): Data is abnormally high  Imaging:  none  Other Studies: none

## 2024-01-27 NOTE — ASSESSMENT & PLAN NOTE
"Lab Results   Component Value Date    HGBA1C 6.0 (H) 04/27/2016       No results for input(s): \"POCGLU\" in the last 72 hours.    Blood Sugar Average: Last 72 hrs:      "

## 2024-01-27 NOTE — UTILIZATION REVIEW
Date: 1/27       Day 3: Has surpassed a 2nd midnight with active treatments and services, which include aggressive pulmonary toilet  (Very self motivated with ISS -pulling 250 )  ambulation.  Saturating well on room air.    Pain control per Geriatric pain protocol:  Tylenol 975mg Q8H scheduled  Roxicodone 2.5mg Q4H PRN moderate pain  Roxicodone 5mg Q4H PRN severe pain  Dilaudid 0.2mg Q4H PRN  lidocaine patch topically, daily

## 2024-01-27 NOTE — PLAN OF CARE
Problem: Prexisting or High Potential for Compromised Skin Integrity  Goal: Skin integrity is maintained or improved  Description: INTERVENTIONS:  - Identify patients at risk for skin breakdown  - Assess and monitor skin integrity  - Assess and monitor nutrition and hydration status  - Monitor labs   - Assess for incontinence   - Turn and reposition patient  - Assist with mobility/ambulation  - Relieve pressure over bony prominences  - Avoid friction and shearing  - Provide appropriate hygiene as needed including keeping skin clean and dry  - Evaluate need for skin moisturizer/barrier cream  - Collaborate with interdisciplinary team   - Patient/family teaching  - Consider wound care consult   Outcome: Progressing     Problem: Nutrition/Hydration-ADULT  Goal: Nutrient/Hydration intake appropriate for improving, restoring or maintaining nutritional needs  Description: Monitor and assess patient's nutrition/hydration status for malnutrition. Collaborate with interdisciplinary team and initiate plan and interventions as ordered.  Monitor patient's weight and dietary intake as ordered or per policy. Utilize nutrition screening tool and intervene as necessary. Determine patient's food preferences and provide high-protein, high-caloric foods as appropriate.     INTERVENTIONS:  - Monitor oral intake, urinary output, labs, and treatment plans  - Assess nutrition and hydration status and recommend course of action  - Evaluate amount of meals eaten  - Assist patient with eating if necessary   - Allow adequate time for meals  - Recommend/ encourage appropriate diets, oral nutritional supplements, and vitamin/mineral supplements  - Order, calculate, and assess calorie counts as needed  - Recommend, monitor, and adjust tube feedings and TPN/PPN based on assessed needs  - Assess need for intravenous fluids  - Provide specific nutrition/hydration education as appropriate  - Include patient/family/caregiver in decisions related to  nutrition  Outcome: Progressing     Problem: PAIN - ADULT  Goal: Verbalizes/displays adequate comfort level or baseline comfort level  Description: Interventions:  - Encourage patient to monitor pain and request assistance  - Assess pain using appropriate pain scale  - Administer analgesics based on type and severity of pain and evaluate response  - Implement non-pharmacological measures as appropriate and evaluate response  - Consider cultural and social influences on pain and pain management  - Notify physician/advanced practitioner if interventions unsuccessful or patient reports new pain  Outcome: Progressing     Problem: DISCHARGE PLANNING  Goal: Discharge to home or other facility with appropriate resources  Description: INTERVENTIONS:  - Identify barriers to discharge w/patient and caregiver  - Arrange for needed discharge resources and transportation as appropriate  - Identify discharge learning needs (meds, wound care, etc.)  - Arrange for interpretive services to assist at discharge as needed  - Refer to Case Management Department for coordinating discharge planning if the patient needs post-hospital services based on physician/advanced practitioner order or complex needs related to functional status, cognitive ability, or social support system  Outcome: Progressing     Problem: Knowledge Deficit  Goal: Patient/family/caregiver demonstrates understanding of disease process, treatment plan, medications, and discharge instructions  Description: Complete learning assessment and assess knowledge base.  Interventions:  - Provide teaching at level of understanding  - Provide teaching via preferred learning methods  Outcome: Progressing
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
## 2024-01-28 LAB
INR PPP: 1.73 (ref 0.84–1.19)
PROTHROMBIN TIME: 19.9 SECONDS (ref 11.6–14.5)

## 2024-01-28 PROCEDURE — 85610 PROTHROMBIN TIME: CPT | Performed by: NURSE PRACTITIONER

## 2024-01-28 PROCEDURE — 99232 SBSQ HOSP IP/OBS MODERATE 35: CPT | Performed by: PHYSICIAN ASSISTANT

## 2024-01-28 RX ADMIN — SENNOSIDES, DOCUSATE SODIUM 1 TABLET: 8.6; 5 TABLET ORAL at 21:22

## 2024-01-28 RX ADMIN — ATORVASTATIN CALCIUM 10 MG: 10 TABLET, FILM COATED ORAL at 08:24

## 2024-01-28 RX ADMIN — GABAPENTIN 100 MG: 100 CAPSULE ORAL at 21:21

## 2024-01-28 RX ADMIN — SPIRONOLACTONE 12.5 MG: 25 TABLET ORAL at 08:24

## 2024-01-28 RX ADMIN — METOPROLOL SUCCINATE 25 MG: 25 TABLET, EXTENDED RELEASE ORAL at 21:22

## 2024-01-28 RX ADMIN — ACETAMINOPHEN 975 MG: 325 TABLET, FILM COATED ORAL at 05:05

## 2024-01-28 RX ADMIN — METOPROLOL SUCCINATE 25 MG: 25 TABLET, EXTENDED RELEASE ORAL at 08:24

## 2024-01-28 RX ADMIN — ACETAMINOPHEN 975 MG: 325 TABLET, FILM COATED ORAL at 13:03

## 2024-01-28 RX ADMIN — DIGOXIN 125 MCG: 125 TABLET ORAL at 08:23

## 2024-01-28 RX ADMIN — METOPROLOL SUCCINATE 25 MG: 25 TABLET, EXTENDED RELEASE ORAL at 17:24

## 2024-01-28 RX ADMIN — RIVAROXABAN 15 MG: 15 TABLET, FILM COATED ORAL at 17:24

## 2024-01-28 RX ADMIN — METOPROLOL TARTRATE 100 MG: 50 TABLET ORAL at 08:23

## 2024-01-28 RX ADMIN — LOSARTAN POTASSIUM 50 MG: 50 TABLET, FILM COATED ORAL at 08:24

## 2024-01-28 RX ADMIN — ACETAMINOPHEN 975 MG: 325 TABLET, FILM COATED ORAL at 21:21

## 2024-01-28 RX ADMIN — METOPROLOL TARTRATE 100 MG: 50 TABLET ORAL at 21:21

## 2024-01-28 RX ADMIN — LIDOCAINE 5% 1 PATCH: 700 PATCH TOPICAL at 08:28

## 2024-01-28 NOTE — ASSESSMENT & PLAN NOTE
- Patient with chronic history of hypertension.  - Continue current medication regimen.  - Resume home medication therapy and discharge as appropriate.  - Outpatient follow-up routine.

## 2024-01-28 NOTE — UTILIZATION REVIEW
NOTIFICATION OF INPATIENT ADMISSION   AUTHORIZATION REQUEST   SERVICING FACILITY:   Formerly [facility], later [facility]  Address: [address] [place] [address]  Tax ID: [id number]  NPI: [id number] ATTENDING PROVIDER:  Attending Name and NPI#: Ricardo Braxton Do [[id number]]  Address: [address] [place] [address]  Phone: [phone]   ADMISSION INFORMATION:  Place of Service: Inpatient [facility]  Place of Service Code: 21  Inpatient Admission Date/Time: 1/25/24  3:20 PM  Discharge Date/Time: No discharge date for patient encounter.  Admitting Diagnosis Code/Description:  Closed fracture of multiple ribs of right side, initial encounter [S22.41XA]     UTILIZATION REVIEW CONTACT:  Gabriele Drake Utilization   Network Utilization Review Department  Phone: [phone]  Fax: [phone]  Email: [email or web address]  Contact for approvals/pending authorizations, clinical reviews, and discharge.     PHYSICIAN ADVISORY SERVICES:  Medical Necessity Denial & Tbri-yw-Jvvl Review  Phone: [phone]  Fax: [phone]  Email: [email or web address]     DISCHARGE SUPPORT TEAM:  For Patients Discharge Needs & Updates  Phone: [phone] opt. 2 Fax: [phone]  Email: [email or web address]

## 2024-01-28 NOTE — CASE MANAGEMENT
Case Management Discharge Planning Note    Patient name Moriah Arreola  Location [facility] MRN [id number]  : 1940 Date 2024       Current Admission Date: 2024  Current Admission Diagnosis:Multiple rib fractures   Patient Active Problem List    Diagnosis Date Noted    Multiple rib fractures 2024    Type 2 diabetes mellitus with stage 3b chronic kidney disease, without long-term current use of insulin ([facility]) 2023    Type 2 diabetes mellitus with diabetic nephropathy, without long-term current use of insulin (Formerly [facility]) 2023    Stage 3b chronic kidney disease ([facility]) 2023    Closed wedge compression fracture of thoracic vertebra ([facility]) 2023    Primary hypertension 2022    Mild protein-calorie malnutrition ([facility]ly [facility]) 2022    PAD (peripheral artery disease) ([facility]) 2022    Acute combined systolic and diastolic congestive heart failure ([facility]) 2022    Atrial fibrillation ([facility]) 2022    JUANITA (acute kidney injury) ([facility]) 2022    Hyperlipemia 2022      LOS (days): 3  Geometric Mean LOS (GMLOS) (days): 3.1  Days to GMLOS:0.2     OBJECTIVE:  Risk of Unplanned Readmission Score: 13.4         Current admission status: Inpatient   Preferred Pharmacy:   [id number] [place] [address] [address]  Phone: [phone] Fax: [phone]    Primary Care Provider: Jose R Yee MD    Primary Insurance: [facility] RE[facility]  Secondary Insurance:     DISCHARGE DETAILS:    Discharge planning discussed with:: Patient  Freedom of Choice: Yes  Comments - Freedom of Choice: Discussed FOC  CM contacted family/caregiver?: Yes (CM left for friend, Carlee, per patient's request)  Were Treatment Team discharge recommendations reviewed with patient/caregiver?: Yes  Did patient/caregiver verbalize understanding of patient care needs?: N/A- going to facility  Were patient/caregiver advised of the risks associated with  not following Treatment Team discharge recommendations?: Yes    Other Referral/Resources/Interventions Provided:  Interventions: Short Term Rehab  Referral Comments: This CM explained different levels of STR, patient would like referrals placed that are closer to her zip code, [place] referral entered, as well as blanket referral in [facility].  TC to friend Carlee, to discuss, per patient's request, left VM to call this CM back

## 2024-01-28 NOTE — ASSESSMENT & PLAN NOTE
- Status post fall with the below noted injuries.  - Fall precautions.  - Geriatric Medicine consultation for evaluation, medication review and recommendations.  - PT and OT evaluation and treatment as indicated.  - Case Management consultation for disposition planning.

## 2024-01-28 NOTE — ASSESSMENT & PLAN NOTE
- Multiple acute minimally displaced right-sided rib fractures (7th-11th), present on admission.  - Continue rib fracture protocol.  - Continue to encourage incentive spirometer use and adequate pulmonary hygiene.  Currently pulling 300 mL on I.S.  - PIC score is 6.  - Appreciate APS evaluation and recommendations.  - Continue multimodal analgesic regimen.  - PT and OT evaluation and treatment as indicated.  - Outpatient follow-up in the trauma clinic for re-evaluation in approximately 2 weeks.

## 2024-01-28 NOTE — PLAN OF CARE
Problem: Prexisting or High Potential for Compromised Skin Integrity  Goal: Skin integrity is maintained or improved  Description: INTERVENTIONS:  - Identify patients at risk for skin breakdown  - Assess and monitor skin integrity  - Assess and monitor nutrition and hydration status  - Monitor labs   - Assess for incontinence   - Turn and reposition patient  - Assist with mobility/ambulation  - Relieve pressure over bony prominences  - Avoid friction and shearing  - Provide appropriate hygiene as needed including keeping skin clean and dry  - Evaluate need for skin moisturizer/barrier cream  - Collaborate with interdisciplinary team   - Patient/family teaching  - Consider wound care consult   Outcome: Progressing     Problem: Nutrition/Hydration-ADULT  Goal: Nutrient/Hydration intake appropriate for improving, restoring or maintaining nutritional needs  Description: Monitor and assess patient's nutrition/hydration status for malnutrition. Collaborate with interdisciplinary team and initiate plan and interventions as ordered.  Monitor patient's weight and dietary intake as ordered or per policy. Utilize nutrition screening tool and intervene as necessary. Determine patient's food preferences and provide high-protein, high-caloric foods as appropriate.     INTERVENTIONS:  - Monitor oral intake, urinary output, labs, and treatment plans  - Assess nutrition and hydration status and recommend course of action  - Evaluate amount of meals eaten  - Assist patient with eating if necessary   - Allow adequate time for meals  - Recommend/ encourage appropriate diets, oral nutritional supplements, and vitamin/mineral supplements  - Order, calculate, and assess calorie counts as needed  - Recommend, monitor, and adjust tube feedings and TPN/PPN based on assessed needs  - Assess need for intravenous fluids  - Provide specific nutrition/hydration education as appropriate  - Include patient/family/caregiver in decisions related to  nutrition  Outcome: Progressing     Problem: PAIN - ADULT  Goal: Verbalizes/displays adequate comfort level or baseline comfort level  Description: Interventions:  - Encourage patient to monitor pain and request assistance  - Assess pain using appropriate pain scale  - Administer analgesics based on type and severity of pain and evaluate response  - Implement non-pharmacological measures as appropriate and evaluate response  - Consider cultural and social influences on pain and pain management  - Notify physician/advanced practitioner if interventions unsuccessful or patient reports new pain  Outcome: Progressing

## 2024-01-28 NOTE — PROGRESS NOTES
"[facility]  Progress Note  Name: Moriah Arreola I  MRN: [id number]  Unit/Bed#: [facility] 610-01 I Date of Admission: 1/25/2024   Date of Service: 1/28/2024 I Hospital Day: 3    Assessment/Plan   Fall  Assessment & Plan  - Status post fall with the below noted injuries.  - Fall precautions.  - Geriatric Medicine consultation for evaluation, medication review and recommendations.  - PT and OT evaluation and treatment as indicated.  - Case Management consultation for disposition planning.    * Multiple rib fractures  Assessment & Plan  - Multiple acute minimally displaced right-sided rib fractures (7th-11th), present on admission.  - Continue rib fracture protocol.  - Continue to encourage incentive spirometer use and adequate pulmonary hygiene.  Currently pulling 300 mL on I.S.  - PIC score is 6.  - Appreciate APS evaluation and recommendations.  - Continue multimodal analgesic regimen.  - PT and OT evaluation and treatment as indicated.  - Outpatient follow-up in the trauma clinic for re-evaluation in approximately 2 weeks.    Atrial fibrillation (HCC)  Assessment & Plan  - Patient with chronic history of atrial fibrillation.  - Continue current medication regimen.  - Continue to monitor heart rate.  - Outpatient follow-up per routine.      Primary hypertension  Assessment & Plan  - Patient with chronic history of hypertension.  - Continue current medication regimen.  - Resume home medication therapy and discharge as appropriate.  - Outpatient follow-up routine.    Type 2 diabetes mellitus with stage 3b chronic kidney disease, without long-term current use of insulin (HCC)  Assessment & Plan  Lab Results   Component Value Date    HGBA1C 6.0 (H) 04/27/2016       No results for input(s): \"POCGLU\" in the last 72 hours.    Blood Sugar Average: Last 72 hrs:    - Patient with chronic history of type 2 diabetes mellitus in setting of stage III CKD.  - Continue subcutaneous insulin " "regimen.  - Goal blood glucose during hospital encounter of 1 40-1 80.  - Resume home medication regimen on discharge as appropriate.  - Outpatient follow-up routine.             Bowel Regimen: On Senokot-S.  VTE Prophylaxis: Anticoagulated with Xarelto.    Disposition: Continue current level of care.  Anticipate discharge to postacute care facility for rehab.  Continue therapy evaluation and treatment as indicated.  Case management following for disposition planning.    Subjective   Chief Complaint: \"I feel well.\"    Subjective: Patient is doing well today.  She notes she has very little pain and only with movement or deep breaths.  She is using her incentive spirometer, but admits that she is not doing great with it.  She is tolerating diet without any nausea or vomiting.  She has no new complaints at this time.  She denied any shortness of breath or difficulty breathing.     Objective   Vitals:   Temp:  [97.7 °F (36.5 °C)-98.2 °F (36.8 °C)] 97.9 °F (36.6 °C)  HR:  [56-77] 72  Resp:  [16-18] 16  BP: (109-155)/(57-91) 127/84    I/O         01/26 0701  01/27 0700 01/27 0701  01/28 0700 01/28 0701  01/29 0700    P.O. 160 716 660    Total Intake(mL/kg) 160 (3.6) 716 (16.1) 660 (14.8)    Urine (mL/kg/hr) 1600 (1.5) 825 (0.8) 1225 (3.7)    Stool  0 0    Total Output 2453 805 3890    Net -1440 -109 -565           Unmeasured Urine Occurrence  3 x 3 x    Unmeasured Stool Occurrence  0 x 0 x             Physical Exam:   GENERAL APPEARANCE: Patient in no acute distress.  HEENT: NCAT; EOMs intact; Mucous membranes moist  CV: Regular rate and rhythm; no murmur/gallops/rubs appreciated.  CHEST / LUNGS: Clear to auscultation; no wheezes/rales/rhonci.  Chest wall tenderness at this time.  ABD: NABS; soft; non-distended; non-tender.  : Voiding spontaneously.  EXT: +2 pulses bilaterally upper & lower extremities; no edema.  NEURO: GCS 15; no focal neurologic deficits; neurovascularly intact.  SKIN: Warm, dry and well perfused; no " rash; no jaundice.    Invasive Devices       Peripheral Intravenous Line  Duration             Peripheral IV 01/25/24 Left Antecubital 3 days                     PIC Score  PIC Pain Score: 3 (1/28/2024  8:30 AM)  PIC Incentive Spirometry Score: 2 (1/28/2024  5:05 AM)  PIC Cough Description: 1 (1/28/2024  5:05 AM)  PIC Total Score: 6 (1/28/2024  5:05 AM)       If the Total PIC Score </=5, did you consult APS and evaluate patient for further intervention?: N/A      Pain:    Incentive Spirometry  Cough  3 = Controlled  4 = Above goal volume 3 = Strong  2 = Moderate  3 = Goal to alert volume 2 = Weak  1 = Severe  2 = Below alert volume 1 = Absent     1 = Unable to perform IS         Lab Results: Results: I have personally reviewed all pertinent laboratory/tests results  Imaging: I have personally reviewed pertinent reports.     Other Studies: N/A     Talon Pickard PA-C  1/28/2024  1:12 PM

## 2024-01-28 NOTE — ASSESSMENT & PLAN NOTE
"Lab Results   Component Value Date    HGBA1C 6.0 (H) 04/27/2016       No results for input(s): \"POCGLU\" in the last 72 hours.    Blood Sugar Average: Last 72 hrs:    - Patient with chronic history of type 2 diabetes mellitus in setting of stage III CKD.  - Continue subcutaneous insulin regimen.  - Goal blood glucose during hospital encounter of 1 40-1 80.  - Resume home medication regimen on discharge as appropriate.  - Outpatient follow-up routine.  "

## 2024-01-28 NOTE — ASSESSMENT & PLAN NOTE
- Patient with chronic history of atrial fibrillation.  - Continue current medication regimen.  - Continue to monitor heart rate.  - Outpatient follow-up per routine.

## 2024-01-29 LAB
ANION GAP SERPL CALCULATED.3IONS-SCNC: 11 MMOL/L
BUN SERPL-MCNC: 32 MG/DL (ref 5–25)
CALCIUM SERPL-MCNC: 9.3 MG/DL (ref 8.4–10.2)
CHLORIDE SERPL-SCNC: 98 MMOL/L (ref 96–108)
CO2 SERPL-SCNC: 25 MMOL/L (ref 21–32)
CREAT SERPL-MCNC: 1.15 MG/DL (ref 0.6–1.3)
GFR SERPL CREATININE-BSD FRML MDRD: 44 ML/MIN/1.73SQ M
GLUCOSE SERPL-MCNC: 125 MG/DL (ref 65–140)
POTASSIUM SERPL-SCNC: 4.9 MMOL/L (ref 3.5–5.3)
SODIUM SERPL-SCNC: 134 MMOL/L (ref 135–147)

## 2024-01-29 PROCEDURE — 99233 SBSQ HOSP IP/OBS HIGH 50: CPT | Performed by: INTERNAL MEDICINE

## 2024-01-29 PROCEDURE — 97530 THERAPEUTIC ACTIVITIES: CPT

## 2024-01-29 PROCEDURE — 99232 SBSQ HOSP IP/OBS MODERATE 35: CPT | Performed by: SURGERY

## 2024-01-29 PROCEDURE — 80048 BASIC METABOLIC PNL TOTAL CA: CPT | Performed by: PHYSICIAN ASSISTANT

## 2024-01-29 PROCEDURE — 97116 GAIT TRAINING THERAPY: CPT

## 2024-01-29 RX ORDER — OLANZAPINE 5 MG/1
5 TABLET, ORALLY DISINTEGRATING ORAL
Status: DISCONTINUED | OUTPATIENT
Start: 2024-01-29 | End: 2024-01-30 | Stop reason: HOSPADM

## 2024-01-29 RX ORDER — OLANZAPINE 10 MG/1
10 TABLET, ORALLY DISINTEGRATING ORAL
Status: DISCONTINUED | OUTPATIENT
Start: 2024-01-29 | End: 2024-01-29

## 2024-01-29 RX ORDER — LANOLIN ALCOHOL/MO/W.PET/CERES
3 CREAM (GRAM) TOPICAL
Status: DISCONTINUED | OUTPATIENT
Start: 2024-01-29 | End: 2024-01-30 | Stop reason: HOSPADM

## 2024-01-29 RX ADMIN — METOPROLOL TARTRATE 100 MG: 50 TABLET ORAL at 08:41

## 2024-01-29 RX ADMIN — ACETAMINOPHEN 975 MG: 325 TABLET, FILM COATED ORAL at 13:27

## 2024-01-29 RX ADMIN — FUROSEMIDE 20 MG: 20 TABLET ORAL at 08:41

## 2024-01-29 RX ADMIN — LIDOCAINE 5% 1 PATCH: 700 PATCH TOPICAL at 08:41

## 2024-01-29 RX ADMIN — ACETAMINOPHEN 975 MG: 325 TABLET, FILM COATED ORAL at 21:18

## 2024-01-29 RX ADMIN — ACETAMINOPHEN 975 MG: 325 TABLET, FILM COATED ORAL at 04:56

## 2024-01-29 RX ADMIN — ATORVASTATIN CALCIUM 10 MG: 10 TABLET, FILM COATED ORAL at 08:40

## 2024-01-29 RX ADMIN — OLANZAPINE 5 MG: 5 TABLET, ORALLY DISINTEGRATING ORAL at 23:04

## 2024-01-29 RX ADMIN — RIVAROXABAN 15 MG: 15 TABLET, FILM COATED ORAL at 17:09

## 2024-01-29 RX ADMIN — METOPROLOL SUCCINATE 25 MG: 25 TABLET, EXTENDED RELEASE ORAL at 17:09

## 2024-01-29 RX ADMIN — METOPROLOL SUCCINATE 25 MG: 25 TABLET, EXTENDED RELEASE ORAL at 08:41

## 2024-01-29 RX ADMIN — SENNOSIDES, DOCUSATE SODIUM 1 TABLET: 8.6; 5 TABLET ORAL at 21:18

## 2024-01-29 RX ADMIN — LOSARTAN POTASSIUM 50 MG: 50 TABLET, FILM COATED ORAL at 08:40

## 2024-01-29 RX ADMIN — MELATONIN 3 MG: at 23:04

## 2024-01-29 RX ADMIN — GABAPENTIN 100 MG: 100 CAPSULE ORAL at 21:18

## 2024-01-29 NOTE — PLAN OF CARE
Problem: PHYSICAL THERAPY ADULT  Goal: Performs mobility at highest level of function for planned discharge setting.  See evaluation for individualized goals.  Description: Treatment/Interventions: Functional transfer training, LE strengthening/ROM, Therapeutic exercise, Endurance training, Patient/family training, Equipment eval/education, Bed mobility, Gait training, Spoke to nursing, Spoke to case management, OT          See flowsheet documentation for full assessment, interventions and recommendations.  Outcome: Progressing  Note: Prognosis: Good  Problem List: Decreased strength, Decreased endurance, Impaired balance, Decreased mobility, Pain  Assessment: Pt was able to progress to household distance ambualtion this session, ambulating with & without AD as noted above, requiring assist for both tasks to ensure safety.  pt did demonstrate x2 minor LOB without RW, and demonstrated increased lateral sway compared to trial with RW.  Pt will benefit from further gait and balance training to maximize independence with LRAD as she returns to baseline independence.  Continue to recommend rehab at d/c given these deficits & fact that pt lives alone at baseline.  Barriers to Discharge: Decreased caregiver support     Rehab Resource Intensity Level, PT: II (Moderate Resource Intensity)    See flowsheet documentation for full assessment.

## 2024-01-29 NOTE — PROGRESS NOTES
"[facility]  Progress Note  Name: Moriah Arreola I  MRN: [id number]  Unit/Bed#: [facility] 610-01 I Date of Admission: 1/25/2024   Date of Service: 1/29/2024 I Hospital Day: 4    Assessment/Plan   Fall  Assessment & Plan  - Status post fall with the below noted injuries.  - Fall precautions.  - Geriatric Medicine consultation for evaluation, medication review and recommendations.  - PT and OT evaluation and treatment as indicated.  - Case Management consultation for disposition planning.    Type 2 diabetes mellitus with stage 3b chronic kidney disease, without long-term current use of insulin (HCC)  Assessment & Plan  Lab Results   Component Value Date    HGBA1C 6.0 (H) 04/27/2016       No results for input(s): \"POCGLU\" in the last 72 hours.    Blood Sugar Average: Last 72 hrs:    - Patient with chronic history of type 2 diabetes mellitus in setting of stage III CKD.  - Continue subcutaneous insulin regimen.  - Goal blood glucose during hospital encounter of 1 40-1 80.  - Resume home medication regimen on discharge as appropriate.  - Outpatient follow-up routine.    Primary hypertension  Assessment & Plan  - Patient with chronic history of hypertension.  - Continue current medication regimen.  - Resume home medication therapy and discharge as appropriate.  - Outpatient follow-up routine.    Atrial fibrillation (HCC)  Assessment & Plan  - Patient with chronic history of atrial fibrillation.  - Continue current medication regimen.  - Continue to monitor heart rate.  - Outpatient follow-up per routine.      * Multiple rib fractures  Assessment & Plan  - Multiple acute minimally displaced right-sided rib fractures (7th-11th), present on admission.  - Continue rib fracture protocol.  - Continue to encourage incentive spirometer use and adequate pulmonary hygiene.  Currently pulling 300 mL on I.S.  - PIC score is 6.  - Appreciate APS evaluation and recommendations.  - Continue multimodal " "analgesic regimen.  - PT and OT evaluation and treatment as indicated.  - Outpatient follow-up in the trauma clinic for re-evaluation in approximately 2 weeks.             Bowel Regimen: Senna and Coalce  VTE Prophylaxis:Xaraleto     Disposition: rehab    Subjective   Chief Complaint: none    Subjective: \"I feel pretty good\"     Objective   Vitals:   Temp:  [96 °F (35.6 °C)-97.9 °F (36.6 °C)] 96 °F (35.6 °C)  HR:  [66-86] 66  Resp:  [15-18] 18  BP: (127-162)/() 152/81    I/O         01/27 0701  01/28 0700 01/28 0701  01/29 0700 01/29 0701 01/30 0700    P.O. 716 660 240    Total Intake(mL/kg) 716 (16.1) 660 (14.8) 240 (5.4)    Urine (mL/kg/hr) 825 (0.8) 1375 (1.3) 400 (1.2)    Stool 0 0     Total Output 825 1375 400    Net -109 -715 -160           Unmeasured Urine Occurrence 3 x 4 x 1 x    Unmeasured Stool Occurrence 0 x 0 x              Physical Exam:   GENERAL APPEARANCE: comfortable  NEURO: GCS- 15  HEENT: EOm's intact  CV: RRR, no complaints of chest pain  LUNGS: CTA bilaterally, no shortness of breath  GI: tolerating a diet, good appetite  : voiding  MSK: moving all extremities  SKIN: warm and dry    Invasive Devices       Peripheral Intravenous Line  Duration             Peripheral IV 01/29/24 Right Antecubital <1 day                     PIC Score  PIC Pain Score: 3 (1/29/2024  1:27 PM)  PIC Incentive Spirometry Score: 2 (1/29/2024  8:00 AM)  PIC Cough Description: 2 (1/29/2024  8:00 AM)  PIC Total Score: 7 (1/29/2024  8:00 AM)       If the Total PIC Score </=5, did you consult APS and evaluate patient for further intervention?: no      Pain:    Incentive Spirometry  Cough  3 = Controlled  4 = Above goal volume 3 = Strong  2 = Moderate  3 = Goal to alert volume 2 = Weak  1 = Severe  2 = Below alert volume 1 = Absent     1 = Unable to perform IS         Lab Results:    Latest Reference Range & Units 01/29/24 04:46   Sodium 135 - 147 mmol/L 134 (L)   Potassium 3.5 - 5.3 mmol/L 4.9   Chloride 96 - 108 " mmol/L 98   Carbon Dioxide 21 - 32 mmol/L 25   ANION GAP mmol/L 11   BUN 5 - 25 mg/dL 32 (H)   Creatinine 0.60 - 1.30 mg/dL 1.15   GLUCOSE 65 - 140 mg/dL 125   Calcium 8.4 - 10.2 mg/dL 9.3   GFR, Calculated ml/min/1.73sq m 44   (L): Data is abnormally low  (H): Data is abnormally high  Imaging: none  Other Studies: none

## 2024-01-29 NOTE — PHYSICAL THERAPY NOTE
Physical Therapy Progress Note      01/29/24 0930   PT Last Visit   PT Visit Date 01/29/24   Note Type   Note Type Treatment   Pain Assessment   Pain Score No Pain   Restrictions/Precautions   Other Precautions Cognitive;Chair Alarm;Bed Alarm;Pain;Fall Risk   Subjective   Subjective Pt encountered supine in bed, pleasant and agreeable to treatment.  Reports controlled pain overall & no new complaints with activity.  Does endorse dizziness, but states this is not unusal for her.  Pt tends to repeat questions at times, and stated she had a RW at home unprompted twice during session.   Bed Mobility   Supine to Sit 5  Supervision   Additional items Assist x 1;HOB elevated;Increased time required;Bedrails   Transfers   Sit to Stand 5  Supervision   Additional items Assist x 1;Impulsive   Stand to Sit 5  Supervision   Additional items Assist x 1;Armrests;Increased time required;Verbal cues   Ambulation/Elevation   Gait pattern Short stride;Foward flexed;Inconsistent hollie;Decreased foot clearance;Narrow BRIAN;Improper Weight shift;Poor UE support   Gait Assistance 4  Minimal assist   Additional items Assist x 1   Assistive Device None;Rolling walker   Distance 40' x 2 without AD, then 40' with RW   Balance   Static Sitting Fair   Static Standing Fair -   Ambulatory Poor +   Activity Tolerance   Activity Tolerance Patient tolerated treatment well;Patient limited by fatigue   Nurse Made Aware SUSAN Antony   Assessment   Prognosis Good   Problem List Decreased strength;Decreased endurance;Impaired balance;Decreased mobility;Pain   Assessment Pt was able to progress to household distance ambualtion this session, ambulating with & without AD as noted above, requiring assist for both tasks to ensure safety.  pt did demonstrate x2 minor LOB without RW, and demonstrated increased lateral sway compared to trial with RW.  Pt will benefit from further gait and balance training to maximize independence with LRAD as she returns to  baseline independence.  Continue to recommend rehab at d/c given these deficits & fact that pt lives alone at baseline.   Barriers to Discharge Decreased caregiver support   Goals   Patient Goals to get better   STG Expiration Date 02/09/24   PT Treatment Day 1   Plan   Treatment/Interventions Functional transfer training;LE strengthening/ROM;Elevations;Therapeutic exercise;Endurance training;Patient/family training;Equipment eval/education;Bed mobility;Gait training   Progress Progressing toward goals   PT Frequency 3-5x/wk   Discharge Recommendation   Rehab Resource Intensity Level, PT II (Moderate Resource Intensity)   Equipment Recommended Walker   Walker Package Recommended Wheeled walker   AM-PAC Basic Mobility Inpatient   Turning in Flat Bed Without Bedrails 4   Lying on Back to Sitting on Edge of Flat Bed Without Bedrails 3   Moving Bed to Chair 3   Standing Up From Chair Using Arms 3   Walk in Room 3   Climb 3-5 Stairs With Railing 3   Basic Mobility Inpatient Raw Score 19   Basic Mobility Standardized Score 42.48   Highest Level Of Mobility   JH-HLM Goal 6: Walk 10 steps or more   JH-HLM Achieved 7: Walk 25 feet or more       Luis M Ferguson PTA    An [facility] Basic Mobility Raw Score less than 17 suggests pt would benefit from post acute rehab.  Please also refer to the recommendation of the Physical Therapist for safe discharge planning.

## 2024-01-29 NOTE — ASSESSMENT & PLAN NOTE
"Lab Results   Component Value Date    HGBA1C 6.0 (H) 04/27/2016       No results for input(s): \"POCGLU\" in the last 72 hours.    Blood Sugar Average: Last 72 hrs:  ?  - Patient with chronic history of type 2 diabetes mellitus in setting of stage III CKD.  - Continue subcutaneous insulin regimen.  - Goal blood glucose during hospital encounter of 1 40-1 80.  - Resume home medication regimen on discharge as appropriate.  - Outpatient follow-up routine.  "

## 2024-01-29 NOTE — CASE MANAGEMENT
Case Management Discharge Planning Note    Patient name Moriah Arreola  Location [facility] MRN [id number]  : 1940 Date 2024       Current Admission Date: 2024  Current Admission Diagnosis:Multiple rib fractures   Patient Active Problem List    Diagnosis Date Noted    Fall 2024    Multiple rib fractures 2024    Type 2 diabetes mellitus with stage 3b chronic kidney disease, without long-term current use of insulin ([facility]) 2023    Type 2 diabetes mellitus with diabetic nephropathy, without long-term current use of insulin ([facility]) 2023    Stage 3b chronic kidney disease ([facility]) 2023    Closed wedge compression fracture of thoracic vertebra ([facility]) 2023    Primary hypertension 2022    Mild protein-calorie malnutrition ([facility]) 2022    PAD (peripheral artery disease) ([facility]) 2022    Acute combined systolic and diastolic congestive heart failure ([facility]) 2022    Atrial fibrillation ([facility]) 2022    JUANITA (acute kidney injury) ([facility]) 2022    Hyperlipemia 2022      LOS (days): 4  Geometric Mean LOS (GMLOS) (days): 3.1  Days to GMLOS:-0.7     OBJECTIVE:  Risk of Unplanned Readmission Score: 13.57         Current admission status: Inpatient   Preferred Pharmacy:   [id number] - [place] [address] [address]  Phone: [phone] Fax: [phone]    Primary Care Provider: Jose R Yee MD    Primary Insurance: [email or web address]  REP  Secondary Insurance:     DISCHARGE DETAILS:    [facility] can't accept.   Pt being followed by [facility]

## 2024-01-29 NOTE — CASE MANAGEMENT
Case Management Discharge Planning Note    Patient name Moriah Arreola  Location [facility] 610/[facility]-01 MRN [id number]  : 1940 Date 2024       Current Admission Date: 2024  Current Admission Diagnosis:Multiple rib fractures   Patient Active Problem List    Diagnosis Date Noted    Fall 2024    Multiple rib fractures 2024    Type 2 diabetes mellitus with stage 3b chronic kidney disease, without long-term current use of insulin ([facility]) 2023    Type 2 diabetes mellitus with diabetic nephropathy, without long-term current use of insulin ([facility]) 2023    Stage 3b chronic kidney disease ([facility]) 2023    Closed wedge compression fracture of thoracic vertebra ([facility]) 2023    Primary hypertension 2022    Mild protein-calorie malnutrition ([facility]) 2022    PAD (peripheral artery disease) ([facility]) 2022    Acute combined systolic and diastolic congestive heart failure ([facility]) 2022    Atrial fibrillation ([facility]) 2022    JUANITA (acute kidney injury) ([facility]) 2022    Hyperlipemia 2022      LOS (days): 4  Geometric Mean LOS (GMLOS) (days): 3.1  Days to GMLOS:-0.9     OBJECTIVE:  Risk of Unplanned Readmission Score: 13.6         Current admission status: Inpatient   Preferred Pharmacy:   [id number] - [address] [address]  Phone: [phone] Fax: [phone]    Primary Care Provider: Jose R Yee MD    Primary Insurance: [employer] REP  Secondary Insurance:     DISCHARGE DETAILS:    CM met with pt to inform her that [facility] and [facility] can accept  Pt prefers [facility]. Cm will submit for auth as soon as bed is confirmed with them

## 2024-01-29 NOTE — PROGRESS NOTES
Progress Note - Geriatric Medicine   Moriah Arreola 83 y.o. female MRN: [id number]  Unit/Bed#: [facility] 610-01 Encounter: [id number]      Assessment/Plan:    Ambulatory dysfunction with fall  -reportedly mechanical fall at home 1/25/24  -In setting of chronic systemic anticoagulation with Xarelto (A-fib)   -injuries as outlined below  -Does not require use of assist device for ambulation at baseline  -no prior hx recurrent falls  -remains high risk future falls - cont fall precautions   -encourage good body mechanics and assist with all transfers  -Recommend home fall risk assessment and personal fall alert system on returning home  -PT and OT following     Multiple right-sided rib fractures (7-11)  -s/p fall as outlined above   -CT chest abdomen pelvis on admission reports acute minimally displaced fractures of right-sided ribs 7 through 11 with small right pleural effusion  -No pneumothorax reported  -Saturating well on room air and pain appears well controlled   -continue aggressive pulm toilet      Acute pain due to trauma  -cont acute multimodal pain control  -consider d/c dilaudid and oxycodone 5mg for non-use  -recommend  bowel regimen to prevent and treat constipation due to increased risk with acute pain and pain medications     Hyponatremia  -[Na] 130 on admission  -Now resolved, monitor electrolytes closely and avoid rapid and drastic fluctuations of no more than 8-12 pts/24H     Hypertensive urgency  -/96 on initial presentation  -Likely multifactorial including acute pain and stressors related to injuries/hospitalization  -Now markedly improved, continue optimization of hemodynamics     A-fib with RVR  -Home regimen includes rate control with digoxin as well as both metoprolol tartrate and metoprolol succinate  -Chronic systemic anticoagulant with Xarelto, denies history of easy bruising or uncontrolled bleeding  -Continue close outpatient follow with PCP and cardiology for ongoing titration and  medication management     Chronic combined systolic and diastolic CHF  -EF 25-to 30% by echo 8/2022  -Maintained on Lasix and spironolactone every other day chronically as outpatient  -Monitor electrolytes,weights and volume status closely  -Continue healthy lifestyle choices and secondary risk factor modifications  -Continue close outpatient follow-up with Cardiology     Cognitive screening  -Alert and oriented, endorses mild age-related forgetfulness which she does not feel impacts her daily life   -Resides home alone and independent with ADLs and IADLs  -No prior cognitive testing on record for review  -CTH obtained on admission images personally viewed, reveals moderate diffuse chronic microangiopathic changes as well as large area encephalomalacia right temporal/temporoparietal area   -TSH WNL at 2.05, no recent B12 on record review, given concern for age-related forgetfulness recommend checking with next routine labs  -Patient encouraged to stay physically, socially, and cognitively active and engaged to maintain cognitive acuity  -Continue use of sensory assistive devices such as corrective lenses appropriate times reduce risk of uncorrected sensory impairment contributing to isolation, confusion, encephalopathy and more precipitous cognitive decline     Impaired Vision  -recommend use of corrective lenses at all appropriate times  -Consider large font for printed materials provided to patient     Frailty syndrome in geriatric patient   -Clinical frailty scale stage IV, vulnerable  -Multifactoral including age, A-fib with RVR, chronic combined systolic and diastolic CHF, CKD and multitude of additional chronic medical comorbidities now with fall and acute traumatic injury superimposed in elderly individual with limited physiologic and metabolic reserves increasing sensitivity to even seemingly mild additional metabolic derangements/stressors  -Continue optimization of chronic medical conditions and address  "acute metabolic derangements as arise  -Monitor for and treat any underlying anxiety/mood/depression symptoms as may impact response to therapies as well as overall sense of wellbeing and quality of life  -Continue psychosocial supports  -Ensure that treatments and interventions continue to align with patient's wishes and goals of care     High risk developing delirium   -continue delirium precautions  -encourage normal sleep/wake cycle  -ensure that pain is well controlled -geriatric pain protocol as above  -monitor for fecal and urinary retention which may precipitate delirium  -provide frequent reorientation and redirection as indicated and appropriate  -redirect unwanted behaviors as first line    Care coordination: rounded with Cassia (RN)    Subjective:     Moriah is seen and examined at bedside where she is sitting resting comfortably, she reports pain is well controlled, appetite is good and she reports feeling well overall. She offers no new acute complaints.     Review of Systems   Constitutional:  Negative for chills and fever.   HENT: Negative.     Eyes: Negative.    Respiratory: Negative.  Negative for shortness of breath.         Right ribcage pain with deep inspiration    Cardiovascular: Negative.    Gastrointestinal: Negative.    Genitourinary: Negative.    Musculoskeletal:  Positive for gait problem.   Skin: Negative.    Neurological:  Positive for dizziness (chronic and unchanged from basline \).   Hematological: Negative.    Psychiatric/Behavioral: Negative.  Negative for sleep disturbance.    All other systems reviewed and are negative.    Objective:     Vitals: Blood pressure 152/81, pulse 66, temperature (!) 96 °F (35.6 °C), resp. rate 18, height 4' 7\" (1.397 m), weight 44.6 kg (98 lb 6.4 oz), SpO2 95%.,Body mass index is 22.87 kg/m².      Intake/Output Summary (Last 24 hours) at 1/29/2024 1252  Last data filed at 1/29/2024 1158  Gross per 24 hour   Intake 240 ml   Output 550 ml   Net -310 ml "     Current Medications: Reviewed    Physical Exam:   Physical Exam  Vitals and nursing note reviewed.   Constitutional:       General: She is not in acute distress.     Appearance: She is not toxic-appearing.   HENT:      Head: Normocephalic and atraumatic.      Nose: Nose normal.      Mouth/Throat:      Mouth: Mucous membranes are moist.      Comments: Edentulous   Eyes:      General:         Right eye: No discharge.         Left eye: No discharge.      Conjunctiva/sclera: Conjunctivae normal.   Neck:      Comments: Trachea midline, phonation normal  Cardiovascular:      Rate and Rhythm: Normal rate.      Pulses: Normal pulses.   Pulmonary:      Effort: Pulmonary effort is normal. No respiratory distress.      Breath sounds: No wheezing.   Abdominal:      General: There is no distension.      Palpations: Abdomen is soft.      Tenderness: There is no abdominal tenderness.   Musculoskeletal:      Cervical back: Neck supple.      Comments: Reduced overall muscle mass    Skin:     General: Skin is warm and dry.      Comments: Thin and friable    Neurological:      Mental Status: She is alert.      Comments: Awake and alert, oriented, answers questions appropriately   Psychiatric:         Mood and Affect: Mood normal.         Behavior: Behavior normal.      Comments: Pleasant and cooperative         Invasive Devices       Peripheral Intravenous Line  Duration             Peripheral IV 01/29/24 Right Antecubital <1 day                  Lab Results:     I have personally reviewed pertinent lab results including the following:    Results from last 7 days   Lab Units 01/27/24  0502 01/26/24  0602 01/25/24  0916   WBC Thousand/uL 6.04 6.10 7.03   HEMOGLOBIN g/dL 13.3 13.2 13.3   HEMATOCRIT % 40.1 40.1 38.9   PLATELETS Thousands/uL 224 188 244   NEUTROS PCT %  --   --  74   MONOS PCT %  --   --  8   EOS PCT %  --   --  1     Results from last 7 days   Lab Units 01/29/24  0446 01/27/24  0502 01/26/24  0602 01/25/24  1522  01/25/24  0920   POTASSIUM mmol/L 4.9 4.2 4.3 4.2 4.3   CHLORIDE mmol/L 98 96 101 98 95*   CO2 mmol/L 25 28 25 27 28   BUN mg/dL 32* 29* 24 17 19   CREATININE mg/dL 1.15 1.24 1.19 0.93 0.96   CALCIUM mg/dL 9.3 9.3 8.8 9.1 9.4   ALK PHOS U/L  --   --  56 65 65   ALT U/L  --   --  6* 8 10   AST U/L  --   --  21 19 18     I have personally reviewed the following imaging study reports in PACS:    No new imaging overnight

## 2024-01-30 VITALS
SYSTOLIC BLOOD PRESSURE: 136 MMHG | HEART RATE: 82 BPM | OXYGEN SATURATION: 99 % | BODY MASS INDEX: 22.77 KG/M2 | TEMPERATURE: 97.8 F | RESPIRATION RATE: 18 BRPM | HEIGHT: 55 IN | WEIGHT: 98.4 LBS | DIASTOLIC BLOOD PRESSURE: 89 MMHG

## 2024-01-30 LAB
ANION GAP SERPL CALCULATED.3IONS-SCNC: 12 MMOL/L
BASOPHILS # BLD AUTO: 0.05 THOUSANDS/ÂΜL (ref 0–0.1)
BASOPHILS NFR BLD AUTO: 1 % (ref 0–1)
BUN SERPL-MCNC: 32 MG/DL (ref 5–25)
CALCIUM SERPL-MCNC: 9.2 MG/DL (ref 8.4–10.2)
CHLORIDE SERPL-SCNC: 95 MMOL/L (ref 96–108)
CO2 SERPL-SCNC: 25 MMOL/L (ref 21–32)
CREAT SERPL-MCNC: 1.08 MG/DL (ref 0.6–1.3)
EOSINOPHIL # BLD AUTO: 0.08 THOUSAND/ÂΜL (ref 0–0.61)
EOSINOPHIL NFR BLD AUTO: 1 % (ref 0–6)
ERYTHROCYTE [DISTWIDTH] IN BLOOD BY AUTOMATED COUNT: 12.8 % (ref 11.6–15.1)
GFR SERPL CREATININE-BSD FRML MDRD: 47 ML/MIN/1.73SQ M
GLUCOSE SERPL-MCNC: 110 MG/DL (ref 65–140)
HCT VFR BLD AUTO: 41.7 % (ref 34.8–46.1)
HGB BLD-MCNC: 13.7 G/DL (ref 11.5–15.4)
IMM GRANULOCYTES # BLD AUTO: 0.03 THOUSAND/UL (ref 0–0.2)
IMM GRANULOCYTES NFR BLD AUTO: 0 % (ref 0–2)
LYMPHOCYTES # BLD AUTO: 1.69 THOUSANDS/ÂΜL (ref 0.6–4.47)
LYMPHOCYTES NFR BLD AUTO: 21 % (ref 14–44)
MCH RBC QN AUTO: 31.1 PG (ref 26.8–34.3)
MCHC RBC AUTO-ENTMCNC: 32.9 G/DL (ref 31.4–37.4)
MCV RBC AUTO: 95 FL (ref 82–98)
MONOCYTES # BLD AUTO: 0.76 THOUSAND/ÂΜL (ref 0.17–1.22)
MONOCYTES NFR BLD AUTO: 10 % (ref 4–12)
NEUTROPHILS # BLD AUTO: 5.33 THOUSANDS/ÂΜL (ref 1.85–7.62)
NEUTS SEG NFR BLD AUTO: 67 % (ref 43–75)
NRBC BLD AUTO-RTO: 0 /100 WBCS
PLATELET # BLD AUTO: 263 THOUSANDS/UL (ref 149–390)
PMV BLD AUTO: 10.4 FL (ref 8.9–12.7)
POTASSIUM SERPL-SCNC: 4.2 MMOL/L (ref 3.5–5.3)
RBC # BLD AUTO: 4.41 MILLION/UL (ref 3.81–5.12)
SODIUM SERPL-SCNC: 132 MMOL/L (ref 135–147)
WBC # BLD AUTO: 7.94 THOUSAND/UL (ref 4.31–10.16)

## 2024-01-30 PROCEDURE — 99232 SBSQ HOSP IP/OBS MODERATE 35: CPT | Performed by: INTERNAL MEDICINE

## 2024-01-30 PROCEDURE — NC001 PR NO CHARGE: Performed by: SURGERY

## 2024-01-30 PROCEDURE — 80048 BASIC METABOLIC PNL TOTAL CA: CPT

## 2024-01-30 PROCEDURE — 85025 COMPLETE CBC W/AUTO DIFF WBC: CPT

## 2024-01-30 PROCEDURE — 99238 HOSP IP/OBS DSCHRG MGMT 30/<: CPT | Performed by: SURGERY

## 2024-01-30 RX ORDER — OLANZAPINE 5 MG/1
5 TABLET, ORALLY DISINTEGRATING ORAL
Qty: 10 TABLET | Refills: 0 | Status: SHIPPED | OUTPATIENT
Start: 2024-01-30

## 2024-01-30 RX ORDER — OXYCODONE HYDROCHLORIDE 5 MG/1
TABLET ORAL
Qty: 20 TABLET | Refills: 0 | Status: SHIPPED | OUTPATIENT
Start: 2024-01-30

## 2024-01-30 RX ORDER — ACETAMINOPHEN 325 MG/1
975 TABLET ORAL EVERY 8 HOURS SCHEDULED
Start: 2024-01-30

## 2024-01-30 RX ORDER — LIDOCAINE 50 MG/G
1 PATCH TOPICAL DAILY
Start: 2024-01-31

## 2024-01-30 RX ORDER — GABAPENTIN 100 MG/1
100 CAPSULE ORAL
Qty: 45 CAPSULE | Refills: 0 | Status: SHIPPED | OUTPATIENT
Start: 2024-01-30

## 2024-01-30 RX ORDER — LANOLIN ALCOHOL/MO/W.PET/CERES
3 CREAM (GRAM) TOPICAL
Qty: 10 TABLET | Refills: 0 | Status: SHIPPED | OUTPATIENT
Start: 2024-01-30

## 2024-01-30 RX ORDER — AMOXICILLIN 250 MG
1 CAPSULE ORAL
Start: 2024-01-30

## 2024-01-30 RX ADMIN — METOPROLOL SUCCINATE 25 MG: 25 TABLET, EXTENDED RELEASE ORAL at 09:02

## 2024-01-30 RX ADMIN — LOSARTAN POTASSIUM 50 MG: 50 TABLET, FILM COATED ORAL at 09:02

## 2024-01-30 RX ADMIN — SPIRONOLACTONE 12.5 MG: 25 TABLET ORAL at 09:02

## 2024-01-30 RX ADMIN — ACETAMINOPHEN 975 MG: 325 TABLET, FILM COATED ORAL at 14:13

## 2024-01-30 RX ADMIN — METOPROLOL TARTRATE 100 MG: 50 TABLET ORAL at 09:02

## 2024-01-30 RX ADMIN — DIGOXIN 125 MCG: 125 TABLET ORAL at 09:02

## 2024-01-30 RX ADMIN — LIDOCAINE 5% 1 PATCH: 700 PATCH TOPICAL at 09:01

## 2024-01-30 RX ADMIN — ATORVASTATIN CALCIUM 10 MG: 10 TABLET, FILM COATED ORAL at 09:01

## 2024-01-30 NOTE — PROGRESS NOTES
Patient:  MAYA ENAMORADO    MRN:  [id number]    Wei Request ID:  [id number]    Level of care reserved:  Skilled Nursing Facility    Partner Reserved:  [place] [place] [address] [phone]    Clinical needs requested:    Geography searched:  10 miles around [id number]    Start of Service:    Request sent:  1:39pm EST on 1/28/2024 by Samantha Vasquez    Partner reserved:  10:18am EST on 1/30/2024 by Spencer Parra    Choice list shared:  3:28pm EST on 1/29/2024 by Spencer Parra

## 2024-01-30 NOTE — PROGRESS NOTES
"[facility]  Progress Note  Name: Moriah Arreola I  MRN: [id number]  Unit/Bed#: [facility] 610-01 I Date of Admission: 1/25/2024   Date of Service: 1/30/2024 I Hospital Day: 5    Assessment/Plan   Fall  Assessment & Plan  - Status post fall with the below noted injuries.  - Fall precautions.  - Geriatric Medicine consultation for evaluation, medication review and recommendations.  - PT and OT evaluation and treatment as indicated.  - Case Management consultation for disposition planning.    Type 2 diabetes mellitus with stage 3b chronic kidney disease, without long-term current use of insulin (HCC)  Assessment & Plan  Lab Results   Component Value Date    HGBA1C 6.0 (H) 04/27/2016       No results for input(s): \"POCGLU\" in the last 72 hours.    Blood Sugar Average: Last 72 hrs:    - Patient with chronic history of type 2 diabetes mellitus in setting of stage III CKD.  - Continue subcutaneous insulin regimen.  - Goal blood glucose during hospital encounter of 1 40-1 80.  - Resume home medication regimen on discharge as appropriate.  - Outpatient follow-up routine.    Primary hypertension  Assessment & Plan  - Patient with chronic history of hypertension.  - Continue current medication regimen.  - Resume home medication therapy and discharge as appropriate.  - Outpatient follow-up routine.    Atrial fibrillation (HCC)  Assessment & Plan  - Patient with chronic history of atrial fibrillation.  - Continue current medication regimen.  - Continue to monitor heart rate.  - Outpatient follow-up per routine.      * Multiple rib fractures  Assessment & Plan  - Multiple acute minimally displaced right-sided rib fractures (7th-11th), present on admission.  - Continue rib fracture protocol.  - Continue to encourage incentive spirometer use and adequate pulmonary hygiene.  Currently pulling 300 mL on I.S.  - PIC score is 7.  - Appreciate APS evaluation and recommendations.  - Continue multimodal " analgesic regimen.  - PT and OT evaluation and treatment as indicated.  - Outpatient follow-up in the trauma clinic for re-evaluation in approximately 2 weeks.             Bowel Regimen: Senokot S  VTE Prophylaxis:Anticoagulated with Xarelto    Disposition: Continue current level of care, pending rehab placement. CM consulted for disposition planning.     Subjective   Chief Complaint: No complaints    Subjective: Patient states she is doing well and offers no complaints. States pain is well controlled at this time. Tolerating a diet and denies abdominal pain, nausea, vomiting. Last bowel movement yesterday. Pulling 300 cc on IS.      Objective   Vitals:   Temp:  [95.9 °F (35.5 °C)-97.8 °F (36.6 °C)] 97.8 °F (36.6 °C)  HR:  [] 82  Resp:  [15-18] 18  BP: (110-180)/() 136/89    I/O         01/28 0701  01/29 0700 01/29 0701  01/30 0700 01/30 0701  01/31 0700    P.O. 660 480     Total Intake(mL/kg) 660 (14.8) 480 (10.8)     Urine (mL/kg/hr) 1375 (1.3) 400 (0.4)     Stool 0 0     Total Output 1375 400     Net -715 +80            Unmeasured Urine Occurrence 4 x 2 x     Unmeasured Stool Occurrence 0 x 1 x              Physical Exam:   GENERAL APPEARANCE: Patient in no acute distress.  HEENT: NCAT; PERRL, EOMs intact; Mucous membranes moist  NECK / BACK: ROM normal  CV: Regular rate and rhythm; no murmur/gallops/rubs appreciated.  CHEST / LUNGS: Clear to auscultation; no wheezes/rales/rhonci.  ABD: NABS; soft; non-distended; non-tender.  : voiding  EXT: +2 pulses bilaterally upper & lower extremities; no edema.  NEURO: GCS 15; no focal neurologic deficits; neurovascularly intact.  SKIN: Warm, dry and well perfused; no rash; no jaundice.      Invasive Devices       Peripheral Intravenous Line  Duration             Peripheral IV 01/29/24 Right Antecubital 1 day                     PIC Score  PIC Pain Score: 3 (1/30/2024 12:09 AM)  PIC Incentive Spirometry Score: 2 (1/29/2024  7:36 PM)  PIC Cough Description: 2  (1/29/2024  7:36 PM)  PIC Total Score: 7 (1/29/2024  7:36 PM)       If the Total PIC Score </=5, did you consult APS and evaluate patient for further intervention?: no      Pain:    Incentive Spirometry  Cough  3 = Controlled  4 = Above goal volume 3 = Strong  2 = Moderate  3 = Goal to alert volume 2 = Weak  1 = Severe  2 = Below alert volume 1 = Absent     1 = Unable to perform IS         Lab Results: Results: I have personally reviewed all pertinent laboratory/tests results, BMP/CMP:   Lab Results   Component Value Date    SODIUM 132 (L) 01/30/2024    K 4.2 01/30/2024    CL 95 (L) 01/30/2024    CO2 25 01/30/2024    BUN 32 (H) 01/30/2024    CREATININE 1.08 01/30/2024    CALCIUM 9.2 01/30/2024    EGFR 47 01/30/2024   , and CBC:   Lab Results   Component Value Date    WBC 7.94 01/30/2024    HGB 13.7 01/30/2024    HCT 41.7 01/30/2024    MCV 95 01/30/2024     01/30/2024    RBC 4.41 01/30/2024    MCH 31.1 01/30/2024    MCHC 32.9 01/30/2024    RDW 12.8 01/30/2024    MPV 10.4 01/30/2024    NRBC 0 01/30/2024     Imaging: I have personally reviewed pertinent reports.     Other Studies: None

## 2024-01-30 NOTE — PROGRESS NOTES
Progress Note - Geriatric Medicine   Moriah Arreola 83 y.o. female MRN: [id number]  Unit/Bed#: [facility] 610-01 Encounter: [id number]      Assessment/Plan:    Ambulatory dysfunction with fall  -reportedly mechanical fall at home 1/25/24  -In setting of chronic systemic anticoagulation with Xarelto (A-fib)   -injuries as outlined below  -remains high risk future falls - cont fall precautions, would benefit from rehab on d/c for strengthening and conditioning oleg as resides home  alone   -encourage good body mechanics and assist with all transfers  -strongly encourage home fall risk assessment and personal fall alert system on returning home   -PT and OT following     Multiple right-sided rib fractures (7-11)  -s/p fall as outlined above   -CT chest abdomen pelvis on admission reports acute minimally displaced fractures of right-sided ribs 7 through 11 with small right pleural effusion  -No pneumothorax reported  -Saturating well on room air and pain appears well controlled   -continue aggressive pulm toilet   -pain has been very well controlled, has not required any opiates, no need to be prescribed on d/c      Acute pain due to trauma  -cont acute multimodal pain control  -has not required any opiates at all this admit, do not anticipate need on d/c  -recommend  bowel regimen to prevent and treat constipation due to increased risk with acute pain and pain medications     Hyponatremia  -[Na] 130 on admission  -Now improved, monitor electrolytes closely and avoid rapid and drastic fluctuations of no more than 8-12 pts/24H     Hypertensive urgency  -/96 on initial presentation now markedly improved, continue optimization of hemodynamics   -Likely multifactorial including acute pain and stressors related to injuries/hospitalization  -Now markedly improved, continue optimization of hemodynamics     A-fib with RVR  -Home regimen includes rate control with digoxin as well as both metoprolol tartrate and metoprolol  succinate  -Chronic systemic anticoagulant with Xarelto, denies history of easy bruising or uncontrolled bleeding  -Continue close outpatient follow with PCP and cardiology for ongoing titration and medication management     Chronic combined systolic and diastolic CHF  -EF 25-to 30% by echo 8/2022  -Maintained on Lasix and spironolactone every other day chronically as outpatient  -Monitor electrolytes,weights and volume status closely  -Continue healthy lifestyle choices and secondary risk factor modifications  -Continue close outpatient follow-up with Cardiology     Cognitive screening  -Alert and oriented, endorses mild age-related forgetfulness which she does not feel impacts her daily life   -Resides home alone and independent with ADLs and IADLs  -No prior cognitive testing on record for review  -CTH obtained on admission images personally viewed, reveals moderate diffuse chronic microangiopathic changes as well as large area encephalomalacia right temporal/temporoparietal area   -TSH WNL at 2.05, no recent B12 on record review, given concern for age-related forgetfulness recommend checking with next routine labs  -Patient encouraged to stay physically, socially, and cognitively active and engaged to maintain cognitive acuity  -Continue use of sensory assistive devices such as corrective lenses appropriate times reduce risk of uncorrected sensory impairment contributing to isolation, confusion, encephalopathy and more precipitous cognitive decline     Impaired Vision  -recommend use of corrective lenses at all appropriate times  -Consider large font for printed materials provided to patient     Frailty syndrome in geriatric patient   -Clinical frailty scale stage IV, vulnerable  -Multifactoral including age, A-fib with RVR, chronic combined systolic and diastolic CHF, CKD and multitude of additional chronic medical comorbidities now with fall and acute traumatic injury superimposed in elderly individual with  "limited physiologic and metabolic reserves increasing sensitivity to even seemingly mild additional metabolic derangements/stressors  -Continue optimization of chronic medical conditions and address acute metabolic derangements as arise  -Monitor for and treat any underlying anxiety/mood/depression symptoms as may impact response to therapies as well as overall sense of wellbeing and quality of life  -Continue psychosocial supports  -Ensure that treatments and interventions continue to align with patient's wishes and goals of care     High risk developing delirium   -continue strict delirium precautions  -encourage normal sleep/wake cycle  -monitor for fecal and urinary retention which may precipitate delirium  -provide frequent reorientation and redirection as indicated and appropriate  -redirect unwanted behaviors as first line    Care coordination: rounded with Will (CM)    Subjective:     Moriah is seen and examined at bedside where she sitting resting comfortably having just finished eating lunch, she repots that her rib pain is markedly improved and she is feeling much better overall. She reports that she is leaving for rehab possibly later today.     Review of Systems   Constitutional: Negative.  Negative for chills and fever.   HENT: Negative.     Eyes: Negative.    Respiratory:  Negative for shortness of breath.    Cardiovascular: Negative.    Gastrointestinal: Negative.    Genitourinary: Negative.    Musculoskeletal: Negative.         Rib cage pain well controlled    Skin: Negative.    Neurological: Negative.    Hematological: Negative.    Psychiatric/Behavioral: Negative.  Negative for sleep disturbance.    All other systems reviewed and are negative.    Objective:     Vitals: Blood pressure 136/89, pulse 82, temperature 97.8 °F (36.6 °C), temperature source Oral, resp. rate 18, height 4' 7\" (1.397 m), weight 44.6 kg (98 lb 6.4 oz), SpO2 99%.,Body mass index is 22.87 kg/m².      Intake/Output Summary " (Last 24 hours) at 1/30/2024 1309  Last data filed at 1/30/2024 1108  Gross per 24 hour   Intake 480 ml   Output --   Net 480 ml     Current Medications: Reviewed    Physical Exam:   Physical Exam  Vitals and nursing note reviewed.   Constitutional:       General: She is not in acute distress.     Appearance: She is not toxic-appearing.   HENT:      Head: Normocephalic.      Mouth/Throat:      Mouth: Mucous membranes are dry.   Eyes:      General: No scleral icterus.        Right eye: No discharge.         Left eye: No discharge.      Conjunctiva/sclera: Conjunctivae normal.      Comments: Wearing glasses   Neck:      Comments: Trachea midline, phonation normal  Cardiovascular:      Rate and Rhythm: Normal rate.      Pulses: Normal pulses.      Heart sounds: Murmur heard.   Pulmonary:      Effort: Pulmonary effort is normal. No respiratory distress.      Breath sounds: No wheezing.      Comments: Saturating well on room air  Abdominal:      General: Bowel sounds are normal. There is no distension.      Palpations: Abdomen is soft.      Tenderness: There is no abdominal tenderness.   Musculoskeletal:      Cervical back: Neck supple.      Right lower leg: No edema.      Left lower leg: No edema.      Comments: Reduced overall muscle mass   Skin:     General: Skin is warm and dry.   Neurological:      Mental Status: She is alert.      Comments: Awake and alert, answers questions appropriately   Psychiatric:         Mood and Affect: Mood normal.         Behavior: Behavior normal.      Comments: Polite pleasant cooperative and easily engaged        Invasive Devices       Peripheral Intravenous Line  Duration             Peripheral IV 01/29/24 Right Antecubital 1 day                  Lab Results:     I have personally reviewed pertinent lab results including the following:    Results from last 7 days   Lab Units 01/30/24  0533 01/27/24  0502 01/26/24  0602 01/25/24  0916   WBC Thousand/uL 7.94 6.04 6.10 7.03   HEMOGLOBIN  g/dL 13.7 13.3 13.2 13.3   HEMATOCRIT % 41.7 40.1 40.1 38.9   PLATELETS Thousands/uL 263 224 188 244   NEUTROS PCT % 67  --   --  74   MONOS PCT % 10  --   --  8   EOS PCT % 1  --   --  1     Results from last 7 days   Lab Units 01/30/24  0533 01/29/24  0446 01/27/24  0502 01/26/24  0602 01/25/24  1522 01/25/24  0920   POTASSIUM mmol/L 4.2 4.9 4.2 4.3 4.2 4.3   CHLORIDE mmol/L 95* 98 96 101 98 95*   CO2 mmol/L 25 25 28 25 27 28   BUN mg/dL 32* 32* 29* 24 17 19   CREATININE mg/dL 1.08 1.15 1.24 1.19 0.93 0.96   CALCIUM mg/dL 9.2 9.3 9.3 8.8 9.1 9.4   ALK PHOS U/L  --   --   --  56 65 65   ALT U/L  --   --   --  6* 8 10   AST U/L  --   --   --  21 19 18     I have personally reviewed the following imaging study reports in PACS:    No new imaging overnight

## 2024-01-30 NOTE — CASE MANAGEMENT
Case Management Discharge Planning Note    Patient name Moriah Arreola  Location [facility] 610/[facility] 610-01 MRN [id number]  : 1940 Date 2024       Current Admission Date: 2024  Current Admission Diagnosis:Multiple rib fractures   Patient Active Problem List    Diagnosis Date Noted    Fall 2024    Multiple rib fractures 2024    Type 2 diabetes mellitus with stage 3b chronic kidney disease, without long-term current use of insulin ([facility]) 2023    Type 2 diabetes mellitus with diabetic nephropathy, without long-term current use of insulin ([facility]) 2023    Stage 3b chronic kidney disease ([facility]) 2023    Closed wedge compression fracture of thoracic vertebra ([facility]) 2023    Primary hypertension 2022    Mild protein-calorie malnutrition ([facility]) 2022    PAD (peripheral artery disease) ([facility]) 2022    Acute combined systolic and diastolic congestive heart failure ([facility]) 2022    Atrial fibrillation ([facility]) 2022    JUANITA (acute kidney injury) ([facility]) 2022    Hyperlipemia 2022      LOS (days): 5  Geometric Mean LOS (GMLOS) (days): 3.1  Days to GMLOS:-1.8     OBJECTIVE:  Risk of Unplanned Readmission Score: 16.26         Current admission status: Inpatient   Preferred Pharmacy:   [id number] [place] [address] [address]  Phone: [phone] Fax: [phone]    Primary Care Provider: Jose R Yee MD    Primary Insurance: [employer]  REP  Secondary Insurance:     DISCHARGE DETAILS:      Auth obtained. Awaiting decision on if bed is available today at [facility]

## 2024-01-30 NOTE — CASE MANAGEMENT
Case Management Discharge Planning Note    Patient name Moriah Arreola  Location [facility] 610/[facility] 610-01 MRN [id number]  : 1940 Date 2024       Current Admission Date: 2024  Current Admission Diagnosis:Multiple rib fractures   Patient Active Problem List    Diagnosis Date Noted    Fall 2024    Multiple rib fractures 2024    Type 2 diabetes mellitus with stage 3b chronic kidney disease, without long-term current use of insulin ([facility]) 2023    Type 2 diabetes mellitus with diabetic nephropathy, without long-term current use of insulin ([facility]) 2023    Stage 3b chronic kidney disease ([facility]) 2023    Closed wedge compression fracture of thoracic vertebra ([facility]) 2023    Primary hypertension 2022    Mild protein-calorie malnutrition ([facility]) 2022    PAD (peripheral artery disease) ([facility]) 2022    Acute combined systolic and diastolic congestive heart failure ([facility]) 2022    Atrial fibrillation ([facility]) 2022    JUANITA (acute kidney injury) ([facility]) 2022    Hyperlipemia 2022      LOS (days): 5  Geometric Mean LOS (GMLOS) (days): 3.1  Days to GMLOS:-1.7     OBJECTIVE:  Risk of Unplanned Readmission Score: 16.26         Current admission status: Inpatient   Preferred Pharmacy:   [id number] - [place] [address] [address]  Phone: [phone] Fax: [phone]    Primary Care Provider: Jose R Yee MD    Primary Insurance: [employer] REP  Secondary Insurance:     DISCHARGE DETAILS:                                                                                                               Facility Insurance Auth Number: [id number]

## 2024-01-30 NOTE — QUICK NOTE
Report called to the [place] [facility] [place]. Admissions nurse took report and all questions answered. Call back number provided to her.   IV removed and charted.   D/c paperwork faxxed and hard copy given to transport.    Patient discharged via wheelchair with transport EMT.

## 2024-01-30 NOTE — DISCHARGE INSTR - AVS FIRST PAGE
Rib Fracture Discharge Instructions  Seek medical attention if you develop worsening chest pain or shortness of breath, dizziness/lightheadness, fevers/chills or sweats.   No heavy lifting, pushing or pulling >10 pounds and no strenuous physical activity until cleared by trauma.   No work or driving while taking narcotic pain medications and until cleared by trauma.   Use your incentive spirometer at least hourly while awake.

## 2024-01-30 NOTE — CASE MANAGEMENT
Case Management Discharge Planning Note    Patient name Moriah Arreola  Location [facility] 610/[facility] 610-01 MRN [id number]  : 1940 Date 2024       Current Admission Date: 2024  Current Admission Diagnosis:Multiple rib fractures   Patient Active Problem List    Diagnosis Date Noted    Fall 2024    Multiple rib fractures 2024    Type 2 diabetes mellitus with stage 3b chronic kidney disease, without long-term current use of insulin ([facility]) 2023    Type 2 diabetes mellitus with diabetic nephropathy, without long-term current use of insulin ([facility]) 2023    Stage 3b chronic kidney disease ([facility]) 2023    Closed wedge compression fracture of thoracic vertebra ([facility]) 2023    Primary hypertension 2022    Mild protein-calorie malnutrition ([facility]) 2022    PAD (peripheral artery disease) ([facility]) 2022    Acute combined systolic and diastolic congestive heart failure ([facility]) 2022    Atrial fibrillation ([facility]) 2022    JUANITA (acute kidney injury) ([facility]) 2022    Hyperlipemia 2022      LOS (days): 5  Geometric Mean LOS (GMLOS) (days): 3.1  Days to GMLOS:-1.8     OBJECTIVE:  Risk of Unplanned Readmission Score: 16.3         Current admission status: Inpatient   Preferred Pharmacy:   [id number] [place] [address] [address]  Phone: [phone] Fax: [phone]    Primary Care Provider: Jose R Yee MD    Primary Insurance: [employer]  REP  Secondary Insurance:     DISCHARGE DETAILS:    Pt will d/c to [facility] today, after 1500. Cm submitted for wheelchair van transport

## 2024-01-30 NOTE — DISCHARGE SUMMARY
"[facility]  Discharge- Moriah Arreola 1940, 83 y.o. female MRN: [id number]  Unit/Bed#: [facility] 610-01 Encounter: [id number]  Primary Care Provider: Jose R Yee MD   Date and time admitted to hospital: 1/25/2024 12:35 PM    Fall  Assessment & Plan  - Status post fall with the below noted injuries.  - Fall precautions.  - Geriatric Medicine consultation for evaluation, medication review and recommendations.  - PT and OT evaluation and treatment as indicated.  - Case Management consultation for disposition planning. D/C to SNF today.     Type 2 diabetes mellitus with stage 3b chronic kidney disease, without long-term current use of insulin (HCC)  Assessment & Plan  Lab Results   Component Value Date    HGBA1C 6.0 (H) 04/27/2016       No results for input(s): \"POCGLU\" in the last 72 hours.    Blood Sugar Average: Last 72 hrs:    - Patient with chronic history of type 2 diabetes mellitus in setting of stage III CKD.  - Continue subcutaneous insulin regimen.  - Goal blood glucose during hospital encounter of 1 40-1 80.  - Resume home medication regimen on discharge as appropriate.  - Outpatient follow-up routine.    Primary hypertension  Assessment & Plan  - Patient with chronic history of hypertension.  - Continue current medication regimen.  - Resume home medication therapy and discharge as appropriate.  - Outpatient follow-up routine.    Atrial fibrillation (HCC)  Assessment & Plan  - Patient with chronic history of atrial fibrillation.  - Continue current medication regimen.  - Outpatient follow-up per routine.      * Multiple rib fractures  Assessment & Plan  - Multiple acute minimally displaced right-sided rib fractures (7th-11th), present on admission.  - Continue rib fracture protocol.  - Continue to encourage incentive spirometer use and adequate pulmonary hygiene.  Currently pulling 300 mL on I.S.  - PIC score is 7.  - Appreciate APS evaluation and recommendations.  - " "Continue multimodal analgesic regimen.  - PT and OT evaluation and treatment as indicated.  - Outpatient follow-up in the trauma clinic for re-evaluation in approximately 2 weeks.              Medical Problems       Resolved Problems  Date Reviewed: 1/30/2024   None         Admission Date:   Admission Orders (From admission, onward)       Ordered        01/25/24 1520  Inpatient Admission  Once                            Admitting Diagnosis: Closed fracture of multiple ribs of right side, initial encounter [S22.41XA]    HPI: As documented by Dr. Agarwal who evaluated the patient on admission, Amaris Arreola is a 83 y.o. female who presents with with a past medical history of A-fib on digoxin, metoprolol, and Xarelto, CHF on Lasix, spironolactone, and losartan, CKD, DM 2, and HTN who presents as a transfer from an outside hospital after a fall being found to have right 7-11 minimally displaced rib fractures.  She reports that on 1/24 while in the bathroom she turned around too quickly and lost her balance falling on her right side directly into the tub.  Denies head strike and loss of consciousness.  She was able to get up but had persistent pain on her right side which brought her to the ED.  She denies fevers, chills, nausea, vomiting, and dysuria. \"    Procedures Performed: No orders of the defined types were placed in this encounter.      Summary of Hospital Course: Patient was placed on the trauma service following fall when she was found to have R 7-11th rib fractures. She was placed on rib fracture protocol and did well. She did not require supplemental oxygen and her pain was controlled on a multi modal regimen. She as resumed on her Xarelto and other home medications. She worked with PT/OT who recommended inpatient rehab. She will discharge to inpatient rehab today. She will f/u with trauma in 2 weeks for f/u regarding her rib fractures.     Significant Findings, Care, Treatment and Services Provided:    XR " chest portable ICU    Result Date: 1/26/2024  Impression: Small right pleural effusion with adjacent passive atelectasis. No pneumothorax. Workstation performed: [id number]     CT head without contrast    Result Date: 1/25/2024  Impression: No acute intracranial abnormality. Workstation performed: [id number]     CT chest abdomen pelvis w contrast    Result Date: 1/25/2024  Impression: 1.  Acute minimally displaced fractures of right-sided ribs 7 through 11. 2.  Small right pleural effusion. 3.  Incidental thyroid nodule(s) for which nonemergent thyroid ultrasound is recommended. The study was marked in EPIC for immediate notification. Workstation performed: [id number]          Complications: no new    Condition at Discharge: good         Discharge instructions/Information to patient and family:   See after visit summary for information provided to patient and family.      Provisions for Follow-Up Care:  See after visit summary for information related to follow-up care and any pertinent home health orders.      PCP: Jose R Yee MD    Disposition: [facility]    Planned Readmission: No    Discharge Statement   I spent 25 minutes discharging the patient. This time was spent on the day of discharge. I had direct contact with the patient on the day of discharge. Additional documentation is required if more than 30 minutes were spent on discharge.     Discharge Medications:  See after visit summary for reconciled discharge medications provided to patient and family.

## 2024-01-30 NOTE — CASE MANAGEMENT
Case Management Discharge Planning Note    Patient name Moriah Arreola  Location [facility] 610/[facility] 610-01 MRN [id number]  : 1940 Date 2024       Current Admission Date: 2024  Current Admission Diagnosis:Multiple rib fractures   Patient Active Problem List    Diagnosis Date Noted    Fall 2024    Multiple rib fractures 2024    Type 2 diabetes mellitus with stage 3b chronic kidney disease, without long-term current use of insulin ([facility]) 2023    Type 2 diabetes mellitus with diabetic nephropathy, without long-term current use of insulin ([facility]) 2023    Stage 3b chronic kidney disease ([facility]) 2023    Closed wedge compression fracture of thoracic vertebra ([facility]) 2023    Primary hypertension 2022    Mild protein-calorie malnutrition ([facility]) 2022    PAD (peripheral artery disease) ([facility]) 2022    Acute combined systolic and diastolic congestive heart failure ([facility]) 2022    Atrial fibrillation ([facility]) 2022    JUANITA (acute kidney injury) ([facility]) 2022    Hyperlipemia 2022      LOS (days): 5  Geometric Mean LOS (GMLOS) (days): 3.1  Days to GMLOS:-1.7     OBJECTIVE:  Risk of Unplanned Readmission Score: 16.26         Current admission status: Inpatient   Preferred Pharmacy:   [id number] [place] [address] [address]  Phone: [phone] Fax: [phone]    Primary Care Provider: Jose R Yee MD    Primary Insurance: [employer] [facility] REP  Secondary Insurance:     DISCHARGE DETAILS:    Auth sent for [facility]

## 2024-01-30 NOTE — CASE MANAGEMENT
[facility] has received approved authorization from insurance: [employer]      Called in by Rep: María SEGAL# [phone]   Authorization received for: SNF  Facility: [facility]   Authorization #: [id number]  Start of Care: 01/30  Next Review Date: 2 Business Days   Submit next review to F#: [phone]   Care Manager notified: Spencer Parra

## 2024-01-30 NOTE — ASSESSMENT & PLAN NOTE
- Patient with chronic history of atrial fibrillation.  - Continue current medication regimen.  - Outpatient follow-up per routine.

## 2024-01-30 NOTE — ASSESSMENT & PLAN NOTE
- Multiple acute minimally displaced right-sided rib fractures (7th-11th), present on admission.  - Continue rib fracture protocol.  - Continue to encourage incentive spirometer use and adequate pulmonary hygiene.  Currently pulling 300 mL on I.S.  - PIC score is 7.  - Appreciate APS evaluation and recommendations.  - Continue multimodal analgesic regimen.  - PT and OT evaluation and treatment as indicated.  - Outpatient follow-up in the trauma clinic for re-evaluation in approximately 2 weeks.

## 2024-01-30 NOTE — ASSESSMENT & PLAN NOTE
- Status post fall with the below noted injuries.  - Fall precautions.  - Geriatric Medicine consultation for evaluation, medication review and recommendations.  - PT and OT evaluation and treatment as indicated.  - Case Management consultation for disposition planning. D/C to SNF today.

## 2024-01-30 NOTE — CASE MANAGEMENT
[facility] received request for authorization from Care Manager.  Authorization request for: SNF  Facility Name: [facility] NPI: [id number]  Facility MD: Allan Valero NPI: [id number]  Authorization initiated by contacting insurance: [employer]     Via: Fax  Clinicals submitted via: fax # [phone]

## 2024-01-30 NOTE — QUICK NOTE
This nurse called to give report to facility ([facility]). This nurse was transferred but no answer. Message left with call back number to admissions. Will try again.   Anticipated  time is 1500.     1402: This nurse called facility the second time to give report, was transferred and sent to voicemail of the , Keegan Elkins.

## 2024-01-31 NOTE — UTILIZATION REVIEW
NOTIFICATION OF ADMISSION DISCHARGE   This is a Notification of Discharge from [facility]. Please be advised that this patient has been discharge from our facility. Below you will find the admission and discharge date and time including the patient’s disposition.   UTILIZATION REVIEW CONTACT:  Gabriele Drake  Utilization   Network Utilization Review Department  Phone: [phone] x carefully listen to the prompts. All voicemails are confidential.  Email: [email or web address]     ADMISSION INFORMATION  PRESENTATION DATE: 1/25/2024 12:35 PM  OBERVATION ADMISSION DATE:   INPATIENT ADMISSION DATE: 1/25/24  3:20 PM   DISCHARGE DATE: 1/30/2024  3:02 PM   [facility]    Network Utilization Review Department  ATTENTION: Please call with any questions or concerns to [phone] and carefully listen to the prompts so that you are directed to the right person. All voicemails are confidential.   For Discharge needs, contact Care Management DC Support Team at [phone] opt. 2  Send all requests for admission clinical reviews, approved or denied determinations and any other requests to dedicated fax number below belonging to the campus where the patient is receiving treatment. List of dedicated fax numbers for the Facilities:  FACILITY NAME UR FAX NUMBER   ADMISSION DENIALS (Administrative/Medical Necessity) [phone]   DISCHARGE SUPPORT TEAM ([facility]) [phone]   PARENT CHILD HEALTH (Maternity/NICU/Pediatrics) [phone]   [facility] [phone]   [facility] [phone]   [facility] [phone]   [facility] [phone]   [facility] [phone]   [facility] [phone]   [facility] [phone]   [facility] [phone]    [facility] [phone]   [facility] [phone]   [facility] [phone]   [facility] [phone]

## 2024-02-01 ENCOUNTER — PATIENT OUTREACH (OUTPATIENT)
Dept: CASE MANAGEMENT | Facility: OTHER | Age: 84
End: 2024-02-01

## 2024-02-01 NOTE — PROGRESS NOTES
Outpatient care management referral via HRR report 2/1/24. Discharged to [facility] [place] 1/30/24. Email sent to facility to inform them I will be following the patient during their skilled stay.  This Admin Coordinator will continue to monitor via chart review.

## 2024-02-08 ENCOUNTER — PATIENT OUTREACH (OUTPATIENT)
Dept: CASE MANAGEMENT | Facility: OTHER | Age: 84
End: 2024-02-08

## 2024-02-08 NOTE — PROGRESS NOTES
Chart review completed.  Email sent to facility requesting update on patient.   This care manager assistant will continue to monitor via chart review throughout SNF/STR Surveillance episode.

## 2024-02-12 DIAGNOSIS — I48.91 ATRIAL FIBRILLATION WITH RVR (HCC): ICD-10-CM

## 2024-02-12 RX ORDER — DIGOXIN 125 MCG
125 TABLET ORAL
Qty: 48 TABLET | Refills: 0 | Status: SHIPPED | OUTPATIENT
Start: 2024-02-13 | End: 2024-02-15 | Stop reason: SDUPTHER

## 2024-02-15 DIAGNOSIS — I48.91 ATRIAL FIBRILLATION WITH RVR (HCC): ICD-10-CM

## 2024-02-15 RX ORDER — DIGOXIN 125 MCG
125 TABLET ORAL
Qty: 48 TABLET | Refills: 0 | Status: SHIPPED | OUTPATIENT
Start: 2024-02-15

## 2024-02-15 NOTE — TELEPHONE ENCOUNTER
Patient needs refill elizabeth appt from 02-13 to March 20 for dr hilliard  for digoxin to [facility]

## 2024-02-16 ENCOUNTER — PATIENT OUTREACH (OUTPATIENT)
Dept: CASE MANAGEMENT | Facility: OTHER | Age: 84
End: 2024-02-16

## 2024-02-22 ENCOUNTER — PATIENT OUTREACH (OUTPATIENT)
Dept: CASE MANAGEMENT | Facility: OTHER | Age: 84
End: 2024-02-22

## 2024-02-22 ENCOUNTER — RA CDI HCC (OUTPATIENT)
Dept: OTHER | Facility: HOSPITAL | Age: 84
End: 2024-02-22

## 2024-02-28 ENCOUNTER — PATIENT OUTREACH (OUTPATIENT)
Dept: CASE MANAGEMENT | Facility: OTHER | Age: 84
End: 2024-02-28

## 2024-02-28 DIAGNOSIS — N18.32 TYPE 2 DIABETES MELLITUS WITH STAGE 3B CHRONIC KIDNEY DISEASE, WITHOUT LONG-TERM CURRENT USE OF INSULIN (HCC): Primary | ICD-10-CM

## 2024-02-28 DIAGNOSIS — E11.22 TYPE 2 DIABETES MELLITUS WITH STAGE 3B CHRONIC KIDNEY DISEASE, WITHOUT LONG-TERM CURRENT USE OF INSULIN (HCC): Primary | ICD-10-CM

## 2024-02-28 NOTE — PROGRESS NOTES
Call placed to the [facility] [place] who confirmed the patient discharged 2/9/24 to Home. I have removed myself off of the care team, added the CM to the care team who will follow the patient through the episode, sent the care manager an inbasket notifying them of the HRR Referal.  Ambulatory referral placed for complex care management.  A email was sent to the facility requesting discharge instructions. When Admin Coordinator has received the Discharge paperwork  Admin Coordinator will attach to this encounter.

## 2024-02-29 ENCOUNTER — PATIENT OUTREACH (OUTPATIENT)
Dept: FAMILY MEDICINE CLINIC | Facility: CLINIC | Age: 84
End: 2024-02-29

## 2024-02-29 NOTE — PROGRESS NOTES
Received referrral via in basket message. Chart reviewed. Pt had a fall in home, fractured ribs on 1/25/24.  Was discharged to short term rehab.  Discharged to home as per chart 2/9/24.  Call to patient, no answer, no machine.   Will retry next week. Pt scheduled to see PCP 3/6/24.

## 2024-03-01 ENCOUNTER — RA CDI HCC (OUTPATIENT)
Dept: OTHER | Facility: HOSPITAL | Age: 84
End: 2024-03-01

## 2024-03-01 PROBLEM — I13.0 HYPERTENSIVE HEART DISEASE WITH CONGESTIVE HEART FAILURE AND CHRONIC KIDNEY DISEASE (HCC): Status: ACTIVE | Noted: 2024-03-01

## 2024-03-04 DIAGNOSIS — I48.20 CHRONIC ATRIAL FIBRILLATION (HCC): ICD-10-CM

## 2024-03-04 RX ORDER — METOPROLOL SUCCINATE 25 MG/1
25 TABLET, EXTENDED RELEASE ORAL 3 TIMES DAILY
Qty: 270 TABLET | Refills: 1 | Status: SHIPPED | OUTPATIENT
Start: 2024-03-04

## 2024-03-06 ENCOUNTER — OFFICE VISIT (OUTPATIENT)
Dept: FAMILY MEDICINE CLINIC | Facility: CLINIC | Age: 84
End: 2024-03-06
Payer: COMMERCIAL

## 2024-03-06 ENCOUNTER — PATIENT OUTREACH (OUTPATIENT)
Dept: FAMILY MEDICINE CLINIC | Facility: CLINIC | Age: 84
End: 2024-03-06

## 2024-03-06 VITALS
DIASTOLIC BLOOD PRESSURE: 92 MMHG | BODY MASS INDEX: 22.91 KG/M2 | SYSTOLIC BLOOD PRESSURE: 134 MMHG | OXYGEN SATURATION: 97 % | WEIGHT: 99 LBS | TEMPERATURE: 98.1 F | HEART RATE: 97 BPM | HEIGHT: 55 IN

## 2024-03-06 DIAGNOSIS — E44.1 MILD PROTEIN-CALORIE MALNUTRITION (HCC): ICD-10-CM

## 2024-03-06 DIAGNOSIS — I50.41 ACUTE COMBINED SYSTOLIC AND DIASTOLIC CONGESTIVE HEART FAILURE (HCC): ICD-10-CM

## 2024-03-06 DIAGNOSIS — I50.43 ACUTE ON CHRONIC COMBINED SYSTOLIC (CONGESTIVE) AND DIASTOLIC (CONGESTIVE) HEART FAILURE (HCC): ICD-10-CM

## 2024-03-06 DIAGNOSIS — Z00.00 WELL ADULT EXAM: Primary | ICD-10-CM

## 2024-03-06 DIAGNOSIS — N18.32 STAGE 3B CHRONIC KIDNEY DISEASE (HCC): ICD-10-CM

## 2024-03-06 DIAGNOSIS — I73.9 PAD (PERIPHERAL ARTERY DISEASE) (HCC): ICD-10-CM

## 2024-03-06 DIAGNOSIS — E78.5 HYPERLIPIDEMIA, UNSPECIFIED HYPERLIPIDEMIA TYPE: ICD-10-CM

## 2024-03-06 DIAGNOSIS — I48.91 ATRIAL FIBRILLATION, UNSPECIFIED TYPE (HCC): ICD-10-CM

## 2024-03-06 DIAGNOSIS — I10 PRIMARY HYPERTENSION: ICD-10-CM

## 2024-03-06 PROBLEM — E11.21 TYPE 2 DIABETES MELLITUS WITH DIABETIC NEPHROPATHY, WITHOUT LONG-TERM CURRENT USE OF INSULIN (HCC): Status: RESOLVED | Noted: 2023-12-06 | Resolved: 2024-03-06

## 2024-03-06 PROBLEM — E11.22 TYPE 2 DIABETES MELLITUS WITH STAGE 3B CHRONIC KIDNEY DISEASE, WITHOUT LONG-TERM CURRENT USE OF INSULIN (HCC): Status: RESOLVED | Noted: 2023-12-06 | Resolved: 2024-03-06

## 2024-03-06 PROCEDURE — 99214 OFFICE O/P EST MOD 30 MIN: CPT | Performed by: FAMILY MEDICINE

## 2024-03-06 PROCEDURE — G0439 PPPS, SUBSEQ VISIT: HCPCS | Performed by: FAMILY MEDICINE

## 2024-03-06 RX ORDER — LOSARTAN POTASSIUM 100 MG/1
100 TABLET ORAL DAILY
Qty: 90 TABLET | Refills: 1 | Status: SHIPPED | OUTPATIENT
Start: 2024-03-06

## 2024-03-06 NOTE — PATIENT INSTRUCTIONS
Medicare Preventive Visit Patient Instructions  Thank you for completing your Welcome to Medicare Visit or Medicare Annual Wellness Visit today. Your next wellness visit will be due in one year (3/7/2025).  The screening/preventive services that you may require over the next 5-10 years are detailed below. Some tests may not apply to you based off risk factors and/or age. Screening tests ordered at today's visit but not completed yet may show as past due. Also, please note that scanned in results may not display below.  Preventive Screenings:  Service Recommendations Previous Testing/Comments   Colorectal Cancer Screening  * Colonoscopy    * Fecal Occult Blood Test (FOBT)/Fecal Immunochemical Test (FIT)  * Fecal DNA/Cologuard Test  * Flexible Sigmoidoscopy Age: 45-75 years old   Colonoscopy: every 10 years (may be performed more frequently if at higher risk)  OR  FOBT/FIT: every 1 year  OR  Cologuard: every 3 years  OR  Sigmoidoscopy: every 5 years  Screening may be recommended earlier than age 45 if at higher risk for colorectal cancer. Also, an individualized decision between you and your healthcare provider will decide whether screening between the ages of 76-85 would be appropriate. Colonoscopy: Not on file  FOBT/FIT: Not on file  Cologuard: Not on file  Sigmoidoscopy: Not on file          Breast Cancer Screening Age: 40+ years old  Frequency: every 1-2 years  Not required if history of left and right mastectomy Mammogram: Not on file        Cervical Cancer Screening Between the ages of 21-29, pap smear recommended once every 3 years.   Between the ages of 30-65, can perform pap smear with HPV co-testing every 5 years.   Recommendations may differ for women with a history of total hysterectomy, cervical cancer, or abnormal pap smears in past. Pap Smear: Not on file    Screening Not Indicated   Hepatitis C Screening Once for adults born between 1945 and 1965  More frequently in patients at high risk for Hepatitis  C Hep C Antibody: Not on file        Diabetes Screening 1-2 times per year if you're at risk for diabetes or have pre-diabetes Fasting glucose: No results in last 5 years (No results in last 5 years)  A1C: No results in last 5 years (No results in last 5 years)  Screening Not Indicated  History Diabetes   Cholesterol Screening Once every 5 years if you don't have a lipid disorder. May order more often based on risk factors. Lipid panel: 01/10/2023    Screening Not Indicated  History Lipid Disorder     Other Preventive Screenings Covered by Medicare:  Abdominal Aortic Aneurysm (AAA) Screening: covered once if your at risk. You're considered to be at risk if you have a family history of AAA.  Lung Cancer Screening: covers low dose CT scan once per year if you meet all of the following conditions: (1) Age 55-77; (2) No signs or symptoms of lung cancer; (3) Current smoker or have quit smoking within the last 15 years; (4) You have a tobacco smoking history of at least 20 pack years (packs per day multiplied by number of years you smoked); (5) You get a written order from a healthcare provider.  Glaucoma Screening: covered annually if you're considered high risk: (1) You have diabetes OR (2) Family history of glaucoma OR (3)  aged 50 and older OR (4)  American aged 65 and older  Osteoporosis Screening: covered every 2 years if you meet one of the following conditions: (1) You're estrogen deficient and at risk for osteoporosis based off medical history and other findings; (2) Have a vertebral abnormality; (3) On glucocorticoid therapy for more than 3 months; (4) Have primary hyperparathyroidism; (5) On osteoporosis medications and need to assess response to drug therapy.   Last bone density test (DXA Scan): Not on file.  HIV Screening: covered annually if you're between the age of 15-65. Also covered annually if you are younger than 15 and older than 65 with risk factors for HIV infection. For  pregnant patients, it is covered up to 3 times per pregnancy.    Immunizations:  Immunization Recommendations   Influenza Vaccine Annual influenza vaccination during flu season is recommended for all persons aged >= 6 months who do not have contraindications   Pneumococcal Vaccine   * Pneumococcal conjugate vaccine = PCV13 (Prevnar 13), PCV15 (Vaxneuvance), PCV20 (Prevnar 20)  * Pneumococcal polysaccharide vaccine = PPSV23 (Pneumovax) Adults 19-65 yo with certain risk factors or if 65+ yo  If never received any pneumonia vaccine: recommend Prevnar 20 (PCV20)  Give PCV20 if previously received 1 dose of PCV13 or PPSV23   Hepatitis B Vaccine 3 dose series if at intermediate or high risk (ex: diabetes, end stage renal disease, liver disease)   Respiratory syncytial virus (RSV) Vaccine - COVERED BY MEDICARE PART D  * RSVPreF3 (Arexvy) CDC recommends that adults 60 years of age and older may receive a single dose of RSV vaccine using shared clinical decision-making (SCDM)   Tetanus (Td) Vaccine - COST NOT COVERED BY MEDICARE PART B Following completion of primary series, a booster dose should be given every 10 years to maintain immunity against tetanus. Td may also be given as tetanus wound prophylaxis.   Tdap Vaccine - COST NOT COVERED BY MEDICARE PART B Recommended at least once for all adults. For pregnant patients, recommended with each pregnancy.   Shingles Vaccine (Shingrix) - COST NOT COVERED BY MEDICARE PART B  2 shot series recommended in those 19 years and older who have or will have weakened immune systems or those 50 years and older     Health Maintenance Due:  There are no preventive care reminders to display for this patient.  Immunizations Due:      Topic Date Due   • COVID-19 Vaccine (5 - 2023-24 season) 09/01/2023     Advance Directives   What are advance directives?  Advance directives are legal documents that state your wishes and plans for medical care. These plans are made ahead of time in case you  lose your ability to make decisions for yourself. Advance directives can apply to any medical decision, such as the treatments you want, and if you want to donate organs.   What are the types of advance directives?  There are many types of advance directives, and each state has rules about how to use them. You may choose a combination of any of the following:  Living will:  This is a written record of the treatment you want. You can also choose which treatments you do not want, which to limit, and which to stop at a certain time. This includes surgery, medicine, IV fluid, and tube feedings.   Durable power of  for healthcare (DPAHC):  This is a written record that states who you want to make healthcare choices for you when you are unable to make them for yourself. This person, called a proxy, is usually a family member or a friend. You may choose more than 1 proxy.  Do not resuscitate (DNR) order:  A DNR order is used in case your heart stops beating or you stop breathing. It is a request not to have certain forms of treatment, such as CPR. A DNR order may be included in other types of advance directives.  Medical directive:  This covers the care that you want if you are in a coma, near death, or unable to make decisions for yourself. You can list the treatments you want for each condition. Treatment may include pain medicine, surgery, blood transfusions, dialysis, IV or tube feedings, and a ventilator (breathing machine).  Values history:  This document has questions about your views, beliefs, and how you feel and think about life. This information can help others choose the care that you would choose.  Why are advance directives important?  An advance directive helps you control your care. Although spoken wishes may be used, it is better to have your wishes written down. Spoken wishes can be misunderstood, or not followed. Treatments may be given even if you do not want them. An advance directive may make  it easier for your family to make difficult choices about your care.   Fall Prevention    Fall prevention  includes ways to make your home and other areas safer. It also includes ways you can move more carefully to prevent a fall. Health conditions that cause changes in your blood pressure, vision, or muscle strength and coordination may increase your risk for falls. Medicines may also increase your risk for falls if they make you dizzy, weak, or sleepy.   Fall prevention tips:   Stand or sit up slowly.    Use assistive devices as directed.    Wear shoes that fit well and have soles that .    Wear a personal alarm.    Stay active.    Manage your medical conditions.    Home Safety Tips:  Add items to prevent falls in the bathroom.    Keep paths clear.    Install bright lights in your home.    Keep items you use often on shelves within reach.    Paint or place reflective tape on the edges of your stairs.       © Copyright [employer] 2018 Information is for End User's use only and may not be sold, redistributed or otherwise used for commercial purposes. All illustrations and images included in CareNotes® are the copyrighted property of [employer] or [employer]

## 2024-03-06 NOTE — PROGRESS NOTES
Assessment and Plan:     Problem List Items Addressed This Visit     Acute on chronic combined systolic (congestive) and diastolic (congestive) heart failure (HCC)    Relevant Medications    losartan (COZAAR) 100 MG tablet    Atrial fibrillation (HCC)     Continue with anticogulation and rate control of heart rate. Concerns about condition were addressed today.          Hyperlipemia     Patient  is stable with current medication and we discussed a low fat low cholesterol diet. Weight loss also discussed for this will help lower cholesterol also. Recheck lipids in 6 months.          Primary hypertension     Patient is stable with current anti-hypertensive medicine and continue to follow a low sodium diet and take current medication. All questions about this condition were answered today.          Relevant Medications    losartan (COZAAR) 100 MG tablet    Mild protein-calorie malnutrition (HCC)    PAD (peripheral artery disease) (HCC)     Patient is stable  and will continue present plan of care and reassess at next routine visit. All questions about this problem from patient were answered today.          Stage 3b chronic kidney disease (HCC)     Lab Results   Component Value Date    EGFR 47 01/30/2024    EGFR 44 01/29/2024    EGFR 40 01/27/2024    CREATININE 1.08 01/30/2024    CREATININE 1.15 01/29/2024    CREATININE 1.24 01/27/2024   Pt to avoid NSAIDs and any IV dyes. Patient to follow up eoither with nephrology or  with us for  further  monitoring of  renal function.         Other Visit Diagnoses     Well adult exam    -  Primary           Preventive health issues were discussed with patient, and age appropriate screening tests were ordered as noted in patient's After Visit Summary.  Personalized health advice and appropriate referrals for health education or preventive services given if needed, as noted in patient's After Visit Summary.     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)   • Atrial fibrillation (HCC)   • JUANITA (acute kidney injury) (HCC)   • Hyperlipemia   • Primary hypertension   • Mild protein-calorie malnutrition (HCC)   • PAD (peripheral artery disease) (HCC)   • Closed wedge compression fracture of thoracic vertebra (HCC)   • Stage 3b chronic kidney disease (HCC)   • Multiple rib fractures   • Fall   • Hypertensive heart disease with congestive heart failure and chronic kidney disease (HCC)      Past Medical and Surgical History:     Past Medical History:   Diagnosis Date   • Atrial fibrillation (HCC)    • Hypercholesteremia      Past Surgical History:   Procedure Laterality Date   • EMBOLIZATION     • HERNIA REPAIR        Family History:     History reviewed. No pertinent family history.   Social History:     Social History     Socioeconomic History   • Marital status:      Spouse name: None   • Number of children: None   • Years of education: None   • Highest education level: None   Occupational History   • None   Tobacco Use   • Smoking status: Former     Current packs/day: 0.00     Types: Cigarettes     Quit date:      Years since quittin.1   • Smokeless tobacco: Never   Vaping Use   • Vaping status: Never Used   Substance and Sexual Activity   • Alcohol use: Yes     Alcohol/week: 7.0 standard drinks of alcohol     Types: 7 Standard drinks or equivalent per week     Comment: one glass wine once a week   • Drug use: Not Currently   • Sexual activity: Not Currently   Other Topics Concern   • None   Social History Narrative   • None     Social Determinants of Health     Financial Resource Strain: Low Risk   (3/6/2024)    Overall Financial Resource Strain (CARDIA)    • Difficulty of Paying Living Expenses: Not hard at all   Food Insecurity: No Food Insecurity (1/26/2024)    Hunger Vital Sign    • Worried About Running Out of Food in the Last Year: Never true    • Ran Out of Food in the Last Year: Never true   Transportation Needs: No Transportation Needs (3/6/2024)    PRAPARE - Transportation    • Lack of Transportation (Medical): No    • Lack of Transportation (Non-Medical): No   Physical Activity: Not on file   Stress: Not on file   Social Connections: Not on file   Intimate Partner Violence: Not on file   Housing Stability: Low Risk  (1/26/2024)    Housing Stability Vital Sign    • Unable to Pay for Housing in the Last Year: No    • Number of Places Lived in the Last Year: 1    • Unstable Housing in the Last Year: No      Medications and Allergies:     Current Outpatient Medications   Medication Sig Dispense Refill   • atorvastatin (LIPITOR) 10 mg tablet Take 1 tablet (10 mg total) by mouth daily 90 tablet 3   • digoxin (LANOXIN) 0.125 mg tablet Take 1 tablet (125 mcg total) by mouth 4 (four) times a week Advised patient to take 5 times weekly 48 tablet 0   • furosemide (LASIX) 20 mg tablet Take 1 tablet (20 mg total) by mouth every other day 15 tablet 5   • lidocaine (LIDODERM) 5 % Apply 1 patch topically over 12 hours daily Remove & Discard patch within 12 hours or as directed by MD     • losartan (COZAAR) 100 MG tablet Take 1 tablet (100 mg total) by mouth daily 90 tablet 1   • metoprolol succinate (TOPROL-XL) 25 mg 24 hr tablet Take 1 tablet (25 mg total) by mouth 3 (three) times a day 270 tablet 1   • metoprolol tartrate (LOPRESSOR) 100 mg tablet Take 1 tablet (100 mg total) by mouth every 12 (twelve) hours 180 tablet 3   • rivaroxaban (Xarelto) 15 mg tablet Take 1 tablet (15 mg total) by mouth every evening 90 tablet 0   • spironolactone (ALDACTONE) 25 mg tablet Take 0.5 tablets (12.5 mg total) by mouth every  other day 45 tablet 2   • acetaminophen (TYLENOL) 325 mg tablet Take 3 tablets (975 mg total) by mouth every 8 (eight) hours (Patient not taking: Reported on 3/6/2024)     • gabapentin (NEURONTIN) 100 mg capsule Take 1 capsule (100 mg total) by mouth daily at bedtime (Patient not taking: Reported on 3/6/2024) 45 capsule 0   • melatonin 3 mg Take 1 tablet (3 mg total) by mouth daily at bedtime (Patient not taking: Reported on 3/6/2024) 10 tablet 0   • OLANZapine (ZyPREXA ZYDIS) 5 mg dispersible tablet Take 1 tablet (5 mg total) by mouth daily at bedtime (Patient not taking: Reported on 3/6/2024) 10 tablet 0   • oxyCODONE (ROXICODONE) 5 immediate release tablet 2.5 mg to 5 mg PO every 4 hours as needed for moderate to severe pain. Ongoing therapy. (Patient not taking: Reported on 3/6/2024) 20 tablet 0   • senna-docusate sodium (SENOKOT S) 8.6-50 mg per tablet Take 1 tablet by mouth daily at bedtime (Patient not taking: Reported on 3/6/2024)       No current facility-administered medications for this visit.     No Known Allergies   Immunizations:     Immunization History   Administered Date(s) Administered   • COVID-19 PFIZER VACCINE 0.3 ML IM 03/10/2021, 03/31/2021, 10/08/2021   • COVID-19 Pfizer Vac BIVALENT Kulwinder-sucrose 12 Yr+ IM 10/26/2022   • Influenza, high dose seasonal 0.7 mL 02/02/2023, 12/06/2023   • Pneumococcal Conjugate 13-Valent 11/18/2015   • Pneumococcal Polysaccharide PPV23 11/21/2016   • Tdap 08/22/2021   • Zoster Vaccine Recombinant 06/20/2023      Health Maintenance:     There are no preventive care reminders to display for this patient.      Topic Date Due   • COVID-19 Vaccine (5 - 2023-24 season) 09/01/2023      Medicare Screening Tests and Risk Assessments:         Health Risk Assessment:   Patient rates overall health as good. Patient feels that their physical health rating is slightly worse. Patient is satisfied with their life. Eyesight was rated as same. Hearing was rated as same. Patient  feels that their emotional and mental health rating is same. Patients states they are never, rarely angry. Patient states they are never, rarely unusually tired/fatigued. Pain experienced in the last 7 days has been some. Patient's pain rating has been 2/10. Patient states that she has experienced no weight loss or gain in last 6 months.     Depression Screening:   PHQ-2 Score: 0      Fall Risk Screening:   In the past year, patient has experienced: history of falling in past year    Number of falls: 1  Injured during fall?: Yes    Feels unsteady when standing or walking?: Yes    Worried about falling?: Yes      Urinary Incontinence Screening:   Patient has not leaked urine accidently in the last six months.     Home Safety:  Patient does not have trouble with stairs inside or outside of their home. Patient has no working smoke alarms and has no working carbon monoxide detector. Home safety hazards include: none.     Nutrition:   Current diet is Regular.     Medications:   Patient is currently taking over-the-counter supplements. OTC medications include: see medication list. Patient is able to manage medications.     Activities of Daily Living (ADLs)/Instrumental Activities of Daily Living (IADLs):   Walk and transfer into and out of bed and chair?: Yes  Dress and groom yourself?: Yes    Bathe or shower yourself?: Yes    Feed yourself? Yes  Do your laundry/housekeeping?: Yes  Manage your money, pay your bills and track your expenses?: Yes  Make your own meals?: Yes    Do your own shopping?: Yes    Previous Hospitalizations:   Any hospitalizations or ED visits within the last 12 months?: Yes    How many hospitalizations have you had in the last year?: 1-2    Hospitalization Comments: Fall    Advance Care Planning:   Living will: No    Durable POA for healthcare: No      PREVENTIVE SCREENINGS      Cardiovascular Screening:    General: Screening Not Indicated and History Lipid Disorder      Diabetes Screening:      "General: Screening Not Indicated and History Diabetes      Cervical Cancer Screening:    General: Screening Not Indicated      Lung Cancer Screening:     General: Screening Not Indicated    Screening, Brief Intervention, and Referral to Treatment (SBIRT)    Screening      AUDIT-C Screenin) How often did you have a drink containing alcohol in the past year? 2 to 4 times a month  2) How many drinks did you have on a typical day when you were drinking in the past year? 1 to 2  3) How often did you have 6 or more drinks on one occasion in the past year? never    AUDIT-C Score: 2  Interpretation: Score 0-2 (female): Negative screen for alcohol misuse    Single Item Drug Screening:  How often have you used an illegal drug (including marijuana) or a prescription medication for non-medical reasons in the past year? never    Single Item Drug Screen Score: 0  Interpretation: Negative screen for possible drug use disorder    No results found.     Physical Exam:     /92 (BP Location: Right arm, Patient Position: Sitting, Cuff Size: Large)   Pulse 97   Temp 98.1 °F (36.7 °C) (Skin)   Ht 4' 7\" (1.397 m)   Wt 44.9 kg (99 lb)   SpO2 97%   BMI 23.01 kg/m²     Physical Exam     Jose R Yee MD  "

## 2024-03-06 NOTE — ASSESSMENT & PLAN NOTE
Patient is stable with current meds and discussed a low carb diet. Pt  recommended to see eye doctor and foot doctor for routine screening as per protocol. Recheck A1C  and Cr in 3 months. Patient questions answered today about this condtion.   Lab Results   Component Value Date    HGBA1C 6.0 (H) 04/27/2016

## 2024-03-06 NOTE — PROGRESS NOTES
Telephone call to patient, introduced self and role of complex care management.  She reports she is independent in her care and has  support from friends and family if she needs. We briefly reviewed home safety and prevention of falls.   She is scheduled to see PCP today and has transportation.  Declines further outreach at this time. Provided my name and contact information if future assistance is needed.

## 2024-03-06 NOTE — ASSESSMENT & PLAN NOTE
Lab Results   Component Value Date    EGFR 47 01/30/2024    EGFR 44 01/29/2024    EGFR 40 01/27/2024    CREATININE 1.08 01/30/2024    CREATININE 1.15 01/29/2024    CREATININE 1.24 01/27/2024   Pt to avoid NSAIDs and any IV dyes. Patient to follow up eoither with nephrology or  with us for  further  monitoring of  renal function.

## 2024-03-06 NOTE — PROGRESS NOTES
Name: Moriah Arreola      : 1940      MRN: [id number]  Encounter Provider: Jose R Yee MD  Encounter Date: 3/6/2024   Encounter department: [facility]    Assessment & Plan     1. Well adult exam    2. Stage 3b chronic kidney disease (HCC)  Assessment & Plan:  Lab Results   Component Value Date    EGFR 47 2024    EGFR 44 2024    EGFR 40 2024    CREATININE 1.08 2024    CREATININE 1.15 2024    CREATININE 1.24 2024   Pt to avoid NSAIDs and any IV dyes. Patient to follow up eoither with nephrology or  with us for  further  monitoring of  renal function.       3. Primary hypertension  Assessment & Plan:  Patient is stable with current anti-hypertensive medicine and continue to follow a low sodium diet and take current medication. All questions about this condition were answered today.       4. PAD (peripheral artery disease) (HCC)  Assessment & Plan:  Patient is stable  and will continue present plan of care and reassess at next routine visit. All questions about this problem from patient were answered today.       5. Hyperlipidemia, unspecified hyperlipidemia type  Assessment & Plan:  Patient  is stable with current medication and we discussed a low fat low cholesterol diet. Weight loss also discussed for this will help lower cholesterol also. Recheck lipids in 6 months.       6. Atrial fibrillation, unspecified type (HCC)  Assessment & Plan:  Continue with anticogulation and rate control of heart rate. Concerns about condition were addressed today.       7. Acute combined systolic and diastolic congestive heart failure (HCC)  -     losartan (COZAAR) 100 MG tablet; Take 1 tablet (100 mg total) by mouth daily    8. Mild protein-calorie malnutrition (HCC)    9. Acute on chronic combined systolic (congestive) and diastolic (congestive) heart failure (HCC)        Falls Plan of Care: balance, strength, and gait training instructions were provided.  Home safety education provided.     Urinary Incontinence Plan of Care: counseling topics discussed: practice Kegel (pelvic floor strengthening) exercises, use restroom every 2 hours, limit alcohol, caffeine, spicy foods, and acidic foods, limiting fluid intake 3-4 hours before bed, weight loss, preventing constipation and limiting fluid intake to 60 oz. per day.         Subjective     83-year-old female today for terrible multimedical POTS patient with hypertension history of A-fib hyperlipidemia peripheral artery disease here today for her Medicare wellness visit.  Patient has a blood pressure up a little bit today.  She also recently was in the hospital back in December with a fracture of her ribs.  Patient had 5 fractures in the right side of rib cage is doing better with that.  Patient blood pressure is on the high side today she is getting Lopressor and Toprol as per her cardiologist I will not touch that dosing that he has set up for her but I will increase her losartan from 50 to 100 mg to get better control of her blood pressure.  Will see the patient back in approximately 6 months.  For some reason the patient had diabetes in her chart and this is in the air she does not have any diabetes.      Review of Systems   Constitutional:  Negative for activity change, appetite change, fatigue and fever.   HENT:  Negative for congestion, ear pain, postnasal drip, rhinorrhea, sinus pressure, sinus pain, sneezing and sore throat.    Eyes:  Negative for pain and redness.   Respiratory:  Negative for apnea, cough, chest tightness, shortness of breath and wheezing.    Cardiovascular:  Negative for chest pain, palpitations and leg swelling.   Gastrointestinal:  Negative for abdominal pain, constipation, diarrhea, nausea and vomiting.   Endocrine: Negative for cold intolerance and heat intolerance.   Genitourinary:  Negative for difficulty urinating, dysuria, frequency, hematuria and urgency.   Musculoskeletal:  Positive  for gait problem. Negative for arthralgias, back pain and myalgias.   Skin:  Negative for rash.   Neurological:  Positive for weakness. Negative for dizziness, speech difficulty, numbness and headaches.   Hematological:  Does not bruise/bleed easily.   Psychiatric/Behavioral:  Negative for agitation, confusion and hallucinations.        Past Medical History:   Diagnosis Date   • Atrial fibrillation (HCC)    • Hypercholesteremia      Past Surgical History:   Procedure Laterality Date   • EMBOLIZATION     • HERNIA REPAIR       History reviewed. No pertinent family history.  Social History     Socioeconomic History   • Marital status:      Spouse name: None   • Number of children: None   • Years of education: None   • Highest education level: None   Occupational History   • None   Tobacco Use   • Smoking status: Former     Current packs/day: 0.00     Types: Cigarettes     Quit date:      Years since quittin.1   • Smokeless tobacco: Never   Vaping Use   • Vaping status: Never Used   Substance and Sexual Activity   • Alcohol use: Yes     Alcohol/week: 7.0 standard drinks of alcohol     Types: 7 Standard drinks or equivalent per week     Comment: one glass wine once a week   • Drug use: Not Currently   • Sexual activity: Not Currently   Other Topics Concern   • None   Social History Narrative   • None     Social Determinants of Health     Financial Resource Strain: Low Risk  (3/6/2024)    Overall Financial Resource Strain (CARDIA)    • Difficulty of Paying Living Expenses: Not hard at all   Food Insecurity: No Food Insecurity (2024)    Hunger Vital Sign    • Worried About Running Out of Food in the Last Year: Never true    • Ran Out of Food in the Last Year: Never true   Transportation Needs: No Transportation Needs (3/6/2024)    PRAPARE - Transportation    • Lack of Transportation (Medical): No    • Lack of Transportation (Non-Medical): No   Physical Activity: Not on file   Stress: Not on file    Social Connections: Not on file   Intimate Partner Violence: Not on file   Housing Stability: Low Risk  (1/26/2024)    Housing Stability Vital Sign    • Unable to Pay for Housing in the Last Year: No    • Number of Places Lived in the Last Year: 1    • Unstable Housing in the Last Year: No     Current Outpatient Medications on File Prior to Visit   Medication Sig   • atorvastatin (LIPITOR) 10 mg tablet Take 1 tablet (10 mg total) by mouth daily   • digoxin (LANOXIN) 0.125 mg tablet Take 1 tablet (125 mcg total) by mouth 4 (four) times a week Advised patient to take 5 times weekly   • furosemide (LASIX) 20 mg tablet Take 1 tablet (20 mg total) by mouth every other day   • lidocaine (LIDODERM) 5 % Apply 1 patch topically over 12 hours daily Remove & Discard patch within 12 hours or as directed by MD   • metoprolol succinate (TOPROL-XL) 25 mg 24 hr tablet Take 1 tablet (25 mg total) by mouth 3 (three) times a day   • metoprolol tartrate (LOPRESSOR) 100 mg tablet Take 1 tablet (100 mg total) by mouth every 12 (twelve) hours   • rivaroxaban (Xarelto) 15 mg tablet Take 1 tablet (15 mg total) by mouth every evening   • spironolactone (ALDACTONE) 25 mg tablet Take 0.5 tablets (12.5 mg total) by mouth every other day   • [DISCONTINUED] losartan (COZAAR) 50 mg tablet Take 1 tablet (50 mg total) by mouth daily   • acetaminophen (TYLENOL) 325 mg tablet Take 3 tablets (975 mg total) by mouth every 8 (eight) hours (Patient not taking: Reported on 3/6/2024)   • gabapentin (NEURONTIN) 100 mg capsule Take 1 capsule (100 mg total) by mouth daily at bedtime (Patient not taking: Reported on 3/6/2024)   • melatonin 3 mg Take 1 tablet (3 mg total) by mouth daily at bedtime (Patient not taking: Reported on 3/6/2024)   • OLANZapine (ZyPREXA ZYDIS) 5 mg dispersible tablet Take 1 tablet (5 mg total) by mouth daily at bedtime (Patient not taking: Reported on 3/6/2024)   • oxyCODONE (ROXICODONE) 5 immediate release tablet 2.5 mg to 5 mg PO  "every 4 hours as needed for moderate to severe pain. Ongoing therapy. (Patient not taking: Reported on 3/6/2024)   • senna-docusate sodium (SENOKOT S) 8.6-50 mg per tablet Take 1 tablet by mouth daily at bedtime (Patient not taking: Reported on 3/6/2024)     No Known Allergies  Immunization History   Administered Date(s) Administered   • COVID-19 PFIZER VACCINE 0.3 ML IM 03/10/2021, 03/31/2021, 10/08/2021   • COVID-19 Pfizer Vac BIVALENT Kulwinder-sucrose 12 Yr+ IM 10/26/2022   • Influenza, high dose seasonal 0.7 mL 02/02/2023, 12/06/2023   • Pneumococcal Conjugate 13-Valent 11/18/2015   • Pneumococcal Polysaccharide PPV23 11/21/2016   • Tdap 08/22/2021   • Zoster Vaccine Recombinant 06/20/2023       Objective     /92 (BP Location: Right arm, Patient Position: Sitting, Cuff Size: Large)   Pulse 97   Temp 98.1 °F (36.7 °C) (Skin)   Ht 4' 7\" (1.397 m)   Wt 44.9 kg (99 lb)   SpO2 97%   BMI 23.01 kg/m²     Physical Exam  Vitals and nursing note reviewed.   Constitutional:       Appearance: She is well-developed.   HENT:      Head: Normocephalic and atraumatic.      Nose: Nose normal.      Mouth/Throat:      Mouth: Mucous membranes are moist.   Eyes:      General: No scleral icterus.     Conjunctiva/sclera: Conjunctivae normal.      Pupils: Pupils are equal, round, and reactive to light.   Neck:      Thyroid: No thyromegaly.   Cardiovascular:      Rate and Rhythm: Normal rate and regular rhythm.   Pulmonary:      Effort: Pulmonary effort is normal.      Breath sounds: Normal breath sounds. No wheezing.   Abdominal:      General: Bowel sounds are normal. There is no distension.      Palpations: Abdomen is soft.      Tenderness: There is no abdominal tenderness. There is no guarding or rebound.   Musculoskeletal:         General: No tenderness or deformity. Normal range of motion.      Cervical back: Normal range of motion and neck supple.   Skin:     General: Skin is warm and dry.      Findings: No erythema or " rash.   Neurological:      Mental Status: She is alert and oriented to person, place, and time.      Sensory: No sensory deficit.      Gait: Gait abnormal.   Psychiatric:         Mood and Affect: Mood normal.         Behavior: Behavior normal.         Thought Content: Thought content normal.         Judgment: Judgment normal.       Jose R Yee MD

## 2024-03-20 ENCOUNTER — OFFICE VISIT (OUTPATIENT)
Dept: CARDIOLOGY CLINIC | Facility: CLINIC | Age: 84
End: 2024-03-20
Payer: COMMERCIAL

## 2024-03-20 VITALS
DIASTOLIC BLOOD PRESSURE: 92 MMHG | HEART RATE: 85 BPM | HEIGHT: 55 IN | WEIGHT: 99.2 LBS | OXYGEN SATURATION: 98 % | BODY MASS INDEX: 22.96 KG/M2 | SYSTOLIC BLOOD PRESSURE: 142 MMHG

## 2024-03-20 DIAGNOSIS — R06.02 SHORTNESS OF BREATH: ICD-10-CM

## 2024-03-20 DIAGNOSIS — I48.20 CHRONIC ATRIAL FIBRILLATION (HCC): ICD-10-CM

## 2024-03-20 DIAGNOSIS — I50.42 CHRONIC COMBINED SYSTOLIC AND DIASTOLIC CONGESTIVE HEART FAILURE (HCC): ICD-10-CM

## 2024-03-20 DIAGNOSIS — I11.0 HYPERTENSIVE HEART DISEASE WITH CHRONIC COMBINED SYSTOLIC AND DIASTOLIC CONGESTIVE HEART FAILURE (HCC): ICD-10-CM

## 2024-03-20 DIAGNOSIS — N18.32 STAGE 3B CHRONIC KIDNEY DISEASE (HCC): ICD-10-CM

## 2024-03-20 DIAGNOSIS — E78.5 DYSLIPIDEMIA: ICD-10-CM

## 2024-03-20 DIAGNOSIS — I50.42 HYPERTENSIVE HEART DISEASE WITH CHRONIC COMBINED SYSTOLIC AND DIASTOLIC CONGESTIVE HEART FAILURE (HCC): ICD-10-CM

## 2024-03-20 PROCEDURE — 93000 ELECTROCARDIOGRAM COMPLETE: CPT | Performed by: INTERNAL MEDICINE

## 2024-03-20 PROCEDURE — 99214 OFFICE O/P EST MOD 30 MIN: CPT | Performed by: INTERNAL MEDICINE

## 2024-03-20 RX ORDER — METOPROLOL SUCCINATE 100 MG/1
100 TABLET, EXTENDED RELEASE ORAL 2 TIMES DAILY
Qty: 180 TABLET | Refills: 1 | Status: SHIPPED | OUTPATIENT
Start: 2024-03-20

## 2024-03-20 NOTE — PROGRESS NOTES
Progress Note - Cardiology Office  [facility]    Moriah Arreola 84 y.o. female MRN: [id number]  : 1940  Encounter: [id number]      Assessment:     1. Chronic atrial fibrillation (HCC)    2. Shortness of breath    3. Chronic combined systolic and diastolic congestive heart failure (HCC)    4. Stage 3b chronic kidney disease (HCC)    5. Hypertensive heart disease with chronic combined systolic and diastolic congestive heart failure (HCC)    6. Dyslipidemia        Discussion Summary and Plan:    1. Atrial fibrillation with rapid ventricular rate.  Patient's heart rate remained faster.  She has appeared to be euvolemic.  Continue current dose of metoprolol and digoxin.  Her digoxin level is 1.2.  That was tested in 2024.    2. Chronic systolic and diastolic heart failure.  She is known to have cardiomyopathy her EF is around 30%.  She does not want defibrillator.  Hopefully when her heart rate improves her EF will be better.  She is already on losartan, metoprolol, Aldactone, and Lasix.  There is no leg swelling at this time.    3. Exertional shortness of breath.  Is feeling much better denies any shortness of breath with normal activities.  No evidence of volume overload.    4. Dyslipidemia.  Continue statins profile has been acceptable.    5. CRI.  She is on Xarelto 15 mg daily as her weight is only 48 kg.  She is also taking Lasix 20 mg every other day along with Aldactone 12.5 every other day and she is on heart failure medication with metoprolol and losartan.  She came with her friend.  Will continue Xarelto same dose.    6.  Labile hypertension.  Blood pressure is labile partially driven by her anxiety.  We will continue current Rx she takes losartan 100 mg daily, along with Lasix, along with Aldactone and metoprolol 100 mg twice a day and Toprol-XL 25 mg 3 times daily    Plan.    Increase metoprolol XL 25 mg 3 times daily and continue taking metoprolol  mg twice a day  as before    Continue digoxin as before    Continue current dose of diuretics and will check echo Doppler.      Continue other cardiac medication as before.    Follow-up 6 months.      Patient  And her friend was advised and educated to call our office  immediately if  patient has any new symptoms of chest pain/shortness of breath, near-syncope, syncope, light headedness sustained palpitations  or any other cardiovascular symptoms before their scheduled follow-up appointment.  Office #[phone].  Please call [phone] if any questions.  Counseling :  A description of the counseling.  Goals and Barriers.  Patient's ability to self care: Yes  Medication side effect reviewed with patient in detail and all their questions answered to their satisfaction.    HPI :     Moriah Arreola is a 84 y.o. year old female who came for follow up. Patient was in size recently admitted to [facility] with shortness of breath and was known to have chronic systolic and diastolic heart failure and chronic atrial fibrillation.  She also has CRI.  She used to follow with [facility] and has history of cardiomyopathy difficult to control rate.  Her rate was better with beta-blockers and digoxin however she was not taking metoprolol 50 twice a day instead of 100 twice a day.  She is also on heart failure medications.  She came with her friend.  Otherwise she is doing well leg edema has improved and her shortness of breath is much better.  Echo Doppler shows her EF is around 30%.      11/30/2022.    Above reviewed.  Patient came follow-up.  She was admitted to [facility] with AFib with rapid ventricular rate and chronic systolic and diastolic heart failure.  History of chronic atrial fibrillation.  She used to follow with [facility] and had history of difficulty control of heart rate.  She is known to have EF around 35 40%.  Her heart rate is better with beta-blocker and digoxin however  she was not taking the full dose.  Her blood test from August 2022 reviewed.  Her heart rate still remains elevated.  Is around 120 beats per minute.  She is taking metoprolol 100 twice a day and digoxin twice a week.    2/9/2023.    Above reviewed.  Patient came for follow-up.  She has a medical history significant for chronic systolic diastolic heart failure, chronic atrial fibrillation, dyslipidemia who came for follow-up.  Her heart rate has been difficult to control when she is in atrial fibrillation.  Her EF is around 35 to 40%.  She has blood test done on 1/10/2023 which shows her BUN was 29 creatinine 1.2.  And her cholesterol profile was acceptable her hemoglobin is stable.  She feels well she has no leg edema.  She drinks about 1500 cc encouraged her to drink about 1800 cc.  Sodium was 138.  EKG today shows atrial fibrillation heart rate ranging from 90 to 120 bpm.  She does not feel much palpitations.    8/25/2023.    Above reviewed.  Patient came for follow-up.  She is feeling well.  But blood pressure was high.  Heart rate has improved.  Her medication reviewed with her.  She had history of chronic systolic and diastolic heart failure, chronic atrial fibrillation, dyslipidemia who came for follow-up.  No leg swelling.  Her blood work from January 2020 reviewed.  She is scheduled to have repeat blood test.  No other cardiovascular issues at this time.  She does not monitor her blood pressure at home.  She feels well she came with her friend.    3/20/2024.    Above reviewed.  Patient came for follow-up she is feeling well heart rate has improved.  She had history of chronic systolic and diastolic heart failure, chronic atrial fibrillation, dyslipidemia, anxiety, labile hypertension who came for follow-up.  Recently her blood pressure was high and her losartan was increased to 100 mg daily.  She feels better today EKG reviewed with them.  Her last blood test from January 2024 shows sodium was 132  electrolytes were stable hemoglobin is better.  Rest of profile was done in January 23 acceptable.  Currently she is taking Lipitor 10 mg daily losartan 100 mg daily, Toprol- mg twice a day, Aldactone 12.5 mg every other day digoxin 120 mg 4 times weekly Xarelto 15 mg daily Lasix 20 mg every other day and additional dose of metoprolol XL 25 mg daily.    Review of Systems   Constitutional:  Negative for activity change, chills, diaphoresis, fever and unexpected weight change.   HENT:  Negative for congestion.    Eyes:  Negative for discharge and redness.   Respiratory:  Negative for cough, chest tightness, shortness of breath and wheezing.    Cardiovascular: Negative.  Negative for chest pain, palpitations and leg swelling.   Gastrointestinal:  Negative for abdominal pain, diarrhea and nausea.   Endocrine: Negative.    Genitourinary:  Negative for decreased urine volume and urgency.   Musculoskeletal: Negative.  Negative for arthralgias, back pain and gait problem.   Skin:  Negative for rash and wound.   Allergic/Immunologic: Negative.    Neurological:  Negative for dizziness, seizures, syncope, weakness, light-headedness and headaches.   Hematological: Negative.    Psychiatric/Behavioral:  Negative for agitation and confusion. The patient is not nervous/anxious.        Historical Information   Past Medical History:   Diagnosis Date   • Atrial fibrillation (HCC)    • Hypercholesteremia      Past Surgical History:   Procedure Laterality Date   • EMBOLIZATION     • HERNIA REPAIR       Social History     Substance and Sexual Activity   Alcohol Use Yes   • Alcohol/week: 7.0 standard drinks of alcohol   • Types: 7 Standard drinks or equivalent per week    Comment: one glass wine once a week     Social History     Substance and Sexual Activity   Drug Use Not Currently     Social History     Tobacco Use   Smoking Status Former   • Current packs/day: 0.00   • Types: Cigarettes   • Quit date: 2006   • Years since  quittin.2   Smokeless Tobacco Never     Family History: History reviewed. No pertinent family history.    Meds/Allergies     No Known Allergies    Current Outpatient Medications:   •  atorvastatin (LIPITOR) 10 mg tablet, Take 1 tablet (10 mg total) by mouth daily, Disp: 90 tablet, Rfl: 3  •  digoxin (LANOXIN) 0.125 mg tablet, Take 1 tablet (125 mcg total) by mouth 4 (four) times a week Advised patient to take 5 times weekly, Disp: 48 tablet, Rfl: 0  •  furosemide (LASIX) 20 mg tablet, Take 1 tablet (20 mg total) by mouth every other day, Disp: 15 tablet, Rfl: 5  •  lidocaine (LIDODERM) 5 %, Apply 1 patch topically over 12 hours daily Remove & Discard patch within 12 hours or as directed by MD, Disp: , Rfl:   •  losartan (COZAAR) 100 MG tablet, Take 1 tablet (100 mg total) by mouth daily, Disp: 90 tablet, Rfl: 1  •  metoprolol succinate (TOPROL-XL) 100 mg 24 hr tablet, Take 1 tablet (100 mg total) by mouth 2 (two) times a day, Disp: 180 tablet, Rfl: 1  •  metoprolol succinate (TOPROL-XL) 25 mg 24 hr tablet, Take 1 tablet (25 mg total) by mouth 3 (three) times a day, Disp: 270 tablet, Rfl: 1  •  rivaroxaban (Xarelto) 15 mg tablet, Take 1 tablet (15 mg total) by mouth every evening, Disp: 90 tablet, Rfl: 0  •  spironolactone (ALDACTONE) 25 mg tablet, Take 0.5 tablets (12.5 mg total) by mouth every other day, Disp: 45 tablet, Rfl: 2  •  acetaminophen (TYLENOL) 325 mg tablet, Take 3 tablets (975 mg total) by mouth every 8 (eight) hours (Patient not taking: Reported on 3/6/2024), Disp: , Rfl:   •  gabapentin (NEURONTIN) 100 mg capsule, Take 1 capsule (100 mg total) by mouth daily at bedtime (Patient not taking: Reported on 3/6/2024), Disp: 45 capsule, Rfl: 0  •  melatonin 3 mg, Take 1 tablet (3 mg total) by mouth daily at bedtime (Patient not taking: Reported on 3/6/2024), Disp: 10 tablet, Rfl: 0  •  OLANZapine (ZyPREXA ZYDIS) 5 mg dispersible tablet, Take 1 tablet (5 mg total) by mouth daily at bedtime (Patient not  "taking: Reported on 3/6/2024), Disp: 10 tablet, Rfl: 0  •  senna-docusate sodium (SENOKOT S) 8.6-50 mg per tablet, Take 1 tablet by mouth daily at bedtime (Patient not taking: Reported on 3/6/2024), Disp: , Rfl:     Vitals: Blood pressure 142/92, pulse 85, height 4' 7\" (1.397 m), weight 45 kg (99 lb 3.2 oz), SpO2 98%.  ?  Body mass index is 23.06 kg/m².  Wt Readings from Last 3 Encounters:   03/20/24 45 kg (99 lb 3.2 oz)   03/06/24 44.9 kg (99 lb)   01/25/24 44.6 kg (98 lb 6.4 oz)     Vitals:    03/20/24 1303   Weight: 45 kg (99 lb 3.2 oz)       BP Readings from Last 3 Encounters:   03/20/24 142/92   03/06/24 134/92   01/30/24 136/89         Physical Exam  Constitutional:       General: She is not in acute distress.     Appearance: She is well-developed. She is not diaphoretic.   Neck:      Thyroid: No thyromegaly.      Vascular: No JVD.   Cardiovascular:      Rate and Rhythm: Normal rate. Rhythm irregularly irregular.      Heart sounds: S1 normal and S2 normal. Heart sounds not distant. Murmur heard.      Systolic murmur is present.      No friction rub. No gallop. No S3 or S4 sounds.   Pulmonary:      Effort: Pulmonary effort is normal. No respiratory distress.      Breath sounds: Normal breath sounds. No wheezing or rales.   Chest:      Chest wall: No tenderness.   Abdominal:      General: There is no distension.      Palpations: Abdomen is soft.      Tenderness: There is no abdominal tenderness.   Musculoskeletal:         General: No deformity.      Cervical back: Neck supple.   Lymphadenopathy:      Cervical: No cervical adenopathy.   Skin:     General: Skin is warm and dry.      Coloration: Skin is not pale.      Findings: No rash.   Neurological:      Mental Status: She is alert and oriented to person, place, and time.   Psychiatric:         Judgment: Judgment normal.             Diagnostic Studies Review Cardio:  Echo Doppler reviewed.    To monitor.  Holter monitor done 12/6/2022 shows average heart rate 89 " bpm.  PACs and PVCs noted no other pauses.    EKG:  Twelve lead EKG 08/26/2022 shows with heart rate 115 beats per minute rare PVC noted    Twelve lead EKG done on 11/30/2022 shows atrial fibrillation with heart rate 120 beats per minute.  As compared to previous EKG no changes heart remains elevated    Twelve-lead EKG done on 2/9/2023 shows atrial fibrillation with heart rate ranging from 90 - 120 bpm.    Twelve-lead EKG done 8/25/2020 shows atrial fibrillation heart rate 98 bpm.  Heart rate better than before.    Twelve-lead EKG done 3/20/2024 shows atrial fibrillation heart rate 85 bpm as compared to previous EKGs heart rate is now better.    XR chest 1 view portable    Result Date: 8/14/2022  Narrative: CHEST INDICATION:   swelling. COMPARISON:  Chest radiograph from 11/6/2015. EXAM PERFORMED/VIEWS:  XR CHEST PORTABLE FINDINGS: Cardiomediastinal silhouette appears unremarkable. The lungs are clear.  No pneumothorax or pleural effusion. Osseous structures appear within normal limits for patient age.     Impression: No acute cardiopulmonary disease. Workstation performed: [id number]     Echo complete w/ contrast if indicated    Result Date: 8/15/2022  Narrative: •  Left Ventricle: Left ventricular cavity size is normal. Wall thickness is normal. Systolic function is severely reduced.  Estimated ejection fraction is 25-30%. Wall motion cannot be accurately assessed. Diastolic dysfunction, grade indeterminate due to underlying atrial fibrillation. •  Right Ventricle: Systolic function is reduced. •  Left Atrium: The atrium is severely dilated. •  Right Atrium: The atrium is moderately dilated. •  Mitral Valve: There is annular calcification. There is mild regurgitation. •  Tricuspid Valve: There is mild regurgitation. The right ventricular systolic pressure is mildly elevated.         Lab Review   Lab Results   Component Value Date    WBC 7.94 01/30/2024    HGB 13.7 01/30/2024    HCT 41.7 01/30/2024    MCV 95  "01/30/2024    RDW 12.8 01/30/2024     01/30/2024     BMP:  Lab Results   Component Value Date    SODIUM 132 (L) 01/30/2024    K 4.2 01/30/2024    CL 95 (L) 01/30/2024    CO2 25 01/30/2024    ANIONGAP 11.7 11/07/2015    BUN 32 (H) 01/30/2024    CREATININE 1.08 01/30/2024    GLUC 110 01/30/2024    CALCIUM 9.2 01/30/2024    CORRECTEDCA 9.3 08/15/2022    EGFR 47 01/30/2024    MG 2.2 01/27/2024     Troponins:    LFT:  Lab Results   Component Value Date    AST 21 01/26/2024    ALT 6 (L) 01/26/2024    ALKPHOS 56 01/26/2024    TP 6.6 01/26/2024    ALB 3.7 01/26/2024        Lab Results   Component Value Date    HGBA1C 6.0 (H) 04/27/2016     Lipid Profile:   Lab Results   Component Value Date    CHOLESTEROL 136 01/10/2023    HDL 28 (L) 01/10/2023    LDLCALC 81 01/10/2023    TRIG 178 (H) 01/10/2023     Lab Results   Component Value Date    CHOLESTEROL 136 01/10/2023     Lab Results   Component Value Date    TROPONINI <0.02 11/06/2015     Lab Results   Component Value Date    NTBNP 2,787 (H) 08/14/2022          Dr. Clau Vargas MD [facility]      \"This note has been constructed using a voice recognition system.Therefore there may be syntax, spelling, and/or grammatical errors. Please call if you have any questions. \"  "

## 2024-03-28 DIAGNOSIS — I48.20 CHRONIC ATRIAL FIBRILLATION (HCC): ICD-10-CM

## 2024-03-28 RX ORDER — RIVAROXABAN 15 MG/1
15 TABLET, FILM COATED ORAL EVERY EVENING
Qty: 90 TABLET | Refills: 1 | Status: SHIPPED | OUTPATIENT
Start: 2024-03-28

## 2024-04-05 ENCOUNTER — RA CDI HCC (OUTPATIENT)
Dept: OTHER | Facility: HOSPITAL | Age: 84
End: 2024-04-05

## 2024-04-11 ENCOUNTER — HOSPITAL ENCOUNTER (OUTPATIENT)
Dept: NON INVASIVE DIAGNOSTICS | Facility: CLINIC | Age: 84
Discharge: HOME | End: 2024-04-11
Payer: COMMERCIAL

## 2024-04-11 VITALS
BODY MASS INDEX: 22.91 KG/M2 | WEIGHT: 99 LBS | HEIGHT: 55 IN | SYSTOLIC BLOOD PRESSURE: 142 MMHG | DIASTOLIC BLOOD PRESSURE: 92 MMHG | HEART RATE: 85 BPM

## 2024-04-11 DIAGNOSIS — I11.0 HYPERTENSIVE HEART DISEASE WITH CHRONIC COMBINED SYSTOLIC AND DIASTOLIC CONGESTIVE HEART FAILURE (HCC): ICD-10-CM

## 2024-04-11 DIAGNOSIS — I50.42 HYPERTENSIVE HEART DISEASE WITH CHRONIC COMBINED SYSTOLIC AND DIASTOLIC CONGESTIVE HEART FAILURE (HCC): ICD-10-CM

## 2024-04-11 DIAGNOSIS — N18.32 STAGE 3B CHRONIC KIDNEY DISEASE (HCC): ICD-10-CM

## 2024-04-11 DIAGNOSIS — R06.02 SHORTNESS OF BREATH: ICD-10-CM

## 2024-04-11 DIAGNOSIS — I48.20 CHRONIC ATRIAL FIBRILLATION (HCC): ICD-10-CM

## 2024-04-11 DIAGNOSIS — E78.5 DYSLIPIDEMIA: ICD-10-CM

## 2024-04-11 DIAGNOSIS — I50.42 CHRONIC COMBINED SYSTOLIC AND DIASTOLIC CONGESTIVE HEART FAILURE (HCC): ICD-10-CM

## 2024-04-11 LAB
AORTIC ROOT: 2.8 CM
APICAL FOUR CHAMBER EJECTION FRACTION: 40 %
ASCENDING AORTA: 3.2 CM
BSA FOR ECHO PROCEDURE: 1.3 M2
E WAVE DECELERATION TIME: 120 MS
E/A RATIO: 48
FRACTIONAL SHORTENING: 8 (ref 28–44)
INTERVENTRICULAR SEPTUM IN DIASTOLE (PARASTERNAL SHORT AXIS VIEW): 0.9 CM
INTERVENTRICULAR SEPTUM: 0.9 CM (ref 0.6–1.1)
LAAS-AP2: 16.3 CM2
LAAS-AP4: 21.5 CM2
LEFT ATRIUM SIZE: 4.6 CM
LEFT ATRIUM VOLUME (MOD BIPLANE): 63 ML
LEFT ATRIUM VOLUME INDEX (MOD BIPLANE): 48.5 ML/M2
LEFT INTERNAL DIMENSION IN SYSTOLE: 3.6 CM (ref 2.1–4)
LEFT VENTRICLE DIASTOLIC VOLUME (MOD BIPLANE): 68 ML
LEFT VENTRICLE DIASTOLIC VOLUME INDEX (MOD BIPLANE): 52.3 ML/M2
LEFT VENTRICLE SYSTOLIC VOLUME (MOD BIPLANE): 46 ML
LEFT VENTRICLE SYSTOLIC VOLUME INDEX (MOD BIPLANE): 35.4 ML/M2
LEFT VENTRICULAR INTERNAL DIMENSION IN DIASTOLE: 3.9 CM (ref 3.5–6)
LEFT VENTRICULAR POSTERIOR WALL IN END DIASTOLE: 0.9 CM
LEFT VENTRICULAR STROKE VOLUME: 11 ML
LV EF: 33 %
LVSV (TEICH): 11 ML
MV E'TISSUE VEL-LAT: 14 CM/S
MV E'TISSUE VEL-SEP: 11 CM/S
MV PEAK A VEL: 0.02 M/S
MV PEAK E VEL: 96 CM/S
MV STENOSIS PRESSURE HALF TIME: 35 MS
MV VALVE AREA P 1/2 METHOD: 6.3
PISA MRMAX VEL: 0.36 M/S
PISA RADIUS: 0.4 CM
RA PRESSURE ESTIMATED: 3 MMHG
RIGHT ATRIUM AREA SYSTOLE A4C: 10.1 CM2
RIGHT VENTRICLE ID DIMENSION: 3.5 CM
RV PSP: 45 MMHG
SL CV LEFT ATRIUM LENGTH A2C: 4.5 CM
SL CV LV EF: 40
SL CV PED ECHO LEFT VENTRICLE DIASTOLIC VOLUME (MOD BIPLANE) 2D: 66 ML
SL CV PED ECHO LEFT VENTRICLE SYSTOLIC VOLUME (MOD BIPLANE) 2D: 56 ML
TR MAX PG: 42 MMHG
TR PEAK VELOCITY: 3.2 M/S
TRICUSPID ANNULAR PLANE SYSTOLIC EXCURSION: 0.9 CM
TRICUSPID VALVE PEAK REGURGITATION VELOCITY: 3.23 M/S

## 2024-04-11 PROCEDURE — C8929 TTE W OR WO FOL WCON,DOPPLER: HCPCS

## 2024-04-11 PROCEDURE — 93306 TTE W/DOPPLER COMPLETE: CPT | Performed by: INTERNAL MEDICINE

## 2024-04-11 RX ADMIN — PERFLUTREN 0.4 ML/MIN: 6.52 INJECTION, SUSPENSION INTRAVENOUS at 15:56

## 2024-04-12 ENCOUNTER — TELEPHONE (OUTPATIENT)
Dept: CARDIOLOGY CLINIC | Facility: CLINIC | Age: 84
End: 2024-04-12

## 2024-04-12 NOTE — TELEPHONE ENCOUNTER
Attempt to contact patient;no answer.  Spoke w/Marian;she will make contact with patient to have pt.call the clinical staff.

## 2024-04-12 NOTE — TELEPHONE ENCOUNTER
Attempted to transfer patient to triage line and nurse line but had no luck. Please call patient back at  [phone]

## 2024-04-12 NOTE — TELEPHONE ENCOUNTER
----- Message from Clau Vargas MD sent at 4/12/2024  8:10 AM EDT -----  Patient is echo Doppler reviewed.  Patient's EF has improved to 40%.  PA pressure is also decreased.  Continue same medications.  And keep appointment.

## 2024-04-19 ENCOUNTER — OFFICE VISIT (OUTPATIENT)
Dept: FAMILY MEDICINE CLINIC | Facility: CLINIC | Age: 84
End: 2024-04-19
Payer: COMMERCIAL

## 2024-04-19 VITALS
TEMPERATURE: 98.1 F | WEIGHT: 98 LBS | OXYGEN SATURATION: 98 % | DIASTOLIC BLOOD PRESSURE: 84 MMHG | HEIGHT: 55 IN | SYSTOLIC BLOOD PRESSURE: 136 MMHG | HEART RATE: 72 BPM | BODY MASS INDEX: 22.68 KG/M2

## 2024-04-19 DIAGNOSIS — I48.91 ATRIAL FIBRILLATION, UNSPECIFIED TYPE (HCC): ICD-10-CM

## 2024-04-19 DIAGNOSIS — E78.5 HYPERLIPIDEMIA, UNSPECIFIED HYPERLIPIDEMIA TYPE: ICD-10-CM

## 2024-04-19 DIAGNOSIS — I50.41 ACUTE COMBINED SYSTOLIC AND DIASTOLIC CONGESTIVE HEART FAILURE (HCC): ICD-10-CM

## 2024-04-19 DIAGNOSIS — I50.43 ACUTE ON CHRONIC COMBINED SYSTOLIC (CONGESTIVE) AND DIASTOLIC (CONGESTIVE) HEART FAILURE (HCC): Primary | ICD-10-CM

## 2024-04-19 DIAGNOSIS — N18.32 STAGE 3B CHRONIC KIDNEY DISEASE (HCC): ICD-10-CM

## 2024-04-19 DIAGNOSIS — I10 PRIMARY HYPERTENSION: ICD-10-CM

## 2024-04-19 DIAGNOSIS — I73.9 PAD (PERIPHERAL ARTERY DISEASE) (HCC): ICD-10-CM

## 2024-04-19 PROCEDURE — 99214 OFFICE O/P EST MOD 30 MIN: CPT | Performed by: FAMILY MEDICINE

## 2024-04-19 PROCEDURE — G2211 COMPLEX E/M VISIT ADD ON: HCPCS | Performed by: FAMILY MEDICINE

## 2024-04-19 RX ORDER — FUROSEMIDE 20 MG/1
20 TABLET ORAL EVERY OTHER DAY
Qty: 15 TABLET | Refills: 5 | Status: SHIPPED | OUTPATIENT
Start: 2024-04-19

## 2024-04-19 NOTE — ASSESSMENT & PLAN NOTE
Wt Readings from Last 3 Encounters:   04/11/24 44.9 kg (99 lb)   03/20/24 45 kg (99 lb 3.2 oz)   03/06/24 44.9 kg (99 lb)   Patient is stable  and will continue present plan of care and reassess at next routine visit. All questions about this problem from patient were answered today.

## 2024-04-19 NOTE — PROGRESS NOTES
Name: Moriah Arreola      : 1940      MRN: [id number]  Encounter Provider: Jose R Yee MD  Encounter Date: 2024   Encounter department: [facility]    Assessment & Plan     1. Acute on chronic combined systolic (congestive) and diastolic (congestive) heart failure (HCC)  Assessment & Plan:  Wt Readings from Last 3 Encounters:   24 44.9 kg (99 lb)   24 45 kg (99 lb 3.2 oz)   24 44.9 kg (99 lb)   Patient is stable  and will continue present plan of care and reassess at next routine visit. All questions about this problem from patient were answered today.               2. Atrial fibrillation, unspecified type (HCC)  Assessment & Plan:  Continue with anticogulation and rate control of heart rate. Concerns about condition were addressed today.       3. Hyperlipidemia, unspecified hyperlipidemia type    4. PAD (peripheral artery disease) (HCC)  Assessment & Plan:  Patient is stable  and will continue present plan of care and reassess at next routine visit. All questions about this problem from patient were answered today.       5. Primary hypertension  Assessment & Plan:  Patient is stable with current anti-hypertensive medicine and continue to follow a low sodium diet and take current medication. All questions about this condition were answered today.       6. Stage 3b chronic kidney disease (HCC)  Assessment & Plan:  Lab Results   Component Value Date    EGFR 47 2024    EGFR 44 2024    EGFR 40 2024    CREATININE 1.08 2024    CREATININE 1.15 2024    CREATININE 1.24 2024   Pt to avoid NSAIDs and any IV dyes. Patient to follow up eoither with nephrology or  with us for  further  monitoring of  renal function.           Urinary Incontinence Plan of Care: counseling topics discussed: practice Kegel (pelvic floor strengthening) exercises, use restroom every 2 hours, limit alcohol, caffeine, spicy foods, and acidic foods,  limiting fluid intake 3-4 hours before bed, weight loss, preventing constipation and limiting fluid intake to 60 oz. per day.       Subjective     84-year-old lady here today for checkup for multimedical problems.  Patient with history of congestive heart failure.  Patient also with A-fib hypertension hyperlipidemia peripheral artery disease end-stage 3B CKD.  Patient recently was seen in our office and had her Cozaar increased from 50 to 100 mg for little better blood pressure control and her blood pressure is doing a little bit better today.  She is doing no problems with her medications and will not need any refills at this point.  Patient recently had seen her cardiologist had an echocardiogram done and has an improvement in her ejection fraction to 40%.  This is a significantly good increase in her ejection fraction from her last one she had a few years ago.      Review of Systems   Constitutional:  Negative for activity change, appetite change, fatigue and fever.   HENT:  Negative for congestion, ear pain, postnasal drip, rhinorrhea, sinus pressure, sinus pain, sneezing and sore throat.    Eyes:  Negative for pain and redness.   Respiratory:  Negative for apnea, cough, chest tightness, shortness of breath and wheezing.    Cardiovascular:  Negative for chest pain, palpitations and leg swelling.   Gastrointestinal:  Negative for abdominal pain, constipation, diarrhea, nausea and vomiting.   Endocrine: Negative for cold intolerance and heat intolerance.   Genitourinary:  Negative for difficulty urinating, dysuria, frequency, hematuria and urgency.   Musculoskeletal:  Negative for arthralgias, back pain, gait problem and myalgias.   Skin:  Negative for rash.   Neurological:  Negative for dizziness, speech difficulty, weakness, numbness and headaches.   Hematological:  Does not bruise/bleed easily.   Psychiatric/Behavioral:  Negative for agitation, confusion and hallucinations.        Past Medical History:    Diagnosis Date   • Atrial fibrillation (HCC)    • Hypercholesteremia      Past Surgical History:   Procedure Laterality Date   • EMBOLIZATION     • HERNIA REPAIR       History reviewed. No pertinent family history.  Social History     Socioeconomic History   • Marital status:      Spouse name: None   • Number of children: None   • Years of education: None   • Highest education level: None   Occupational History   • None   Tobacco Use   • Smoking status: Former     Current packs/day: 0.00     Types: Cigarettes     Quit date:      Years since quittin.3   • Smokeless tobacco: Never   Vaping Use   • Vaping status: Never Used   Substance and Sexual Activity   • Alcohol use: Yes     Alcohol/week: 7.0 standard drinks of alcohol     Types: 7 Standard drinks or equivalent per week     Comment: one glass wine once a week   • Drug use: Not Currently   • Sexual activity: Not Currently   Other Topics Concern   • None   Social History Narrative   • None     Social Determinants of Health     Financial Resource Strain: Low Risk  (3/6/2024)    Overall Financial Resource Strain (CARDIA)    • Difficulty of Paying Living Expenses: Not hard at all   Food Insecurity: No Food Insecurity (2024)    Hunger Vital Sign    • Worried About Running Out of Food in the Last Year: Never true    • Ran Out of Food in the Last Year: Never true   Transportation Needs: No Transportation Needs (3/6/2024)    PRAPARE - Transportation    • Lack of Transportation (Medical): No    • Lack of Transportation (Non-Medical): No   Physical Activity: Not on file   Stress: Not on file   Social Connections: Not on file   Intimate Partner Violence: Not on file   Housing Stability: Low Risk  (2024)    Housing Stability Vital Sign    • Unable to Pay for Housing in the Last Year: No    • Number of Places Lived in the Last Year: 1    • Unstable Housing in the Last Year: No     Current Outpatient Medications on File Prior to Visit   Medication Sig    • atorvastatin (LIPITOR) 10 mg tablet Take 1 tablet (10 mg total) by mouth daily   • digoxin (LANOXIN) 0.125 mg tablet Take 1 tablet (125 mcg total) by mouth 4 (four) times a week Advised patient to take 5 times weekly   • furosemide (LASIX) 20 mg tablet Take 1 tablet (20 mg total) by mouth every other day   • lidocaine (LIDODERM) 5 % Apply 1 patch topically over 12 hours daily Remove & Discard patch within 12 hours or as directed by MD   • losartan (COZAAR) 100 MG tablet Take 1 tablet (100 mg total) by mouth daily   • metoprolol succinate (TOPROL-XL) 100 mg 24 hr tablet Take 1 tablet (100 mg total) by mouth 2 (two) times a day   • metoprolol succinate (TOPROL-XL) 25 mg 24 hr tablet Take 1 tablet (25 mg total) by mouth 3 (three) times a day   • spironolactone (ALDACTONE) 25 mg tablet Take 0.5 tablets (12.5 mg total) by mouth every other day   • Xarelto 15 MG tablet take 1 tablet by mouth every evening   • acetaminophen (TYLENOL) 325 mg tablet Take 3 tablets (975 mg total) by mouth every 8 (eight) hours (Patient not taking: Reported on 3/6/2024)   • gabapentin (NEURONTIN) 100 mg capsule Take 1 capsule (100 mg total) by mouth daily at bedtime (Patient not taking: Reported on 3/6/2024)   • melatonin 3 mg Take 1 tablet (3 mg total) by mouth daily at bedtime (Patient not taking: Reported on 3/6/2024)   • OLANZapine (ZyPREXA ZYDIS) 5 mg dispersible tablet Take 1 tablet (5 mg total) by mouth daily at bedtime (Patient not taking: Reported on 3/6/2024)   • senna-docusate sodium (SENOKOT S) 8.6-50 mg per tablet Take 1 tablet by mouth daily at bedtime (Patient not taking: Reported on 3/6/2024)     No Known Allergies  Immunization History   Administered Date(s) Administered   • COVID-19 PFIZER VACCINE 0.3 ML IM 03/10/2021, 03/31/2021, 10/08/2021   • COVID-19 Pfizer Vac BIVALENT Kulwinder-sucrose 12 Yr+ IM 10/26/2022   • Influenza, high dose seasonal 0.7 mL 02/02/2023, 12/06/2023   • Pneumococcal Conjugate 13-Valent 11/18/2015   •  "Pneumococcal Polysaccharide PPV23 11/21/2016   • Tdap 08/22/2021   • Zoster Vaccine Recombinant 06/20/2023       Objective     /84 (BP Location: Left arm, Patient Position: Sitting, Cuff Size: Large)   Pulse 72   Temp 98.1 °F (36.7 °C)   Ht 4' 7\" (1.397 m)   Wt 44.5 kg (98 lb)   SpO2 98%   BMI 22.78 kg/m²     Physical Exam  Vitals and nursing note reviewed.   Constitutional:       Appearance: She is well-developed.   HENT:      Head: Normocephalic and atraumatic.      Nose: Nose normal.      Mouth/Throat:      Mouth: Mucous membranes are moist.   Eyes:      General: No scleral icterus.     Conjunctiva/sclera: Conjunctivae normal.      Pupils: Pupils are equal, round, and reactive to light.   Neck:      Thyroid: No thyromegaly.   Cardiovascular:      Rate and Rhythm: Normal rate and regular rhythm.   Pulmonary:      Effort: Pulmonary effort is normal.      Breath sounds: Normal breath sounds. No wheezing.   Abdominal:      General: Bowel sounds are normal. There is no distension.      Palpations: Abdomen is soft.      Tenderness: There is no abdominal tenderness. There is no guarding or rebound.   Musculoskeletal:         General: No tenderness or deformity. Normal range of motion.      Cervical back: Normal range of motion and neck supple.   Skin:     General: Skin is warm and dry.      Findings: No erythema or rash.   Neurological:      Mental Status: She is alert and oriented to person, place, and time.      Sensory: No sensory deficit.   Psychiatric:         Mood and Affect: Mood normal.         Behavior: Behavior normal.         Thought Content: Thought content normal.         Judgment: Judgment normal.       Jose R Yee MD    "

## 2024-04-25 NOTE — H&P
H&P - Trauma   Moriah Arreola 83 y.o. female MRN: [id number]  Unit/Bed#: [facility] Encounter: [id number]    Trauma Alert: Evaluation; trauma team notified at 12:55pm via text   Model of Arrival: Ambulance    Trauma Team: Attending Fox and Residents Stefano  Consultants:     None     Assessment/Plan   Active Problems / Assessment:   Right 7-11 rib fractures  A-fib  CHF  CKD     Plan:   -Admit to general surgery; stepdown 1  - Respiratory protocol  - Incentive spirometry  - Supplemental oxygen as needed  - Pain Multimodal pain control  - Continue home metoprolol, digoxin, losartan, Lasix, and spironolactone  - Hold home Xarelto given concern for hemothorax  - Trend H&H  - DVT prophylaxis; Lovenox    History of Present Illness     Chief Complaint: Right chest pain  Mechanism:Fall     HPI:    Moriah Arreola is a 83 y.o. female who presents with with a past medical history of A-fib on digoxin, metoprolol, and Xarelto, CHF on Lasix, spironolactone, and losartan, CKD, DM 2, and HTN who presents as a transfer from an outside hospital after a fall being found to have right 7-11 minimally displaced rib fractures.  She reports that on 1/24 while in the bathroom she turned around too quickly and lost her balance falling on her right side directly into the tub.  Denies head strike and loss of consciousness.  She was able to get up but had persistent pain on her right side which brought her to the ED.  She denies fevers, chills, nausea, vomiting, and dysuria.  .    Review of Systems   Constitutional: Negative.    HENT: Negative.     Eyes: Negative.    Respiratory:  Negative for shortness of breath.         Pleuritic chest pain, right-sided   Cardiovascular: Negative.    Gastrointestinal: Negative.    Endocrine: Negative.    Genitourinary: Negative.    Musculoskeletal:         Right chest pain   Allergic/Immunologic: Negative.    Neurological: Negative.    Hematological: Negative.    Psychiatric/Behavioral: Negative.       12-point,  Medication: Metoprolol succinate   Last office visit date: 04/05/2024  Medication Refill Protocol Failed.  Medication Refill Protocol Failed. Courtesy Refill provided after reviewing last office note per protocol guidelines. Writer confirmed, courtesy refill was not a duplicate.    complete review of systems was reviewed and negative except as stated above.     Historical Information     Past Medical History:   Diagnosis Date    Atrial fibrillation (HCC)     Hypercholesteremia      Past Surgical History:   Procedure Laterality Date    EMBOLIZATION      HERNIA REPAIR          Social History     Tobacco Use    Smoking status: Former     Current packs/day: 0.00     Types: Cigarettes     Quit date:      Years since quittin.0    Smokeless tobacco: Never   Vaping Use    Vaping status: Never Used   Substance Use Topics    Alcohol use: Yes     Alcohol/week: 7.0 standard drinks of alcohol     Types: 7 Standard drinks or equivalent per week     Comment: one glass wine once a week    Drug use: Not Currently     Immunization History   Administered Date(s) Administered    COVID-19 PFIZER VACCINE 0.3 ML IM 03/10/2021, 2021, 10/08/2021    COVID-19 Pfizer Vac BIVALENT Kulwinder-sucrose 12 Yr+ IM 10/26/2022    Influenza, high dose seasonal 0.7 mL 2023, 2023    Tdap 2021     Last Tetanus: unkown  Family History: Non-contributory    1. Before the illness or injury that brought you to the Emergency, did you need someone to help you on a regular basis? 0=No   2. Since the illness or injury that brought you to the Emergency, have you needed more help than usual to take care of yourself? 0=No   3. Have you been hospitalized for one or more nights during the past 6 months (excluding a stay in the Emergency Department)? 1=Yes   4. In general, do you see well? 0=Yes   5. In general, do you have serious problems with your memory? 0=No   6. Do you take more than three different medications everyday? 1=Yes   TOTAL   2     Did you order a geriatric consult if the score was 2 or greater?: yes     Meds/Allergies   all current active meds have been reviewed   No Known Allergies    Objective   Initial Vitals:   Temperature: 98 °F (36.7 °C) (24 1255)  Pulse: 93 (24 1255)  Respirations: 20  (01/25/24 1255)  Blood Pressure: (!) 197/96 (01/25/24 1255)    Primary Survey:   Airway:        Status: patent;        Pre-hospital Interventions: none        Hospital Interventions: none  Breathing:        Pre-hospital Interventions: none       Effort: normal       Right breath sounds: normal       Left breath sounds: normal  Circulation:        Rhythm: regular       Rate: regular   Right Pulses Left Pulses    R radial: 2+  R femoral: 2+       L radial: 2+  L femoral: 2+         Disability:        GCS: Eye: 4; Verbal: 5 Motor: 6 Total: 15       Right Pupil: round;  reactive         Left Pupil:  round;  reactive      R Motor Strength L Motor Strength    R : 5/5  R dorsiflex: 5/5  R plantarflex: 5/5 L : 5/5  L dorsiflex: 5/5  L plantarflex: 5/5        Sensory:  No sensory deficit  Exposure:       Completed: Yes      Secondary Survey:  Physical Exam  Constitutional:       Appearance: Normal appearance.   HENT:      Head: Normocephalic and atraumatic.      Mouth/Throat:      Mouth: Mucous membranes are moist.   Eyes:      Extraocular Movements: Extraocular movements intact.      Conjunctiva/sclera: Conjunctivae normal.      Pupils: Pupils are equal, round, and reactive to light.   Cardiovascular:      Rate and Rhythm: Normal rate and regular rhythm.   Pulmonary:      Effort: Pulmonary effort is normal.      Breath sounds: Normal breath sounds.   Chest:      Chest wall: Tenderness present.   Abdominal:      General: Abdomen is flat. Bowel sounds are normal.      Palpations: Abdomen is soft.   Musculoskeletal:         General: Normal range of motion.      Cervical back: Normal range of motion and neck supple. No tenderness.   Skin:     General: Skin is warm and dry.   Neurological:      General: No focal deficit present.      Mental Status: She is alert and oriented to person, place, and time.   Psychiatric:         Mood and Affect: Mood normal.         Invasive Devices       Peripheral Intravenous Line  Duration              Peripheral IV 01/25/24 Left Antecubital <1 day                  Lab Results: I have personally reviewed all pertinent laboratory/test results 01/25/24 and in the preceding 24 hours.  Recent Labs     01/25/24  0916 01/25/24  0920   WBC 7.03  --    HGB 13.3  --    HCT 38.9  --      --    SODIUM  --  130*   K  --  4.3   CL  --  95*   CO2  --  28   BUN  --  19   CREATININE  --  0.96   GLUC  --  142*   AST  --  18   ALT  --  10   ALB  --  4.1   TBILI  --  1.65*   ALKPHOS  --  65   PTT 33  --    INR 1.52*  --        Imaging Results: I have personally reviewed pertinent images saved in PACS. CT scan findings (and other pertinent positive findings on images) were discussed with radiology. My interpretation of the images/reports are as follows:  Chest Xray(s): N/A   FAST exam(s): N/A   CT Scan(s): 1/25 CT CAP: Acute minimally displaced fractures of right-sided ribs 7 through 11. Small right pleural effusion. Incidental thyroid nodule(s) for which nonemergent thyroid ultrasound is recommended    1/25 CTh: Negative   Additional Xray(s): N/A     Other Studies: n/a    Code Status: Level 1 - Full Code  Advance Directive and Living Will:      Power of :    POLST:    I have spent 30 minutes with Patient  today in which greater than 50% of this time was spent in counseling/coordination of care regarding Diagnostic results.
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
## 2024-06-17 DIAGNOSIS — I48.91 ATRIAL FIBRILLATION WITH RVR (HCC): ICD-10-CM

## 2024-06-17 DIAGNOSIS — E78.5 HYPERLIPIDEMIA, UNSPECIFIED HYPERLIPIDEMIA TYPE: ICD-10-CM

## 2024-06-17 NOTE — TELEPHONE ENCOUNTER
Reason for call:   [x] Refill   [] Prior Auth  [] Other:     Office:   [] PCP/Provider -   [x] Specialty/Provider - Cardio/Clau Vargas MD     Medication:      Does the patient have enough for 3 days?   [x] Yes   [] No - Send as HP to POD

## 2024-06-17 NOTE — TELEPHONE ENCOUNTER
Patient called to request a refill for their atorvastatin (LIPITOR) 10 mg tablet. Advised a refill was requested today by the pharmacy and is pending approval. Patient verbalized understanding and is in agreement.

## 2024-06-18 RX ORDER — ATORVASTATIN CALCIUM 10 MG/1
10 TABLET, FILM COATED ORAL DAILY
Qty: 30 TABLET | Refills: 0 | Status: SHIPPED | OUTPATIENT
Start: 2024-06-18

## 2024-06-18 RX ORDER — DIGOXIN 125 MCG
125 TABLET ORAL
Qty: 48 TABLET | Refills: 1 | Status: SHIPPED | OUTPATIENT
Start: 2024-06-18

## 2024-07-16 DIAGNOSIS — E78.5 HYPERLIPIDEMIA, UNSPECIFIED HYPERLIPIDEMIA TYPE: ICD-10-CM

## 2024-07-16 NOTE — TELEPHONE ENCOUNTER
Reason for call:   [x] Refill   [] Prior Auth  [] Other:     Office:   [x] Specialty/Provider - Card / Sam    Medication: atorvastatin    Dose/Frequency: 10mg qd    Quantity: 100    Pharmacy: [id number] - [place] [address]     Does the patient have enough for 3 days?   [x] Yes   [] No - Send as HP to POD

## 2024-07-17 RX ORDER — ATORVASTATIN CALCIUM 10 MG/1
10 TABLET, FILM COATED ORAL DAILY
Qty: 180 TABLET | Refills: 0 | Status: SHIPPED | OUTPATIENT
Start: 2024-07-17

## 2024-08-09 PROBLEM — N17.9 AKI (ACUTE KIDNEY INJURY) (HCC): Status: RESOLVED | Noted: 2022-08-14 | Resolved: 2024-08-09

## 2024-08-09 PROBLEM — I13.0 HYPERTENSIVE HEART DISEASE WITH CONGESTIVE HEART FAILURE AND CHRONIC KIDNEY DISEASE (HCC): Status: ACTIVE | Noted: 2022-11-02

## 2024-09-05 DIAGNOSIS — I50.41 ACUTE COMBINED SYSTOLIC AND DIASTOLIC CONGESTIVE HEART FAILURE (HCC): ICD-10-CM

## 2024-09-05 DIAGNOSIS — I50.42 CHRONIC COMBINED SYSTOLIC AND DIASTOLIC CONGESTIVE HEART FAILURE (HCC): ICD-10-CM

## 2024-09-05 DIAGNOSIS — I48.20 CHRONIC ATRIAL FIBRILLATION (HCC): ICD-10-CM

## 2024-09-05 NOTE — TELEPHONE ENCOUNTER
Reason for call:   [x] Refill   [] Prior Auth  [] Other:     Office:   [x] PCP/Provider - [facility]   [] Specialty/Provider -     Medication: losartan (COZAAR) 100 MG tablet     Dose/Frequency: 100 mg, Daily     Quantity: 90    Pharmacy: [facility] #[id number]    Does the patient have enough for 3 days?   [x] Yes   [] No - Send as HP to POD

## 2024-09-05 NOTE — TELEPHONE ENCOUNTER
Reason for call:   [x] Refill   [] Prior Auth  [] Other:     Office:   [] PCP/Provider -   [x] Specialty/Provider - Cardiology [facility]     metoprolol succinate (TOPROL-XL) 100 mg 24 hr tablet    100 mg, 2 times daily   180    metoprolol succinate (TOPROL-XL) 25 mg 24 hr tablet    25 mg, 3 times daily   270      Xarelto 15 MG tablet    15 mg, Every evening    90  Pharmacy: [facility] #[id number]    Does the patient have enough for 3 days?   [x] Yes   [] No - Send as HP to POD

## 2024-09-06 ENCOUNTER — NURSE TRIAGE (OUTPATIENT)
Age: 84
End: 2024-09-06

## 2024-09-06 DIAGNOSIS — I48.20 CHRONIC ATRIAL FIBRILLATION (HCC): Primary | ICD-10-CM

## 2024-09-06 DIAGNOSIS — N18.9 CHRONIC RENAL IMPAIRMENT, UNSPECIFIED CKD STAGE: ICD-10-CM

## 2024-09-06 DIAGNOSIS — E78.5 DYSLIPIDEMIA: ICD-10-CM

## 2024-09-06 RX ORDER — METOPROLOL SUCCINATE 100 MG/1
100 TABLET, EXTENDED RELEASE ORAL 2 TIMES DAILY
Qty: 180 TABLET | Refills: 1 | Status: SHIPPED | OUTPATIENT
Start: 2024-09-06

## 2024-09-06 RX ORDER — LOSARTAN POTASSIUM 100 MG/1
100 TABLET ORAL DAILY
Qty: 90 TABLET | Refills: 1 | Status: SHIPPED | OUTPATIENT
Start: 2024-09-06

## 2024-09-06 RX ORDER — METOPROLOL SUCCINATE 25 MG/1
25 TABLET, EXTENDED RELEASE ORAL 3 TIMES DAILY
Qty: 270 TABLET | Refills: 1 | Status: SHIPPED | OUTPATIENT
Start: 2024-09-06

## 2024-09-06 NOTE — TELEPHONE ENCOUNTER
Kierra from [facility] called regarding pt's Metoprolol RX.     Advised per last OV with Dr. Vargas on 3/20:  Increase metoprolol XL 25 mg 3 times daily and continue taking metoprolol  mg twice a day as before

## 2024-09-06 NOTE — TELEPHONE ENCOUNTER
Patient needs updated blood work. Please place orders. A courtesy refill was provided for Rivaroxaban.   ALT  AST

## 2024-09-20 ENCOUNTER — TELEPHONE (OUTPATIENT)
Dept: CARDIOLOGY CLINIC | Facility: CLINIC | Age: 84
End: 2024-09-20

## 2024-09-20 ENCOUNTER — OFFICE VISIT (OUTPATIENT)
Dept: CARDIOLOGY CLINIC | Facility: CLINIC | Age: 84
End: 2024-09-20
Payer: COMMERCIAL

## 2024-09-20 VITALS
DIASTOLIC BLOOD PRESSURE: 100 MMHG | SYSTOLIC BLOOD PRESSURE: 178 MMHG | HEART RATE: 75 BPM | HEIGHT: 55 IN | OXYGEN SATURATION: 98 % | BODY MASS INDEX: 21.94 KG/M2 | WEIGHT: 94.8 LBS

## 2024-09-20 DIAGNOSIS — I48.20 CHRONIC ATRIAL FIBRILLATION (HCC): ICD-10-CM

## 2024-09-20 DIAGNOSIS — N18.32 STAGE 3B CHRONIC KIDNEY DISEASE (HCC): ICD-10-CM

## 2024-09-20 DIAGNOSIS — E78.5 DYSLIPIDEMIA: ICD-10-CM

## 2024-09-20 DIAGNOSIS — I11.0 HYPERTENSIVE HEART DISEASE WITH CHRONIC COMBINED SYSTOLIC AND DIASTOLIC CONGESTIVE HEART FAILURE (HCC): ICD-10-CM

## 2024-09-20 DIAGNOSIS — I50.42 HYPERTENSIVE HEART DISEASE WITH CHRONIC COMBINED SYSTOLIC AND DIASTOLIC CONGESTIVE HEART FAILURE (HCC): ICD-10-CM

## 2024-09-20 DIAGNOSIS — E87.1 HYPONATREMIA: ICD-10-CM

## 2024-09-20 DIAGNOSIS — R06.09 DYSPNEA ON EXERTION: ICD-10-CM

## 2024-09-20 DIAGNOSIS — I50.42 CHRONIC COMBINED SYSTOLIC AND DIASTOLIC CONGESTIVE HEART FAILURE (HCC): ICD-10-CM

## 2024-09-20 PROCEDURE — 99215 OFFICE O/P EST HI 40 MIN: CPT | Performed by: INTERNAL MEDICINE

## 2024-09-20 PROCEDURE — 93000 ELECTROCARDIOGRAM COMPLETE: CPT | Performed by: INTERNAL MEDICINE

## 2024-09-20 RX ORDER — AMLODIPINE BESYLATE 2.5 MG/1
2.5 TABLET ORAL DAILY
Qty: 90 TABLET | Refills: 1 | Status: SHIPPED | OUTPATIENT
Start: 2024-09-20

## 2024-09-20 RX ORDER — DAPAGLIFLOZIN 5 MG/1
5 TABLET, FILM COATED ORAL DAILY
Qty: 90 TABLET | Refills: 1 | Status: SHIPPED | OUTPATIENT
Start: 2024-09-20

## 2024-09-20 RX ORDER — METOPROLOL SUCCINATE 100 MG/1
100 TABLET, EXTENDED RELEASE ORAL 3 TIMES DAILY
Qty: 270 TABLET | Refills: 1 | Status: SHIPPED | OUTPATIENT
Start: 2024-09-20

## 2024-09-20 NOTE — PROGRESS NOTES
Progress Note - Cardiology Office  [facility]    Moriah Arreola 84 y.o. adult MRN: [id number]  : 1940  Encounter: [id number]      Assessment:     1. Chronic atrial fibrillation (HCC)    2. Chronic combined systolic and diastolic congestive heart failure (HCC)    3. Dyspnea on exertion    4. Hypertensive heart disease with chronic combined systolic and diastolic congestive heart failure (HCC)    5. Dyslipidemia    6. Stage 3b chronic kidney disease (HCC)    7. Hyponatremia        Discussion Summary and Plan:    1. Atrial fibrillation with suboptimal rate control.  Increase metoprolol XL to 100 mg 3 times daily she is on digoxin 0.125 every other day.  Will check electrolyte and digoxin level.    2. Chronic systolic and diastolic heart failure.  She is known to have cardiomyopathy her EF is around 30%.  Recent echo shows her EF is improved to 40%.  There is moderate mitral regurgitation and moderate TR.  Will continue diuretics.  Continue Toprol-XL continue losartan.  Need better blood pressure control will consider adding Farxiga 5 mg due to history of heart failure.    3. Exertional shortness of breath.  He has chronic shortness of breath.  Which is not changed she has bilateral coarse crackles but no evidence of volume overload.    4. Dyslipidemia.  Continue statins profile has been acceptable.    5. CRI.  She is on Xarelto 15 mg daily as her weight is only 48 kg.  She is also taking Lasix 20 mg every other day along with Aldactone 12.5 every other day and she is on heart failure medication with metoprolol and losartan.  She came with her friend.  Will continue Xarelto same dose.    6.  Labile hypertension.  Blood pressure is labile partially driven by her anxiety.  We will continue current Rx she takes losartan 100 mg daily, along with Lasix, along with Aldactone and Toprol-XL 3 times daily now along with amlodipine 2.5.  We also added Farxiga 5 mg daily.    Plan.    Patient added  Farxiga 5 mg daily.  Continue other heart failure medication as before add amlodipine 2.5 mg daily.  Metoprolol XL increased to 100 mg 3 times daily and she will be put on Zio patch her echo reviewed with her labs ordered.  If they have any problem with the medications they will give us a call.    Continue digoxin as before    Continue current dose of diuretics and will check echo Doppler.      Continue other cardiac medication as before.    Follow-up 6 months.      Patient  And her friend was advised and educated to call our office  immediately if  patient has any new symptoms of chest pain/shortness of breath, near-syncope, syncope, light headedness sustained palpitations  or any other cardiovascular symptoms before their scheduled follow-up appointment.  Office #[phone].  Please call [phone] if any questions.  Counseling :  A description of the counseling.  Goals and Barriers.  Patient's ability to self care: Yes  Medication side effect reviewed with patient in detail and all their questions answered to their satisfaction.    HPI :     Moriah Arreola is a 84 y.o. year old adult who came for follow up. Patient was in size recently admitted to [facility] with shortness of breath and was known to have chronic systolic and diastolic heart failure and chronic atrial fibrillation.  She also has CRI.  She used to follow with [facility] and has history of cardiomyopathy difficult to control rate.  Her rate was better with beta-blockers and digoxin however she was not taking metoprolol 50 twice a day instead of 100 twice a day.  She is also on heart failure medications.  She came with her friend.  Otherwise she is doing well leg edema has improved and her shortness of breath is much better.  Echo Doppler shows her EF is around 30%.          8/25/2023.    Above reviewed.  Patient came for follow-up.  She is feeling well.  But blood pressure was high.  Heart rate has improved.  Her  medication reviewed with her.  She had history of chronic systolic and diastolic heart failure, chronic atrial fibrillation, dyslipidemia who came for follow-up.  No leg swelling.  Her blood work from January 2020 reviewed.  She is scheduled to have repeat blood test.  No other cardiovascular issues at this time.  She does not monitor her blood pressure at home.  She feels well she came with her friend.    3/20/2024.    Above reviewed.  Patient came for follow-up she is feeling well heart rate has improved.  She had history of chronic systolic and diastolic heart failure, chronic atrial fibrillation, dyslipidemia, anxiety, labile hypertension who came for follow-up.  Recently her blood pressure was high and her losartan was increased to 100 mg daily.  She feels better today EKG reviewed with them.  Her last blood test from January 2024 shows sodium was 132 electrolytes were stable hemoglobin is better.  Rest of profile was done in January 23 acceptable.  Currently she is taking Lipitor 10 mg daily losartan 100 mg daily, Toprol- mg twice a day, Aldactone 12.5 mg every other day digoxin 120 mg 4 times weekly Xarelto 15 mg daily Lasix 20 mg every other day and additional dose of metoprolol XL 25 mg daily.    9/20/2024.    Above reviewed.  Patient came for follow-up.  Her EF is improved to 40%.  History of chronic systolic and diastolic heart failure, chronic atrial fibrillation, dyslipidemia, labile hypertension, anxiety who came for follow-up.  Her echo from 2024 reviewed her EF is improved to 40%.  She has better rate control now her current list of medications reviewed.  She is on losartan 100 mg daily, Metropol  twice a day plus additional 25 mg 3 times daily, Aldactone 12.5 mg every other day and digoxin 0.125 tablet 4 times weekly.  She is on Xarelto 15 mg daily in the evening for antithrombotic therapy.  She has history of chronic hyponatremia his last sodium was 132 fluid restriction recommended.   Her BUN is 32 and other electrolytes are stable.  Her last blood test was in January 2024.  She otherwise feels well.  She does get easily anxious but her blood pressure was high when checked by me.  Heart rate is 106 bpm.  She otherwise had no new complaints.  Her labs reviewed.    Review of Systems   Constitutional:  Negative for activity change, chills, diaphoresis, fever and unexpected weight change.   HENT:  Negative for congestion.    Eyes:  Negative for discharge and redness.   Respiratory:  Positive for shortness of breath. Negative for cough, chest tightness and wheezing.         Chronic exertional.   Cardiovascular: Negative.  Negative for chest pain, palpitations and leg swelling.   Gastrointestinal:  Negative for abdominal pain, diarrhea and nausea.   Endocrine: Negative.    Genitourinary:  Negative for decreased urine volume and urgency.   Musculoskeletal:  Positive for arthralgias and gait problem. Negative for back pain.   Skin:  Negative for rash and wound.   Allergic/Immunologic: Negative.    Neurological:  Negative for dizziness, seizures, syncope, weakness, light-headedness and headaches.   Hematological: Negative.    Psychiatric/Behavioral:  Negative for agitation and confusion. The patient is not nervous/anxious.        Historical Information   Past Medical History:   Diagnosis Date    JUANITA (acute kidney injury) (HCC) 08/14/2022    Atrial fibrillation (HCC)     Fall 01/28/2024    Hypercholesteremia      Past Surgical History:   Procedure Laterality Date    EMBOLIZATION      HERNIA REPAIR       Social History     Substance and Sexual Activity   Alcohol Use Yes    Alcohol/week: 7.0 standard drinks of alcohol    Types: 7 Standard drinks or equivalent per week    Comment: one glass wine once a week     Social History     Substance and Sexual Activity   Drug Use Not Currently     Social History     Tobacco Use   Smoking Status Former    Current packs/day: 0.00    Types: Cigarettes    Quit date: 2006     Years since quittin.7   Smokeless Tobacco Never     Family History: No family history on file.    Meds/Allergies     No Known Allergies    Current Outpatient Medications:     atorvastatin (LIPITOR) 10 mg tablet, Take 1 tablet (10 mg total) by mouth daily, Disp: 180 tablet, Rfl: 0    digoxin (LANOXIN) 0.125 mg tablet, Take 1 tablet (125 mcg total) by mouth 4 (four) times a week Advised patient to take 5 times weekly, Disp: 48 tablet, Rfl: 1    furosemide (LASIX) 20 mg tablet, take 1 tablet by mouth every other day, Disp: 15 tablet, Rfl: 5    lidocaine (LIDODERM) 5 %, Apply 1 patch topically over 12 hours daily Remove & Discard patch within 12 hours or as directed by MD, Disp: , Rfl:     losartan (COZAAR) 100 MG tablet, Take 1 tablet (100 mg total) by mouth daily, Disp: 90 tablet, Rfl: 1    metoprolol succinate (TOPROL-XL) 100 mg 24 hr tablet, Take 1 tablet (100 mg total) by mouth 2 (two) times a day, Disp: 180 tablet, Rfl: 1    metoprolol succinate (TOPROL-XL) 25 mg 24 hr tablet, Take 1 tablet (25 mg total) by mouth 3 (three) times a day, Disp: 270 tablet, Rfl: 1    rivaroxaban (Xarelto) 15 mg tablet, Take 1 tablet (15 mg total) by mouth every evening, Disp: 30 tablet, Rfl: 0    spironolactone (ALDACTONE) 25 mg tablet, Take 0.5 tablets (12.5 mg total) by mouth every other day, Disp: 45 tablet, Rfl: 2    acetaminophen (TYLENOL) 325 mg tablet, Take 3 tablets (975 mg total) by mouth every 8 (eight) hours (Patient not taking: Reported on 3/6/2024), Disp: , Rfl:     gabapentin (NEURONTIN) 100 mg capsule, Take 1 capsule (100 mg total) by mouth daily at bedtime (Patient not taking: Reported on 3/6/2024), Disp: 45 capsule, Rfl: 0    melatonin 3 mg, Take 1 tablet (3 mg total) by mouth daily at bedtime (Patient not taking: Reported on 3/6/2024), Disp: 10 tablet, Rfl: 0    OLANZapine (ZyPREXA ZYDIS) 5 mg dispersible tablet, Take 1 tablet (5 mg total) by mouth daily at bedtime (Patient not taking: Reported on 3/6/2024),  "Disp: 10 tablet, Rfl: 0    senna-docusate sodium (SENOKOT S) 8.6-50 mg per tablet, Take 1 tablet by mouth daily at bedtime (Patient not taking: Reported on 3/6/2024), Disp: , Rfl:     Vitals: Height 4' 7\" (1.397 m), weight 43 kg (94 lb 12.8 oz).    Body mass index is 22.03 kg/m².  Wt Readings from Last 3 Encounters:   09/20/24 43 kg (94 lb 12.8 oz)   04/19/24 44.5 kg (98 lb)   04/11/24 44.9 kg (99 lb)     Vitals:    09/20/24 1248   Weight: 43 kg (94 lb 12.8 oz)         BP Readings from Last 3 Encounters:   04/19/24 136/84   04/11/24 142/92   03/20/24 142/92         Physical Exam  Constitutional:       General: She is not in acute distress.     Appearance: She is well-developed. She is not diaphoretic.   Neck:      Thyroid: No thyromegaly.      Vascular: No JVD.   Cardiovascular:      Rate and Rhythm: Tachycardia present. Rhythm irregularly irregular.      Heart sounds: S1 normal and S2 normal. Heart sounds not distant. Murmur heard.      Systolic murmur is present.      No friction rub. No gallop. No S3 or S4 sounds.   Pulmonary:      Effort: Pulmonary effort is normal. No respiratory distress.      Breath sounds: No wheezing or rales.      Comments: Bilateral air entry with coarse breath sounds.  Chest:      Chest wall: No tenderness.   Abdominal:      General: There is no distension.      Palpations: Abdomen is soft.      Tenderness: There is no abdominal tenderness.   Musculoskeletal:         General: No deformity.      Cervical back: Neck supple.   Lymphadenopathy:      Cervical: No cervical adenopathy.   Skin:     General: Skin is warm and dry.      Coloration: Skin is not pale.      Findings: No rash.   Neurological:      Mental Status: She is alert and oriented to person, place, and time.   Psychiatric:         Judgment: Judgment normal.             Diagnostic Studies Review Cardio:  Echo Doppler reviewed.    To monitor.  Holter monitor done 12/6/2022 shows average heart rate 89 bpm.  PACs and PVCs noted no " other pauses.    EKG:  Twelve lead EKG 08/26/2022 shows with heart rate 115 beats per minute rare PVC noted    Twelve lead EKG done on 11/30/2022 shows atrial fibrillation with heart rate 120 beats per minute.  As compared to previous EKG no changes heart remains elevated    Twelve-lead EKG done on 2/9/2023 shows atrial fibrillation with heart rate ranging from 90 - 120 bpm.    Twelve-lead EKG done 8/25/2020 shows atrial fibrillation heart rate 98 bpm.  Heart rate better than before.    Twelve-lead EKG done 3/20/2024 shows atrial fibrillation heart rate 85 bpm as compared to previous EKGs heart rate is now better.    Twelve-lead EKG done on 9/20/2024 shows atrial fibrillation with heart rate 106 bpm.  As compared to previous EKG heart rate slightly faster.    XR chest 1 view portable    Result Date: 8/14/2022  Narrative: CHEST INDICATION:   swelling. COMPARISON:  Chest radiograph from 11/6/2015. EXAM PERFORMED/VIEWS:  XR CHEST PORTABLE FINDINGS: Cardiomediastinal silhouette appears unremarkable. The lungs are clear.  No pneumothorax or pleural effusion. Osseous structures appear within normal limits for patient age.     Impression: No acute cardiopulmonary disease. Workstation performed: [id number]     Echo complete w/ contrast if indicated    Result Date: 8/15/2022  Narrative:   Left Ventricle: Left ventricular cavity size is normal. Wall thickness is normal. Systolic function is severely reduced.  Estimated ejection fraction is 25-30%. Wall motion cannot be accurately assessed. Diastolic dysfunction, grade indeterminate due to underlying atrial fibrillation.   Right Ventricle: Systolic function is reduced.   Left Atrium: The atrium is severely dilated.   Right Atrium: The atrium is moderately dilated.   Mitral Valve: There is annular calcification. There is mild regurgitation.   Tricuspid Valve: There is mild regurgitation. The right ventricular systolic pressure is mildly elevated.         Lab Review   Lab  "Results   Component Value Date    WBC 7.94 01/30/2024    HGB 13.7 01/30/2024    HCT 41.7 01/30/2024    MCV 95 01/30/2024    RDW 12.8 01/30/2024     01/30/2024     BMP:  Lab Results   Component Value Date    SODIUM 132 (L) 01/30/2024    K 4.2 01/30/2024    CL 95 (L) 01/30/2024    CO2 25 01/30/2024    ANIONGAP 11.7 11/07/2015    BUN 32 (H) 01/30/2024    CREATININE 1.08 01/30/2024    GLUC 110 01/30/2024    CALCIUM 9.2 01/30/2024    CORRECTEDCA 9.3 08/15/2022    EGFR 47 01/30/2024    MG 2.2 01/27/2024     Troponins:    LFT:  Lab Results   Component Value Date    AST 21 01/26/2024    ALT 6 (L) 01/26/2024    ALKPHOS 56 01/26/2024    TP 6.6 01/26/2024    ALB 3.7 01/26/2024        Lab Results   Component Value Date    HGBA1C 6.0 (H) 04/27/2016     Lipid Profile:   Lab Results   Component Value Date    CHOLESTEROL 136 01/10/2023    HDL 28 (L) 01/10/2023    LDLCALC 81 01/10/2023    TRIG 178 (H) 01/10/2023     Lab Results   Component Value Date    CHOLESTEROL 136 01/10/2023     Lab Results   Component Value Date    TROPONINI <0.02 11/06/2015     Lab Results   Component Value Date    NTBNP 2,787 (H) 08/14/2022          Dr. Clau Vargas MD [facility]      \"This note has been constructed using a voice recognition system.Therefore there may be syntax, spelling, and/or grammatical errors. Please call if you have any questions. \"  "

## 2024-09-23 ENCOUNTER — TELEPHONE (OUTPATIENT)
Dept: CARDIOLOGY CLINIC | Facility: CLINIC | Age: 84
End: 2024-09-23

## 2024-10-08 ENCOUNTER — TELEPHONE (OUTPATIENT)
Dept: CARDIOLOGY CLINIC | Facility: CLINIC | Age: 84
End: 2024-10-08

## 2024-10-08 ENCOUNTER — CLINICAL SUPPORT (OUTPATIENT)
Dept: CARDIOLOGY CLINIC | Facility: CLINIC | Age: 84
End: 2024-10-08
Payer: COMMERCIAL

## 2024-10-08 DIAGNOSIS — I48.20 CHRONIC ATRIAL FIBRILLATION (HCC): ICD-10-CM

## 2024-10-08 PROCEDURE — 93244 EXT ECG>48HR<7D REV&INTERPJ: CPT | Performed by: INTERNAL MEDICINE

## 2024-10-08 NOTE — TELEPHONE ENCOUNTER
----- Message from Clau Vargas MD sent at 10/8/2024  9:31 AM EDT -----  Atrial Fibrillation occurred continuously (100% burden), ranging from 41- 153 bpm (avg of 73 bpm). Isolated VEs were rare (<1.0%), VE Couplets were rare (<1.0%), and no VE Triplets were present.     Holter monitor shows patient's average heart rate is 73 bpm continue same Rx as she is talking now that seem to be helping the heart better.

## 2024-10-15 DIAGNOSIS — I48.20 CHRONIC ATRIAL FIBRILLATION (HCC): ICD-10-CM

## 2024-10-15 NOTE — TELEPHONE ENCOUNTER
Reason for call:   [x] Refill   [] Prior Auth  [] Other:     Office:   [] PCP/Provider -   [x] Specialty/Provider - Clau Vargas MD     Medication: (Xarelto) 15 mg     Dose/Frequency: 1 tab daily / 90 tabs      Pharmacy: [id number] - [place] [address]      Does the patient have enough for 3 days?   [x] Yes   [] No - Send as HP to POD

## 2024-11-07 DIAGNOSIS — I48.91 ATRIAL FIBRILLATION WITH RVR (HCC): ICD-10-CM

## 2024-11-07 RX ORDER — DIGOXIN 125 MCG
125 TABLET ORAL
Qty: 48 TABLET | Refills: 0 | Status: SHIPPED | OUTPATIENT
Start: 2024-11-07

## 2024-11-07 NOTE — TELEPHONE ENCOUNTER
Reason for call:   [x] Refill   [] Prior Auth  [] Other:     Office:   [] PCP/Provider -   [x] Specialty/Provider - Cardio    Medication:   Digoxin 0.125 mg-  take 1 tablet by mouth 4 times a week    Pharmacy: [facility] [place]    Does the patient have enough for 3 days?   [x] Yes   [] No - Send as HP to POD

## 2024-11-13 ENCOUNTER — RA CDI HCC (OUTPATIENT)
Dept: RADIOLOGY | Facility: HOSPITAL | Age: 84
End: 2024-11-13

## 2024-11-21 ENCOUNTER — OFFICE VISIT (OUTPATIENT)
Dept: FAMILY MEDICINE CLINIC | Facility: CLINIC | Age: 84
End: 2024-11-21
Payer: COMMERCIAL

## 2024-11-21 VITALS
DIASTOLIC BLOOD PRESSURE: 90 MMHG | HEART RATE: 90 BPM | WEIGHT: 96 LBS | OXYGEN SATURATION: 97 % | BODY MASS INDEX: 22.21 KG/M2 | SYSTOLIC BLOOD PRESSURE: 148 MMHG | HEIGHT: 55 IN | TEMPERATURE: 98.2 F

## 2024-11-21 DIAGNOSIS — I50.42 CHRONIC COMBINED SYSTOLIC AND DIASTOLIC CONGESTIVE HEART FAILURE (HCC): ICD-10-CM

## 2024-11-21 DIAGNOSIS — I73.9 PAD (PERIPHERAL ARTERY DISEASE) (HCC): ICD-10-CM

## 2024-11-21 DIAGNOSIS — N18.32 STAGE 3B CHRONIC KIDNEY DISEASE (HCC): ICD-10-CM

## 2024-11-21 DIAGNOSIS — I50.43 ACUTE ON CHRONIC COMBINED SYSTOLIC (CONGESTIVE) AND DIASTOLIC (CONGESTIVE) HEART FAILURE (HCC): Primary | ICD-10-CM

## 2024-11-21 DIAGNOSIS — I48.91 ATRIAL FIBRILLATION, UNSPECIFIED TYPE (HCC): ICD-10-CM

## 2024-11-21 DIAGNOSIS — I11.0 HYPERTENSIVE HEART DISEASE WITH CHRONIC COMBINED SYSTOLIC AND DIASTOLIC CONGESTIVE HEART FAILURE (HCC): ICD-10-CM

## 2024-11-21 DIAGNOSIS — I50.42 HYPERTENSIVE HEART DISEASE WITH CHRONIC COMBINED SYSTOLIC AND DIASTOLIC CONGESTIVE HEART FAILURE (HCC): ICD-10-CM

## 2024-11-21 DIAGNOSIS — E78.5 HYPERLIPIDEMIA, UNSPECIFIED HYPERLIPIDEMIA TYPE: ICD-10-CM

## 2024-11-21 PROCEDURE — G2211 COMPLEX E/M VISIT ADD ON: HCPCS | Performed by: FAMILY MEDICINE

## 2024-11-21 PROCEDURE — 99214 OFFICE O/P EST MOD 30 MIN: CPT | Performed by: FAMILY MEDICINE

## 2024-11-21 RX ORDER — AMLODIPINE BESYLATE 2.5 MG/1
2.5 TABLET ORAL DAILY
Qty: 90 TABLET | Refills: 1 | Status: SHIPPED | OUTPATIENT
Start: 2024-11-21

## 2024-11-21 RX ORDER — DAPAGLIFLOZIN 5 MG/1
5 TABLET, FILM COATED ORAL DAILY
Qty: 90 TABLET | Refills: 1 | Status: SHIPPED | OUTPATIENT
Start: 2024-11-21

## 2024-11-21 NOTE — PROGRESS NOTES
Name: Moriah Arreola      : 1940      MRN: [id number]  Encounter Provider: Jose R Yee MD  Encounter Date: 2024   Encounter department: [facility]  :  Assessment & Plan  Acute on chronic combined systolic (congestive) and diastolic (congestive) heart failure (HCC)  Wt Readings from Last 3 Encounters:   24 43 kg (94 lb 12.8 oz)   24 44.5 kg (98 lb)   24 44.9 kg (99 lb)   Patient is stable  and will continue present plan of care and reassess at next routine visit. All questions about this problem from patient were answered today.                  Atrial fibrillation, unspecified type (HCC)  Continue with anticogulation and rate control of heart rate. Concerns about condition were addressed today.     Orders:    Comprehensive metabolic panel; Future    TSH, 3rd generation with Free T4 reflex; Future    Magnesium; Future    Hyperlipidemia, unspecified hyperlipidemia type  Patient  is stable with current medication and we discussed a low fat low cholesterol diet. Weight loss also discussed for this will help lower cholesterol also. Recheck lipids in 6 months.          PAD (peripheral artery disease) (HCC)  Patient is stable  and will continue present plan of care and reassess at next routine visit. All questions about this problem from patient were answered today.          Stage 3b chronic kidney disease (HCC)  Lab Results   Component Value Date    EGFR 47 2024    EGFR 44 2024    EGFR 40 2024    CREATININE 1.08 2024    CREATININE 1.15 2024    CREATININE 1.24 2024   Pt to avoid NSAIDs and any IV dyes. Patient to follow up eoither with nephrology or  with us for  further  monitoring of  renal function.          Hypertensive heart disease with chronic combined systolic and diastolic congestive heart failure (HCC)  Wt Readings from Last 3 Encounters:   24 43.5 kg (96 lb)   24 43 kg (94 lb 12.8 oz)   24 44.5  kg (98 lb)               Orders:    amLODIPine (NORVASC) 2.5 mg tablet; Take 1 tablet (2.5 mg total) by mouth daily    Chronic combined systolic and diastolic congestive heart failure (HCC)  Wt Readings from Last 3 Encounters:   11/21/24 43.5 kg (96 lb)   09/20/24 43 kg (94 lb 12.8 oz)   04/19/24 44.5 kg (98 lb)               Orders:    dapagliflozin (Farxiga) 5 MG TABS; Take 1 tablet (5 mg total) by mouth daily          Urinary Incontinence Plan of Care: counseling topics discussed: practice Kegel (pelvic floor strengthening) exercises, use restroom every 2 hours, limit alcohol, caffeine, spicy foods, and acidic foods, limiting fluid intake 3-4 hours before bed, weight loss, preventing constipation and limiting fluid intake to 60 oz. per day.     History of Present Illness     84-year-old female today for checkup on multimedical problems patient with history of A-fib hypertension heart failure CKD stage III AA PAD and is stable.  Patient here today to review her medicines as well as her blood pressure blood pressure was somewhat high today.  Upon interview reviewing patient's labs she is labs are still pending and it was ordered by her cardiologist which she will do before she sees her cardiologist in the spring.  I have ordered some labs that she is due for when she gets that done in the springtime.  Reviewed her medications and we discovered that though medicines Farxiga and amlodipine that was ordered by her cardiologist that she was to start in September was never picked up at the pharmacy and she has not been taking them.  I have had take the liberty of ordering them for her and she will start those and I will check her back in approximately 6 weeks to recheck her blood pressure.  Otherwise patient is doing very well.      Review of Systems       Objective   There were no vitals taken for this visit.     Physical Exam

## 2024-11-21 NOTE — ASSESSMENT & PLAN NOTE
Continue with anticogulation and rate control of heart rate. Concerns about condition were addressed today.     Orders:    Comprehensive metabolic panel; Future    TSH, 3rd generation with Free T4 reflex; Future    Magnesium; Future

## 2024-11-21 NOTE — ASSESSMENT & PLAN NOTE
Wt Readings from Last 3 Encounters:   09/20/24 43 kg (94 lb 12.8 oz)   04/19/24 44.5 kg (98 lb)   04/11/24 44.9 kg (99 lb)   Patient is stable  and will continue present plan of care and reassess at next routine visit. All questions about this problem from patient were answered today.

## 2025-01-03 ENCOUNTER — OFFICE VISIT (OUTPATIENT)
Dept: FAMILY MEDICINE CLINIC | Facility: CLINIC | Age: 85
End: 2025-01-03
Payer: COMMERCIAL

## 2025-01-03 VITALS
TEMPERATURE: 97.9 F | BODY MASS INDEX: 22.21 KG/M2 | HEIGHT: 55 IN | OXYGEN SATURATION: 97 % | HEART RATE: 59 BPM | WEIGHT: 96 LBS | SYSTOLIC BLOOD PRESSURE: 170 MMHG | DIASTOLIC BLOOD PRESSURE: 98 MMHG

## 2025-01-03 DIAGNOSIS — E44.1 MILD PROTEIN-CALORIE MALNUTRITION (HCC): ICD-10-CM

## 2025-01-03 DIAGNOSIS — Z79.4 LONG TERM (CURRENT) USE OF INSULIN (HCC): ICD-10-CM

## 2025-01-03 DIAGNOSIS — I73.9 PAD (PERIPHERAL ARTERY DISEASE) (HCC): ICD-10-CM

## 2025-01-03 DIAGNOSIS — I50.42 HYPERTENSIVE HEART DISEASE WITH CHRONIC COMBINED SYSTOLIC AND DIASTOLIC CONGESTIVE HEART FAILURE (HCC): ICD-10-CM

## 2025-01-03 DIAGNOSIS — E78.5 HYPERLIPIDEMIA, UNSPECIFIED HYPERLIPIDEMIA TYPE: ICD-10-CM

## 2025-01-03 DIAGNOSIS — I11.0 HYPERTENSIVE HEART DISEASE WITH CHRONIC COMBINED SYSTOLIC AND DIASTOLIC CONGESTIVE HEART FAILURE (HCC): ICD-10-CM

## 2025-01-03 DIAGNOSIS — N18.32 STAGE 3B CHRONIC KIDNEY DISEASE (HCC): ICD-10-CM

## 2025-01-03 DIAGNOSIS — I48.91 ATRIAL FIBRILLATION, UNSPECIFIED TYPE (HCC): ICD-10-CM

## 2025-01-03 DIAGNOSIS — I50.43 ACUTE ON CHRONIC COMBINED SYSTOLIC (CONGESTIVE) AND DIASTOLIC (CONGESTIVE) HEART FAILURE (HCC): Primary | ICD-10-CM

## 2025-01-03 PROCEDURE — G2211 COMPLEX E/M VISIT ADD ON: HCPCS | Performed by: FAMILY MEDICINE

## 2025-01-03 PROCEDURE — 99214 OFFICE O/P EST MOD 30 MIN: CPT | Performed by: FAMILY MEDICINE

## 2025-01-03 RX ORDER — AMLODIPINE BESYLATE 5 MG/1
5 TABLET ORAL DAILY
Qty: 30 TABLET | Refills: 1 | Status: SHIPPED | OUTPATIENT
Start: 2025-01-03

## 2025-01-03 NOTE — ASSESSMENT & PLAN NOTE
Wt Readings from Last 3 Encounters:   01/03/25 43.5 kg (96 lb)   11/21/24 43.5 kg (96 lb)   09/20/24 43 kg (94 lb 12.8 oz)   Patient is stable  and will continue present plan of care and reassess at next routine visit. All questions about this problem from patient were answered today.

## 2025-01-03 NOTE — PROGRESS NOTES
Name: Moriah Arreola      : 1940      MRN: [id number]  Encounter Provider: Jose R Yee MD  Encounter Date: 1/3/2025   Encounter department: [facility]  :  Assessment & Plan  Acute on chronic combined systolic (congestive) and diastolic (congestive) heart failure (HCC)  Wt Readings from Last 3 Encounters:   25 43.5 kg (96 lb)   24 43.5 kg (96 lb)   24 43 kg (94 lb 12.8 oz)   Patient is stable  and will continue present plan of care and reassess at next routine visit. All questions about this problem from patient were answered today.                Atrial fibrillation, unspecified type (HCC)  Continue with anticogulation and rate control of heart rate. Concerns about condition were addressed today.        Hyperlipidemia, unspecified hyperlipidemia type  Patient  is stable with current medication and we discussed a low fat low cholesterol diet. Weight loss also discussed for this will help lower cholesterol also. Recheck lipids in 6 months.        Stage 3b chronic kidney disease (HCC)  Lab Results   Component Value Date    EGFR 47 2024    EGFR 44 2024    EGFR 40 2024    CREATININE 1.08 2024    CREATININE 1.15 2024    CREATININE 1.24 2024   Pt to avoid NSAIDs and any IV dyes. Patient to follow up eoither with nephrology or  with us for  further  monitoring of  renal function.        Hypertensive heart disease with chronic combined systolic and diastolic congestive heart failure (HCC)  Wt Readings from Last 3 Encounters:   25 43.5 kg (96 lb)   24 43.5 kg (96 lb)   24 43 kg (94 lb 12.8 oz)             Orders:  •  amLODIPine (NORVASC) 5 mg tablet; Take 1 tablet (5 mg total) by mouth daily    Long term (current) use of insulin (HCC)    Mild protein-calorie malnutrition (HCC)    PAD (peripheral artery disease) (HCC)          Depression Screening and Follow-up Plan:   Patient's depression screening was negative  with an [place]  Depression Scale score of  .     History of Present Illness     84-year-old female here today with multimedical problems here for checkup for her hypertension can his congestive heart failure history of hypertension A-fib CKD stage IIIb and CHF.  Patient is back on her medicines as we discussed in her last visit she is now getting her amlodipine 2.5 mg as well as Farxiga that was prescribed for her by her cardiologist.  We are going to increase her amlodipine to the 5 mg dose because her blood pressure is still on the high side and we will can titrate up is much as we can to get her blood pressure in a stable blood pressure range we will bring her back in approximately 4 to 6 weeks to recheck her blood pressure after we increase her amlodipine from 2.5 to 5 mg.  We will refill her amlodipine today for 5 mg daily and we will see her back in 4 to 6 weeks.      Review of Systems   Constitutional:  Negative for activity change, appetite change, fatigue and fever.   HENT:  Negative for congestion, ear pain, postnasal drip, rhinorrhea, sinus pressure, sinus pain, sneezing and sore throat.    Eyes:  Negative for pain and redness.   Respiratory:  Negative for apnea, cough, chest tightness, shortness of breath and wheezing.    Cardiovascular:  Negative for chest pain, palpitations and leg swelling.   Gastrointestinal:  Negative for abdominal pain, constipation, diarrhea, nausea and vomiting.   Endocrine: Negative for cold intolerance and heat intolerance.   Genitourinary:  Negative for difficulty urinating, dysuria, frequency, hematuria and urgency.   Musculoskeletal:  Negative for arthralgias, back pain, gait problem and myalgias.   Skin:  Negative for rash.   Neurological:  Negative for dizziness, speech difficulty, weakness, numbness and headaches.   Hematological:  Does not bruise/bleed easily.   Psychiatric/Behavioral:  Negative for agitation, confusion and hallucinations.        Objective  "  /98 (BP Location: Left arm, Patient Position: Sitting, Cuff Size: Standard)   Pulse 59   Temp 97.9 °F (36.6 °C) (Temporal)   Ht 4' 7\" (1.397 m)   Wt 43.5 kg (96 lb)   SpO2 97%   BMI 22.31 kg/m²      Physical Exam  Constitutional:       Appearance: Normal appearance. She is not ill-appearing.   HENT:      Head: Normocephalic and atraumatic.      Right Ear: Tympanic membrane normal.      Left Ear: Tympanic membrane normal.      Nose: Nose normal.      Mouth/Throat:      Mouth: Mucous membranes are moist.   Eyes:      Extraocular Movements: Extraocular movements intact.      Conjunctiva/sclera: Conjunctivae normal.      Pupils: Pupils are equal, round, and reactive to light.   Cardiovascular:      Rate and Rhythm: Normal rate and regular rhythm.   Pulmonary:      Effort: Pulmonary effort is normal. No respiratory distress.      Breath sounds: Normal breath sounds. No wheezing.   Abdominal:      General: Bowel sounds are normal.      Palpations: Abdomen is soft.      Tenderness: There is no abdominal tenderness.   Musculoskeletal:         General: No tenderness. Normal range of motion.      Cervical back: Normal range of motion and neck supple.      Right lower leg: No edema.      Left lower leg: No edema.   Skin:     General: Skin is warm and dry.   Neurological:      Mental Status: She is alert and oriented to person, place, and time.   Psychiatric:         Mood and Affect: Mood normal.         Behavior: Behavior normal.         Thought Content: Thought content normal.         Judgment: Judgment normal.         "

## 2025-01-06 DIAGNOSIS — I48.20 CHRONIC ATRIAL FIBRILLATION (HCC): ICD-10-CM

## 2025-01-06 NOTE — TELEPHONE ENCOUNTER
Patients friend Carlee called for refill of her Xarelto - looks like pharmacy has refill on file but could not advise friend that there were refills due to no consent.    Reason for call:   [x] Refill   [] Prior Auth  [] Other:     Office:   [] PCP/Provider -   [x] Specialty/Provider - Clau Vargas     Medication: Xarelto     Dose/Frequency: 15mg daily     Quantity: 90    Pharmacy: [facility] #[id number]    Does the patient have enough for 3 days?   [x] Yes   [] No - Send as HP to POD

## 2025-01-10 DIAGNOSIS — E78.5 HYPERLIPIDEMIA, UNSPECIFIED HYPERLIPIDEMIA TYPE: ICD-10-CM

## 2025-01-10 NOTE — TELEPHONE ENCOUNTER
Reason for call:   [x] Refill   [] Prior Auth  [] Other:     Office:   [] PCP/Provider -   [x] Specialty/Provider - Cardio    Medication: atorvastatin (LIPITOR) 10 mg tablet Take 1 tablet (10 mg total) by mouth daily     Pharmacy: [id number] - [place] - [address] [place] [address]     Does the patient have enough for 3 days?   [x] Yes   [] No - Send as HP to POD

## 2025-01-13 RX ORDER — ATORVASTATIN CALCIUM 10 MG/1
10 TABLET, FILM COATED ORAL DAILY
Qty: 30 TABLET | Refills: 0 | Status: SHIPPED | OUTPATIENT
Start: 2025-01-13

## 2025-01-20 DIAGNOSIS — I48.91 ATRIAL FIBRILLATION WITH RVR (HCC): ICD-10-CM

## 2025-01-20 NOTE — TELEPHONE ENCOUNTER
Reason for call:   [x] Refill   [] Prior Auth  [] Other:     Office:   [] PCP/Provider -   [x] Specialty/Provider - Card Clau Vargas    Medication: Digoxin     Dose/Frequency:  0.125 mcg 5 times a week    Quantity: 48    Pharmacy: [facility] [place] [facility]    Does the patient have enough for 3 days?   [x] Yes   [] No - Send as HP to POD

## 2025-01-21 RX ORDER — DIGOXIN 125 MCG
125 TABLET ORAL
Qty: 48 TABLET | Refills: 0 | Status: SHIPPED | OUTPATIENT
Start: 2025-01-21

## 2025-02-06 ENCOUNTER — RA CDI HCC (OUTPATIENT)
Dept: RADIOLOGY | Facility: HOSPITAL | Age: 85
End: 2025-02-06

## 2025-02-07 DIAGNOSIS — E78.5 HYPERLIPIDEMIA, UNSPECIFIED HYPERLIPIDEMIA TYPE: ICD-10-CM

## 2025-02-07 NOTE — TELEPHONE ENCOUNTER
Patient's family member aware of labs needing to be done. Per family member, was told by provider to have them done in March. Family member requesting 90 day script.     Reason for call:   [x] Refill   [] Prior Auth  [] Other:     Office:   [] PCP/Provider -   [x] Specialty/Provider - Cardio    Medication: atorvastatin (LIPITOR) 10 mg     Dose/Frequency: Take 1 tablet (10 mg total) by mouth daily     Quantity: 90    Pharmacy: [id number] - [place] [address]     Does the patient have enough for 3 days?   [x] Yes   [] No - Send as HP to POD

## 2025-02-10 RX ORDER — ATORVASTATIN CALCIUM 10 MG/1
10 TABLET, FILM COATED ORAL DAILY
Qty: 90 TABLET | Refills: 0 | Status: SHIPPED | OUTPATIENT
Start: 2025-02-10

## 2025-02-14 ENCOUNTER — OFFICE VISIT (OUTPATIENT)
Dept: FAMILY MEDICINE CLINIC | Facility: CLINIC | Age: 85
End: 2025-02-14
Payer: COMMERCIAL

## 2025-02-14 VITALS
SYSTOLIC BLOOD PRESSURE: 130 MMHG | HEART RATE: 71 BPM | DIASTOLIC BLOOD PRESSURE: 82 MMHG | BODY MASS INDEX: 22.45 KG/M2 | HEIGHT: 55 IN | WEIGHT: 97 LBS | OXYGEN SATURATION: 97 % | TEMPERATURE: 98.4 F

## 2025-02-14 DIAGNOSIS — N18.32 STAGE 3B CHRONIC KIDNEY DISEASE (HCC): ICD-10-CM

## 2025-02-14 DIAGNOSIS — E78.5 HYPERLIPIDEMIA, UNSPECIFIED HYPERLIPIDEMIA TYPE: ICD-10-CM

## 2025-02-14 DIAGNOSIS — I50.43 ACUTE ON CHRONIC COMBINED SYSTOLIC (CONGESTIVE) AND DIASTOLIC (CONGESTIVE) HEART FAILURE (HCC): Primary | ICD-10-CM

## 2025-02-14 DIAGNOSIS — I13.0 HYPERTENSIVE HEART DISEASE WITH CONGESTIVE HEART FAILURE AND CHRONIC KIDNEY DISEASE, UNSPECIFIED CKD STAGE, UNSPECIFIED HEART FAILURE TYPE (HCC): ICD-10-CM

## 2025-02-14 DIAGNOSIS — I48.91 ATRIAL FIBRILLATION, UNSPECIFIED TYPE (HCC): ICD-10-CM

## 2025-02-14 PROCEDURE — 99214 OFFICE O/P EST MOD 30 MIN: CPT | Performed by: FAMILY MEDICINE

## 2025-02-14 PROCEDURE — G2211 COMPLEX E/M VISIT ADD ON: HCPCS | Performed by: FAMILY MEDICINE

## 2025-02-14 NOTE — ASSESSMENT & PLAN NOTE
Wt Readings from Last 3 Encounters:   02/14/25 44 kg (97 lb)   01/03/25 43.5 kg (96 lb)   11/21/24 43.5 kg (96 lb)   Patient is stable  and will continue present plan of care and reassess at next routine visit. All questions about this problem from patient were answered today.

## 2025-02-14 NOTE — PROGRESS NOTES
Name: Moriah Arreola      : 1940      MRN: [id number]  Encounter Provider: Jose R Yee MD  Encounter Date: 2025   Encounter department: [facility]  :  Assessment & Plan  Acute on chronic combined systolic (congestive) and diastolic (congestive) heart failure (HCC)  Wt Readings from Last 3 Encounters:   25 44 kg (97 lb)   25 43.5 kg (96 lb)   24 43.5 kg (96 lb)   Patient is stable  and will continue present plan of care and reassess at next routine visit. All questions about this problem from patient were answered today.              Atrial fibrillation, unspecified type (HCC)  Continue with anticogulation and rate control of heart rate. Concerns about condition were addressed today.        Hyperlipidemia, unspecified hyperlipidemia type  Patient  is stable with current medication and we discussed a low fat low cholesterol diet. Weight loss also discussed for this will help lower cholesterol also. Recheck lipids in 6 months.        Stage 3b chronic kidney disease (HCC)  Lab Results   Component Value Date    EGFR 47 2024    EGFR 44 2024    EGFR 40 2024    CREATININE 1.08 2024    CREATININE 1.15 2024    CREATININE 1.24 2024   Pt to avoid NSAIDs and any IV dyes. Patient to follow up eoither with nephrology or  with us for  further  monitoring of  renal function.        Hypertensive heart disease with congestive heart failure and chronic kidney disease, unspecified CKD stage, unspecified heart failure type (HCC)  Wt Readings from Last 3 Encounters:   25 44 kg (97 lb)   25 43.5 kg (96 lb)   24 43.5 kg (96 lb)   Pt to avoid NSAIDs and any IV dyes. Patient to follow up eoither with nephrology or  with us for  further  monitoring of  renal function.                      Falls Plan of Care: balance, strength, and gait training instructions were provided. Home safety education provided.     History of Present  "Illness   84-year-old female here today for checkup of multimedical problems.  Patient hypertension congestive heart failure atrial fibrillation gait dysfunction.  For blood sugar and blood pressure checkup and is doing very well with her medication her blood pressure is finally well-controlled and we will see her back in approximately 1 month for Medicare wellness visit and then we will go back to seeing her every 6 months because her blood pressure is stable.  She will be seeing her cardiologist in April.        Review of Systems   Constitutional:  Negative for activity change, appetite change, fatigue and fever.   HENT:  Negative for congestion, ear pain, postnasal drip, rhinorrhea, sinus pressure, sinus pain, sneezing and sore throat.    Eyes:  Negative for pain and redness.   Respiratory:  Negative for apnea, cough, chest tightness, shortness of breath and wheezing.    Cardiovascular:  Negative for chest pain, palpitations and leg swelling.   Gastrointestinal:  Negative for abdominal pain, constipation, diarrhea, nausea and vomiting.   Endocrine: Negative for cold intolerance and heat intolerance.   Genitourinary:  Negative for difficulty urinating, dysuria, frequency, hematuria and urgency.   Musculoskeletal:  Negative for arthralgias, back pain, gait problem and myalgias.   Skin:  Negative for rash.   Neurological:  Negative for dizziness, speech difficulty, weakness, numbness and headaches.   Hematological:  Does not bruise/bleed easily.   Psychiatric/Behavioral:  Negative for agitation, confusion and hallucinations.        Objective   /82 (BP Location: Left arm, Patient Position: Sitting, Cuff Size: Standard)   Pulse 71   Temp 98.4 °F (36.9 °C) (Skin)   Ht 4' 7\" (1.397 m)   Wt 44 kg (97 lb)   SpO2 97%   BMI 22.54 kg/m²      Physical Exam  Constitutional:       Appearance: Normal appearance. She is not ill-appearing.   HENT:      Head: Normocephalic and atraumatic.      Right Ear: Tympanic " membrane normal.      Left Ear: Tympanic membrane normal.      Nose: Nose normal.      Mouth/Throat:      Mouth: Mucous membranes are moist.   Eyes:      Extraocular Movements: Extraocular movements intact.      Conjunctiva/sclera: Conjunctivae normal.      Pupils: Pupils are equal, round, and reactive to light.   Cardiovascular:      Rate and Rhythm: Normal rate and regular rhythm.   Pulmonary:      Effort: Pulmonary effort is normal. No respiratory distress.      Breath sounds: Normal breath sounds. No wheezing.   Abdominal:      General: Bowel sounds are normal.      Palpations: Abdomen is soft.      Tenderness: There is no abdominal tenderness.   Musculoskeletal:         General: No tenderness. Normal range of motion.      Cervical back: Normal range of motion and neck supple.      Right lower leg: No edema.      Left lower leg: No edema.   Skin:     General: Skin is warm and dry.   Neurological:      Mental Status: She is alert and oriented to person, place, and time.   Psychiatric:         Mood and Affect: Mood normal.         Behavior: Behavior normal.         Thought Content: Thought content normal.         Judgment: Judgment normal.

## 2025-02-14 NOTE — ASSESSMENT & PLAN NOTE
Wt Readings from Last 3 Encounters:   02/14/25 44 kg (97 lb)   01/03/25 43.5 kg (96 lb)   11/21/24 43.5 kg (96 lb)   Pt to avoid NSAIDs and any IV dyes. Patient to follow up eoither with nephrology or  with us for  further  monitoring of  renal function.

## 2025-02-22 ENCOUNTER — RA CDI HCC (OUTPATIENT)
Dept: OTHER | Facility: HOSPITAL | Age: 85
End: 2025-02-22

## 2025-02-23 NOTE — PROGRESS NOTES
"HCC coding opportunities          Chart Reviewed number of suggestions sent to Provider: 2   I13.0  Patient is listed as being on insulin (not on the med list in her Snapshot) and apparently having diabetes, but per progress note of 3.6.24:  \"For some reason the patient had diabetes in her chart and this is in the air she does not have any diabetes.\"    Patients Insurance     Medicare Insurance: [employer] Advantage          "

## 2025-02-28 DIAGNOSIS — I50.41 ACUTE COMBINED SYSTOLIC AND DIASTOLIC CONGESTIVE HEART FAILURE (HCC): ICD-10-CM

## 2025-02-28 RX ORDER — SPIRONOLACTONE 25 MG/1
12.5 TABLET ORAL EVERY OTHER DAY
Qty: 23 TABLET | Refills: 0 | Status: SHIPPED | OUTPATIENT
Start: 2025-02-28 | End: 2026-08-22

## 2025-02-28 NOTE — TELEPHONE ENCOUNTER
Reason for call:   [x] Refill   [] Prior Auth  [] Other:     Office:   [] PCP/Provider -   [x] Specialty/Provider - Cardio     Medication: spironolactone (ALDACTONE) 25 mg tablet     Dose/Frequency:     12.5 mg, Oral, Every other day       Quantity: 23    Pharmacy: [id number] - [place] - [address] [place] [address]     Does the patient have enough for 3 days?   [x] Yes   [] No - Send as HP to POD

## 2025-03-07 ENCOUNTER — OFFICE VISIT (OUTPATIENT)
Dept: FAMILY MEDICINE CLINIC | Facility: CLINIC | Age: 85
End: 2025-03-07
Payer: COMMERCIAL

## 2025-03-07 VITALS
WEIGHT: 97 LBS | HEIGHT: 55 IN | RESPIRATION RATE: 14 BRPM | DIASTOLIC BLOOD PRESSURE: 82 MMHG | SYSTOLIC BLOOD PRESSURE: 128 MMHG | TEMPERATURE: 97.4 F | BODY MASS INDEX: 22.45 KG/M2 | HEART RATE: 72 BPM | OXYGEN SATURATION: 96 %

## 2025-03-07 DIAGNOSIS — N18.32 STAGE 3B CHRONIC KIDNEY DISEASE (HCC): ICD-10-CM

## 2025-03-07 DIAGNOSIS — I48.91 ATRIAL FIBRILLATION, UNSPECIFIED TYPE (HCC): ICD-10-CM

## 2025-03-07 DIAGNOSIS — I50.43 ACUTE ON CHRONIC COMBINED SYSTOLIC (CONGESTIVE) AND DIASTOLIC (CONGESTIVE) HEART FAILURE (HCC): Primary | ICD-10-CM

## 2025-03-07 DIAGNOSIS — E78.5 HYPERLIPIDEMIA, UNSPECIFIED HYPERLIPIDEMIA TYPE: ICD-10-CM

## 2025-03-07 PROCEDURE — G0439 PPPS, SUBSEQ VISIT: HCPCS | Performed by: FAMILY MEDICINE

## 2025-03-07 PROCEDURE — 99214 OFFICE O/P EST MOD 30 MIN: CPT | Performed by: FAMILY MEDICINE

## 2025-03-07 PROCEDURE — G2211 COMPLEX E/M VISIT ADD ON: HCPCS | Performed by: FAMILY MEDICINE

## 2025-03-07 NOTE — ASSESSMENT & PLAN NOTE
Wt Readings from Last 3 Encounters:   03/07/25 44 kg (97 lb)   02/14/25 44 kg (97 lb)   01/03/25 43.5 kg (96 lb)

## 2025-03-07 NOTE — ASSESSMENT & PLAN NOTE
Patient is stable  and will continue present plan of care and reassess at next routine visit. All questions about this problem from patient were answered today.

## 2025-03-07 NOTE — PROGRESS NOTES
Name: Moriah Arreola      : 1940      MRN: [id number]  Encounter Provider: Jose R Yee MD  Encounter Date: 3/7/2025   Encounter department: [facility]  :  Assessment & Plan  Acute on chronic combined systolic (congestive) and diastolic (congestive) heart failure (HCC)  Wt Readings from Last 3 Encounters:   25 44 kg (97 lb)   25 44 kg (97 lb)   25 43.5 kg (96 lb)   Patient is stable  and will continue present plan of care and reassess at next routine visit. All questions about this problem from patient were answered today.                Atrial fibrillation, unspecified type (HCC)  Patient is stable  and will continue present plan of care and reassess at next routine visit. All questions about this problem from patient were answered today.                                                                                                                                                                                                                                                                                                                                                                                                                                                                                                                                                                                                                                                                                                                                                                                                                                                                                                                                                                                                                                                                                                                                                                                                                                                                                                                                                                                                                                                                                                                                                                                                                                                                                                                                                                                                                                                                                                                                                                                                                                                                                                                                                                        Hyperlipidemia, unspecified hyperlipidemia type  Patient  is stable with current medication and we discussed a low fat low cholesterol diet. Weight loss also discussed for this will help lower cholesterol also. Recheck lipids in 6 months.        Stage 3b chronic kidney disease (HCC)  Lab Results   Component Value Date    EGFR 47 01/30/2024    EGFR 44 01/29/2024    EGFR 40 01/27/2024    CREATININE 1.08 01/30/2024    CREATININE 1.15 01/29/2024    CREATININE 1.24 01/27/2024   Pt to avoid NSAIDs and any IV dyes. Patient to follow up eoither with nephrology or  with us for  further  monitoring of  renal function.              Depression Screening and Follow-up Plan: Patient was screened for depression during today's encounter. They screened negative with a PHQ-2 score of 0.      Urinary Incontinence Plan of Care: counseling topics discussed: practice Kegel (pelvic floor strengthening) exercises, use restroom every 2 hours, limit alcohol, caffeine, spicy foods, and acidic foods, limiting fluid  "intake 3-4 hours before bed, weight loss, preventing constipation and limiting fluid intake to 60 oz. per day.     History of Present Illness   84-year-old female today for checkup on her Medicare wellness visit as well as multimedical problems history of hypertension CHF hyperlipidemia history of A-fib and is doing very well.  Patient's blood pressure was retaken and she is doing really good with that and we talked her about trying to decrease her salt intake.      Review of Systems    Objective   /82 (BP Location: Right arm, Patient Position: Sitting, Cuff Size: Standard)   Pulse 72   Temp (!) 97.4 °F (36.3 °C) (Temporal)   Resp 14   Ht 4' 7\" (1.397 m)   Wt 44 kg (97 lb)   SpO2 96%   BMI 22.54 kg/m²      Physical Exam    "

## 2025-03-07 NOTE — PROGRESS NOTES
Name: Moriah Arreola      : 1940      MRN: [id number]  Encounter Provider: Jose R Yee MD  Encounter Date: 3/7/2025   Encounter department: [facility]    Assessment & Plan  Acute on chronic combined systolic (congestive) and diastolic (congestive) heart failure (HCC)  Wt Readings from Last 3 Encounters:   25 44 kg (97 lb)   25 44 kg (97 lb)   25 43.5 kg (96 lb)                  Atrial fibrillation, unspecified type (HCC)         Hyperlipidemia, unspecified hyperlipidemia type         Stage 3b chronic kidney disease (HCC)  Lab Results   Component Value Date    EGFR 47 2024    EGFR 44 2024    EGFR 40 2024    CREATININE 1.08 2024    CREATININE 1.15 2024    CREATININE 1.24 2024             Preventive health issues were discussed with patient, and age appropriate screening tests were ordered as noted in patient's After Visit Summary. Personalized health advice and appropriate referrals for health education or preventive services given if needed, as noted in patient's After Visit Summary.    History of Present Illness     HPI   Patient Care Team:  Jose R Yee MD as PCP - General (Family Medicine)    Review of Systems  Medical History Reviewed by provider this encounter:       Annual Wellness Visit Questionnaire   Moriah is here for her Subsequent Wellness visit.     Health Risk Assessment:   Patient rates overall health as good. Patient feels that their physical health rating is same. Patient is satisfied with their life. Eyesight was rated as same. Hearing was rated as same. Patient feels that their emotional and mental health rating is same. Patients states they are never, rarely angry. Patient states they are never, rarely unusually tired/fatigued. Pain experienced in the last 7 days has been none. Patient states that she has experienced no weight loss or gain in last 6 months.     Depression Screening:   PHQ-2 Score:  0      Fall Risk Screening:   In the past year, patient has experienced: no history of falling in past year      Urinary Incontinence Screening:   Patient has not leaked urine accidently in the last six months.     Home Safety:  Patient does not have trouble with stairs inside or outside of their home. Patient has no working smoke alarms and has no working carbon monoxide detector. Home safety hazards include: none.     Nutrition:   Current diet is Regular.     Medications:   Patient is not currently taking any over-the-counter supplements. Patient is able to manage medications.     Activities of Daily Living (ADLs)/Instrumental Activities of Daily Living (IADLs):   Walk and transfer into and out of bed and chair?: Yes  Dress and groom yourself?: Yes    Bathe or shower yourself?: Yes    Feed yourself? Yes  Do your laundry/housekeeping?: Yes  Manage your money, pay your bills and track your expenses?: Yes  Make your own meals?: Yes    Do your own shopping?: Yes    Previous Hospitalizations:   Any hospitalizations or ED visits within the last 12 months?: No      Advance Care Planning:   Living will: No    Durable POA for healthcare: No    Advanced directive: No    Advanced directive counseling given: No    Five wishes given: No    Patient declined ACP directive: No      Cognitive Screening:   Provider or family/friend/caregiver concerned regarding cognition?: No    PREVENTIVE SCREENINGS      Cardiovascular Screening:    General: Screening Not Indicated and History Lipid Disorder      Cervical Cancer Screening:    General: Screening Not Indicated      Lung Cancer Screening:     General: Screening Not Indicated    Screening, Brief Intervention, and Referral to Treatment (SBIRT)     Screening  Typical number of drinks in a day: 0  Typical number of drinks in a week: 1  Interpretation: Low risk drinking behavior.    AUDIT-C Screenin) How often did you have a drink containing alcohol in the past year? 2 to 4 times a  month  2) How many drinks did you have on a typical day when you were drinking in the past year? 1 to 2  3) How often did you have 6 or more drinks on one occasion in the past year? weekly    AUDIT-C Score: 5  Interpretation: Score 3-12 (female): POSITIVE screen for alcohol misuse    AUDIT Screenin) How often during the last year have you found that you were not able to stop drinking once you had started? 0 - never  5) How often during the last year have you failed to do what was normally expected from you because of drinking? 0 - never  6) How often during the last year have you needed a first drink in the morning to get yourself going after a heavy drinking session? 0 - never  7) How often during the last year have you had a feeling of guilt or remorse after drinking? 0 - never  8) How often during the last year have you been unable to remember what happened the night before because you had been drinking? 0 - never  9) Have you or someone else been injured as a result of your drinking? 0 - no  10) Has a relative or friend or a doctor or another health worker been concerned about your drinking or suggested you cut down? 0 - no    AUDIT Score: 5  Interpretation: Low risk alcohol consumption    Single Item Drug Screening:  How often have you used an illegal drug (including marijuana) or a prescription medication for non-medical reasons in the past year? never    Single Item Drug Screen Score: 0  Interpretation: Negative screen for possible drug use disorder    Social Drivers of Health     Financial Resource Strain: Low Risk  (3/6/2024)    Overall Financial Resource Strain ([facility])    • Difficulty of Paying Living Expenses: Not hard at all   Food Insecurity: No Food Insecurity (3/7/2025)    Hunger Vital Sign    • Worried About Running Out of Food in the Last Year: Never true    • Ran Out of Food in the Last Year: Never true   Transportation Needs: No Transportation Needs (3/7/2025)    PRAPARE - Transportation     "• Lack of Transportation (Medical): No    • Lack of Transportation (Non-Medical): No   Housing Stability: Low Risk  (3/7/2025)    Housing Stability Vital Sign    • Unable to Pay for Housing in the Last Year: No    • Number of Times Moved in the Last Year: 0    • Homeless in the Last Year: No   Utilities: Not At Risk (3/7/2025)    [facility] Utilities    • Threatened with loss of utilities: No     No results found.    Objective   /82 (BP Location: Right arm, Patient Position: Sitting, Cuff Size: Standard)   Pulse 72   Temp (!) 97.4 °F (36.3 °C) (Temporal)   Resp 14   Ht 4' 7\" (1.397 m)   Wt 44 kg (97 lb)   SpO2 96%   BMI 22.54 kg/m²     Physical Exam    "

## 2025-03-07 NOTE — PATIENT INSTRUCTIONS
Medicare Preventive Visit Patient Instructions  Thank you for completing your Welcome to Medicare Visit or Medicare Annual Wellness Visit today. Your next wellness visit will be due in one year (3/8/2026).  The screening/preventive services that you may require over the next 5-10 years are detailed below. Some tests may not apply to you based off risk factors and/or age. Screening tests ordered at today's visit but not completed yet may show as past due. Also, please note that scanned in results may not display below.  Preventive Screenings:  Service Recommendations Previous Testing/Comments   Colorectal Cancer Screening  * Colonoscopy    * Fecal Occult Blood Test (FOBT)/Fecal Immunochemical Test (FIT)  * Fecal DNA/Cologuard Test  * Flexible Sigmoidoscopy Age: 45-75 years old   Colonoscopy: every 10 years (may be performed more frequently if at higher risk)  OR  FOBT/FIT: every 1 year  OR  Cologuard: every 3 years  OR  Sigmoidoscopy: every 5 years  Screening may be recommended earlier than age 45 if at higher risk for colorectal cancer. Also, an individualized decision between you and your healthcare provider will decide whether screening between the ages of 76-85 would be appropriate. Colonoscopy: Not on file  FOBT/FIT: Not on file  Cologuard: Not on file  Sigmoidoscopy: Not on file          Breast Cancer Screening Age: 40+ years old  Frequency: every 1-2 years  Not required if history of left and right mastectomy Mammogram: Not on file        Cervical Cancer Screening Between the ages of 21-29, pap smear recommended once every 3 years.   Between the ages of 30-65, can perform pap smear with HPV co-testing every 5 years.   Recommendations may differ for women with a history of total hysterectomy, cervical cancer, or abnormal pap smears in past. Pap Smear: Not on file    Screening Not Indicated   Hepatitis C Screening Once for adults born between 1945 and 1965  More frequently in patients at high risk for Hepatitis  C Hep C Antibody: Not on file        Diabetes Screening 1-2 times per year if you're at risk for diabetes or have pre-diabetes Fasting glucose: No results in last 5 years (No results in last 5 years)  A1C: No results in last 5 years (No results in last 5 years)      Cholesterol Screening Once every 5 years if you don't have a lipid disorder. May order more often based on risk factors. Lipid panel: 01/10/2023    Screening Not Indicated  History Lipid Disorder     Other Preventive Screenings Covered by Medicare:  Abdominal Aortic Aneurysm (AAA) Screening: covered once if your at risk. You're considered to be at risk if you have a family history of AAA.  Lung Cancer Screening: covers low dose CT scan once per year if you meet all of the following conditions: (1) Age 55-77; (2) No signs or symptoms of lung cancer; (3) Current smoker or have quit smoking within the last 15 years; (4) You have a tobacco smoking history of at least 20 pack years (packs per day multiplied by number of years you smoked); (5) You get a written order from a healthcare provider.  Glaucoma Screening: covered annually if you're considered high risk: (1) You have diabetes OR (2) Family history of glaucoma OR (3)  aged 50 and older OR (4)  American aged 65 and older  Osteoporosis Screening: covered every 2 years if you meet one of the following conditions: (1) You're estrogen deficient and at risk for osteoporosis based off medical history and other findings; (2) Have a vertebral abnormality; (3) On glucocorticoid therapy for more than 3 months; (4) Have primary hyperparathyroidism; (5) On osteoporosis medications and need to assess response to drug therapy.   Last bone density test (DXA Scan): Not on file.  HIV Screening: covered annually if you're between the age of 15-65. Also covered annually if you are younger than 15 and older than 65 with risk factors for HIV infection. For pregnant patients, it is covered up to 3  times per pregnancy.    Immunizations:  Immunization Recommendations   Influenza Vaccine Annual influenza vaccination during flu season is recommended for all persons aged >= 6 months who do not have contraindications   Pneumococcal Vaccine   * Pneumococcal conjugate vaccine = PCV13 (Prevnar 13), PCV15 (Vaxneuvance), PCV20 (Prevnar 20)  * Pneumococcal polysaccharide vaccine = PPSV23 (Pneumovax) Adults 19-63 yo with certain risk factors or if 65+ yo  If never received any pneumonia vaccine: recommend Prevnar 20 (PCV20)  Give PCV20 if previously received 1 dose of PCV13 or PPSV23   Hepatitis B Vaccine 3 dose series if at intermediate or high risk (ex: diabetes, end stage renal disease, liver disease)   Respiratory syncytial virus (RSV) Vaccine - COVERED BY MEDICARE PART D  * RSVPreF3 (Arexvy) CDC recommends that adults 60 years of age and older may receive a single dose of RSV vaccine using shared clinical decision-making (SCDM)   Tetanus (Td) Vaccine - COST NOT COVERED BY MEDICARE PART B Following completion of primary series, a booster dose should be given every 10 years to maintain immunity against tetanus. Td may also be given as tetanus wound prophylaxis.   Tdap Vaccine - COST NOT COVERED BY MEDICARE PART B Recommended at least once for all adults. For pregnant patients, recommended with each pregnancy.   Shingles Vaccine (Shingrix) - COST NOT COVERED BY MEDICARE PART B  2 shot series recommended in those 19 years and older who have or will have weakened immune systems or those 50 years and older     Health Maintenance Due:  There are no preventive care reminders to display for this patient.  Immunizations Due:      Topic Date Due   • Influenza Vaccine (1) 09/01/2024   • COVID-19 Vaccine (5 - 2024-25 season) 09/01/2024     Advance Directives   What are advance directives?  Advance directives are legal documents that state your wishes and plans for medical care. These plans are made ahead of time in case you lose  your ability to make decisions for yourself. Advance directives can apply to any medical decision, such as the treatments you want, and if you want to donate organs.   What are the types of advance directives?  There are many types of advance directives, and each state has rules about how to use them. You may choose a combination of any of the following:  Living will:  This is a written record of the treatment you want. You can also choose which treatments you do not want, which to limit, and which to stop at a certain time. This includes surgery, medicine, IV fluid, and tube feedings.   Durable power of  for healthcare (DPAHC):  This is a written record that states who you want to make healthcare choices for you when you are unable to make them for yourself. This person, called a proxy, is usually a family member or a friend. You may choose more than 1 proxy.  Do not resuscitate (DNR) order:  A DNR order is used in case your heart stops beating or you stop breathing. It is a request not to have certain forms of treatment, such as CPR. A DNR order may be included in other types of advance directives.  Medical directive:  This covers the care that you want if you are in a coma, near death, or unable to make decisions for yourself. You can list the treatments you want for each condition. Treatment may include pain medicine, surgery, blood transfusions, dialysis, IV or tube feedings, and a ventilator (breathing machine).  Values history:  This document has questions about your views, beliefs, and how you feel and think about life. This information can help others choose the care that you would choose.  Why are advance directives important?  An advance directive helps you control your care. Although spoken wishes may be used, it is better to have your wishes written down. Spoken wishes can be misunderstood, or not followed. Treatments may be given even if you do not want them. An advance directive may make it  "easier for your family to make difficult choices about your care.   Alcohol Use and Your Health    Drinking too much can harm your health.  Excessive alcohol use leads to about 88,000 death in the [place] each year, and shortens the life of those who diet by almost 30 years.  Further, excessive drinking cost the economy $249 billion in 2010.  Most excessive drinkers are not alcohol dependent.    Excessive alcohol use has immediate effects that increase the risk of many harmful health conditions.  These are most often the result of binge drinking.  Over time, excessive alcohol use can lead to the development of chronic diseases and other series health problems.    What is considered a \"drink\"?        Excessive alcohol use includes:  Binge Drinking: For women, 4 or more drinks consumed on one occasion. For men, 5 or more drinks consumed on one occasion.  Heavy Drinking: For women, 8 or more drinks per week. For men, 15 or more drinks per week  Any alcohol used by pregnant women  Any alcohol used by those under the age of 21 years    If you choose to drink, do so in moderation:  Do not drink at all if you are under the age of 21, or if you are or may be pregnant, or have health problems that could be made worse by drinking.  For women, up to 1 drink per day  For men, up to 2 drinks a day    No one should begin drinking or drink more frequently based on potential health benefits    Short-Term Health Risks:  Injuries: motor vehicle crashes, falls, drownings, burns  Violence: homicide, suicide, sexual assault, intimate partner violence  Alcohol poisoning  Reproductive health: risky sexual behaviors, unintended prengnacy, sexually transmitted diseases, miscarriage, stillbirth, fetal alcohol syndrome    Long-Term Health Risks:  Chronic diseases: high blood pressure, heart disease, stroke, liver disease, digestive problems  Cancers: breast, mouth and throat, liver, colon  Learning and memory problems: dementia, poor " school performance  Mental health: depression, anxiety, insomnia  Social problems: lost productivity, family problems, unemployment  Alcohol dependence    For support and more information:  Substance Abuse and Mental Health Services Administration  [address]  [place] [address]  Web Address: http://www.[facility] Hospitala.gov    Alcoholics Anonymous        Web Address: http://www.aa.org    https://www.cdc.gov/alcohol/fact-sheets/alcohol-use.htm     © Copyright [employer] 2018 Information is for End User's use only and may not be sold, redistributed or otherwise used for commercial purposes. All illustrations and images included in CareNotes® are the copyrighted property of [employer]M., Inc. or [employer]

## 2025-03-07 NOTE — ASSESSMENT & PLAN NOTE
Wt Readings from Last 3 Encounters:   03/07/25 44 kg (97 lb)   02/14/25 44 kg (97 lb)   01/03/25 43.5 kg (96 lb)   Patient is stable  and will continue present plan of care and reassess at next routine visit. All questions about this problem from patient were answered today.

## 2025-03-07 NOTE — ASSESSMENT & PLAN NOTE
Lab Results   Component Value Date    EGFR 47 01/30/2024    EGFR 44 01/29/2024    EGFR 40 01/27/2024    CREATININE 1.08 01/30/2024    CREATININE 1.15 01/29/2024    CREATININE 1.24 01/27/2024

## 2025-03-13 ENCOUNTER — RESULTS FOLLOW-UP (OUTPATIENT)
Dept: CARDIOLOGY CLINIC | Facility: CLINIC | Age: 85
End: 2025-03-13

## 2025-03-13 LAB
ALBUMIN SERPL-MCNC: 4.4 G/DL (ref 3.6–5.1)
ALBUMIN/GLOB SERPL: 1.3 (CALC) (ref 1–2.5)
ALP SERPL-CCNC: 100 U/L (ref 37–153)
ALT SERPL-CCNC: 7 U/L (ref 6–29)
AST SERPL-CCNC: 15 U/L (ref 10–35)
BASOPHILS # BLD AUTO: 51 CELLS/UL (ref 0–200)
BASOPHILS NFR BLD AUTO: 1 %
BILIRUB SERPL-MCNC: 1.6 MG/DL (ref 0.2–1.2)
BUN SERPL-MCNC: 24 MG/DL (ref 7–25)
BUN/CREAT SERPL: 20 (CALC) (ref 6–22)
CALCIUM SERPL-MCNC: 9.4 MG/DL (ref 8.6–10.4)
CHLORIDE SERPL-SCNC: 100 MMOL/L (ref 98–110)
CHOLEST SERPL-MCNC: 126 MG/DL
CHOLEST/HDLC SERPL: 4.7 (CALC)
CO2 SERPL-SCNC: 28 MMOL/L (ref 20–32)
CREAT SERPL-MCNC: 1.19 MG/DL (ref 0.6–0.95)
EOSINOPHIL # BLD AUTO: 143 CELLS/UL (ref 15–500)
EOSINOPHIL NFR BLD AUTO: 2.8 %
ERYTHROCYTE [DISTWIDTH] IN BLOOD BY AUTOMATED COUNT: 12.7 % (ref 11–15)
GFR/BSA.PRED SERPLBLD CYS-BASED-ARV: 45 ML/MIN/1.73M2
GLOBULIN SER CALC-MCNC: 3.3 G/DL (CALC) (ref 1.9–3.7)
GLUCOSE SERPL-MCNC: 100 MG/DL (ref 65–99)
HCT VFR BLD AUTO: 44.3 % (ref 35–45)
HDLC SERPL-MCNC: 27 MG/DL
HGB BLD-MCNC: 14.6 G/DL (ref 11.7–15.5)
LDLC SERPL CALC-MCNC: 78 MG/DL (CALC)
LYMPHOCYTES # BLD AUTO: 1306 CELLS/UL (ref 850–3900)
LYMPHOCYTES NFR BLD AUTO: 25.6 %
MCH RBC QN AUTO: 30.4 PG (ref 27–33)
MCHC RBC AUTO-ENTMCNC: 33 G/DL (ref 32–36)
MCV RBC AUTO: 92.1 FL (ref 80–100)
MONOCYTES # BLD AUTO: 520 CELLS/UL (ref 200–950)
MONOCYTES NFR BLD AUTO: 10.2 %
NEUTROPHILS # BLD AUTO: 3080 CELLS/UL (ref 1500–7800)
NEUTROPHILS NFR BLD AUTO: 60.4 %
NONHDLC SERPL-MCNC: 99 MG/DL (CALC)
PLATELET # BLD AUTO: 288 THOUSAND/UL (ref 140–400)
PMV BLD REES-ECKER: 10.5 FL (ref 7.5–12.5)
POTASSIUM SERPL-SCNC: 4.6 MMOL/L (ref 3.5–5.3)
PROT SERPL-MCNC: 7.7 G/DL (ref 6.1–8.1)
RBC # BLD AUTO: 4.81 MILLION/UL (ref 3.8–5.1)
SODIUM SERPL-SCNC: 137 MMOL/L (ref 135–146)
TRIGL SERPL-MCNC: 130 MG/DL
TSH SERPL-ACNC: 2.94 MIU/L (ref 0.4–4.5)
WBC # BLD AUTO: 5.1 THOUSAND/UL (ref 3.8–10.8)

## 2025-03-13 NOTE — TELEPHONE ENCOUNTER
----- Message from Clau Vargas MD sent at 3/13/2025  9:03 AM EDT -----  Patient blood test reviewed.  They are acceptable.  Will continue same medication.  Patient should keep his appointment for follow-up.

## 2025-03-21 DIAGNOSIS — I50.41 ACUTE COMBINED SYSTOLIC AND DIASTOLIC CONGESTIVE HEART FAILURE (HCC): ICD-10-CM

## 2025-03-21 DIAGNOSIS — I48.91 ATRIAL FIBRILLATION WITH RVR (HCC): ICD-10-CM

## 2025-03-21 NOTE — TELEPHONE ENCOUNTER
Reason for call:   [x] Refill   [] Prior Auth  [] Other:     Office:   [] PCP/Provider -   [x] Specialty/Provider - Cardiology/ MD Sam    Medication: digoxin (LANOXIN) 0.125 mg tablet     Dose/Frequency: Take 1 tablet (125 mcg total) by mouth 4 (four) times a week Advised patient to take 5 times weekly    Quantity: 48    Medication: furosemide (LASIX) 20 mg tablet     Dose/Frequency: take 1 tablet by mouth every other day    Quantity: 15    Pharmacy: [id number] [place] [address] [phone]    Local Pharmacy   Does the patient have enough for 3 days?   [] Yes   [x] No - Send as HP to POD    Mail Away Pharmacy   Does the patient have enough for 10 days?   [] Yes   [] No - Send as HP to POD

## 2025-03-22 RX ORDER — DIGOXIN 125 MCG
125 TABLET ORAL
Qty: 48 TABLET | Refills: 0 | Status: SHIPPED | OUTPATIENT
Start: 2025-03-22

## 2025-03-22 RX ORDER — FUROSEMIDE 20 MG/1
20 TABLET ORAL EVERY OTHER DAY
Qty: 15 TABLET | Refills: 5 | Status: SHIPPED | OUTPATIENT
Start: 2025-03-22

## 2025-03-27 NOTE — TELEPHONE ENCOUNTER
Carlee called to check status - advised orders sent to [facility] 3.22.25  Labs needed, she stated labs were just done

## 2025-04-02 ENCOUNTER — NURSE TRIAGE (OUTPATIENT)
Age: 85
End: 2025-04-02

## 2025-04-02 DIAGNOSIS — I48.91 ATRIAL FIBRILLATION WITH RVR (HCC): ICD-10-CM

## 2025-04-02 RX ORDER — DIGOXIN 125 MCG
125 TABLET ORAL
Qty: 48 TABLET | Refills: 3 | Status: SHIPPED | OUTPATIENT
Start: 2025-04-03

## 2025-04-02 NOTE — TELEPHONE ENCOUNTER
Pls advise, is the digoxin to be 4x weekly or 5x weekly. The script says both and the pharmacy needs clarification. Thank you

## 2025-04-02 NOTE — TELEPHONE ENCOUNTER
"Reason for Disposition   Caller has NON-URGENT medicine question about med that PCP or specialist prescribed and triager unable to answer question    Answer Assessment - Initial Assessment Questions  1. NAME of MEDICINE: \"What medicine(s) are you calling about?\"      Digoxin   2. QUESTION: \"What is your question?\" (e.g., double dose of medicine, side effect)      Received a call from [facility] pharmacist  Received digoxin script but there is 2 different instructions     Please resent script with only one set of instructions. Please refax    Please advise    Protocols used: Medication Question Call-Adult-[place]    "

## 2025-04-02 NOTE — TELEPHONE ENCOUNTER
I tried calling in verbal they would not allow , asked that you correct script with those directions

## 2025-04-04 DIAGNOSIS — I50.42 HYPERTENSIVE HEART DISEASE WITH CHRONIC COMBINED SYSTOLIC AND DIASTOLIC CONGESTIVE HEART FAILURE (HCC): ICD-10-CM

## 2025-04-04 DIAGNOSIS — I11.0 HYPERTENSIVE HEART DISEASE WITH CHRONIC COMBINED SYSTOLIC AND DIASTOLIC CONGESTIVE HEART FAILURE (HCC): ICD-10-CM

## 2025-04-04 RX ORDER — AMLODIPINE BESYLATE 5 MG/1
5 TABLET ORAL DAILY
Qty: 90 TABLET | Refills: 1 | Status: SHIPPED | OUTPATIENT
Start: 2025-04-04

## 2025-04-04 NOTE — TELEPHONE ENCOUNTER
Pts friend Carlee is requesting Cardio take over    Reason for call:   [x] Refill   [] Prior Auth  [] Other:     Office:   [] PCP/Provider -   [x] Specialty/Provider - [facility] / Clau Vargas MD    Medication:   ~ amLODIPine (NORVASC) 5 mg tablet - Take 1 tablet (5 mg total) by mouth daily     Pharmacy:   [id number] [place] - [address] [place] [address] Pharmacy   Does the patient have enough for 3 days?   [x] Yes   [] No - Send as HP to POD

## 2025-04-30 ENCOUNTER — OFFICE VISIT (OUTPATIENT)
Dept: CARDIOLOGY CLINIC | Facility: CLINIC | Age: 85
End: 2025-04-30
Payer: COMMERCIAL

## 2025-04-30 VITALS
OXYGEN SATURATION: 97 % | DIASTOLIC BLOOD PRESSURE: 70 MMHG | WEIGHT: 96.4 LBS | HEART RATE: 69 BPM | BODY MASS INDEX: 22.31 KG/M2 | SYSTOLIC BLOOD PRESSURE: 120 MMHG | HEIGHT: 55 IN

## 2025-04-30 DIAGNOSIS — I11.0 HYPERTENSIVE HEART DISEASE WITH CHRONIC COMBINED SYSTOLIC AND DIASTOLIC CONGESTIVE HEART FAILURE (HCC): ICD-10-CM

## 2025-04-30 DIAGNOSIS — I50.42 HYPERTENSIVE HEART DISEASE WITH CHRONIC COMBINED SYSTOLIC AND DIASTOLIC CONGESTIVE HEART FAILURE (HCC): ICD-10-CM

## 2025-04-30 DIAGNOSIS — N18.32 STAGE 3B CHRONIC KIDNEY DISEASE (HCC): ICD-10-CM

## 2025-04-30 DIAGNOSIS — I50.42 CHRONIC COMBINED SYSTOLIC AND DIASTOLIC CONGESTIVE HEART FAILURE (HCC): ICD-10-CM

## 2025-04-30 DIAGNOSIS — E78.5 DYSLIPIDEMIA: ICD-10-CM

## 2025-04-30 DIAGNOSIS — I48.20 CHRONIC ATRIAL FIBRILLATION (HCC): ICD-10-CM

## 2025-04-30 DIAGNOSIS — R06.09 DYSPNEA ON EXERTION: ICD-10-CM

## 2025-04-30 DIAGNOSIS — E87.1 HYPONATREMIA: ICD-10-CM

## 2025-04-30 PROCEDURE — 93000 ELECTROCARDIOGRAM COMPLETE: CPT | Performed by: INTERNAL MEDICINE

## 2025-04-30 PROCEDURE — 99214 OFFICE O/P EST MOD 30 MIN: CPT | Performed by: INTERNAL MEDICINE

## 2025-04-30 NOTE — PROGRESS NOTES
Progress Note - Cardiology Office  [facility]    Moriah Arreola 85 y.o. female MRN: [id number]  : 1940  Encounter: [id number]      Assessment:     1. Dyspnea on exertion    2. Chronic atrial fibrillation (HCC)    3. Chronic combined systolic and diastolic congestive heart failure (HCC)    4. Hypertensive heart disease with chronic combined systolic and diastolic congestive heart failure (HCC)    5. Dyslipidemia    6. Stage 3b chronic kidney disease (HCC)    7. Hyponatremia        Discussion Summary and Plan:    1.  Atrial fibrillation.  Her heart rate is now much better she takes metoprolol 100 mg 3 times daily and digoxin 4 times weekly.  Occasionally she will take 5 times a week.  Heart rate is 70 bpm average heart rate on the extended monitoring was acceptable    2. Chronic systolic and diastolic heart failure.  She is known to have cardiomyopathy her EF is around 30%.  Recent echo shows her EF is improved to 40%.  There is moderate mitral regurgitation and moderate TR. currently she has no evidence of any volume overload.  She takes Lasix alternate with spironolactone that has helped her tremendously.  She is also on Farxiga 5 mg daily.    3. Exertional shortness of breath.  He has chronic shortness of breath.  Which is not changed she has bilateral coarse crackles but no evidence of volume overload.  Continue monitoring.  Labs reviewed.    4. Dyslipidemia.  Continue statins profile has been acceptable.  Does have low HDL.    5. CRI.  She is on Xarelto 15 mg daily as her weight is only 45 kg.  She is also taking Lasix 20 mg every other day along with Aldactone 12.5 every other day and she is on heart failure medication with metoprolol and losartan.  She came with her friend.  Will continue Xarelto same dose.    6.  Labile hypertension.  Blood pressure is labile partially driven by her anxiety.  We will continue current Rx she amlodipine 5 mg daily losartan 100 mg daily Lasix 20  mg every other day, Aldactone 12.5 every other day and Farxiga 5 mg daily.    Plan.    Currently she is doing well on this medication continue same Rx and follow-up in 7 to 8 months.      Follow-up 6 months.      Patient  And her friend was advised and educated to call our office  immediately if  patient has any new symptoms of chest pain/shortness of breath, near-syncope, syncope, light headedness sustained palpitations  or any other cardiovascular symptoms before their scheduled follow-up appointment.  Office #[phone].  Please call [phone] if any questions.  Counseling :  A description of the counseling.  Goals and Barriers.  Patient's ability to self care: Yes  Medication side effect reviewed with patient in detail and all their questions answered to their satisfaction.    HPI :     Moriah Arreola is a 85 y.o. year old female who came for follow up. Patient was in size recently admitted to [facility] with shortness of breath and was known to have chronic systolic and diastolic heart failure and chronic atrial fibrillation.  She also has CRI.  She used to follow with [facility] and has history of cardiomyopathy difficult to control rate.  Her rate was better with beta-blockers and digoxin however she was not taking metoprolol 50 twice a day instead of 100 twice a day.  She is also on heart failure medications.  She came with her friend.  Otherwise she is doing well leg edema has improved and her shortness of breath is much better.  Echo Doppler shows her EF is around 30%.        3/20/2024.    Above reviewed.  Patient came for follow-up she is feeling well heart rate has improved.  She had history of chronic systolic and diastolic heart failure, chronic atrial fibrillation, dyslipidemia, anxiety, labile hypertension who came for follow-up.  Recently her blood pressure was high and her losartan was increased to 100 mg daily.  She feels better today EKG reviewed with them.   Her last blood test from January 2024 shows sodium was 132 electrolytes were stable hemoglobin is better.  Rest of profile was done in January 23 acceptable.  Currently she is taking Lipitor 10 mg daily losartan 100 mg daily, Toprol- mg twice a day, Aldactone 12.5 mg every other day digoxin 120 mg 4 times weekly Xarelto 15 mg daily Lasix 20 mg every other day and additional dose of metoprolol XL 25 mg daily.    9/20/2024.    Above reviewed.  Patient came for follow-up.  Her EF is improved to 40%.  History of chronic systolic and diastolic heart failure, chronic atrial fibrillation, dyslipidemia, labile hypertension, anxiety who came for follow-up.  Her echo from 2024 reviewed her EF is improved to 40%.  She has better rate control now her current list of medications reviewed.  She is on losartan 100 mg daily, Metropol  twice a day plus additional 25 mg 3 times daily, Aldactone 12.5 mg every other day and digoxin 0.125 tablet 4 times weekly.  She is on Xarelto 15 mg daily in the evening for antithrombotic therapy.  She has history of chronic hyponatremia his last sodium was 132 fluid restriction recommended.  Her BUN is 32 and other electrolytes are stable.  Her last blood test was in January 2024.  She otherwise feels well.  She does get easily anxious but her blood pressure was high when checked by me.  Heart rate is 106 bpm.  She otherwise had no new complaints.  Her labs reviewed.    4/30/2025.    Above.  Reviewed.  Patient came for follow-up.  Echo from April 2024 shows her EF has improved to 40%.  Ambulatory monitoring done in October 2024 shows average heart rate 71 bpm though she has Billetz of A-fib with rapid ventricular rate.  Her current medication list reviewed.  She is on Toprol- mg x 3. Aldactone 25 5 every other day and digoxin 0.125 tablet 4 times weekly and Xarelto 15 mg daily.  She had a history of hypertensive heart disease, hyponatremia, dyslipidemia and anxiety.  Her last blood  test from 3/12/2025 reviewed.  Sodium was 137.  LDH was low.  Other labs were acceptable.  She has chronic shortness of breath which is not changed.  She does have some ambulatory dysfunction.    Review of Systems   Constitutional:  Negative for activity change, chills, diaphoresis, fever and unexpected weight change.   HENT:  Negative for congestion.    Eyes:  Negative for discharge and redness.   Respiratory:  Positive for shortness of breath. Negative for cough, chest tightness and wheezing.         Chronic exertional not changed.   Cardiovascular: Negative.  Negative for chest pain, palpitations and leg swelling.   Gastrointestinal:  Negative for abdominal pain, diarrhea and nausea.   Endocrine: Negative.    Genitourinary:  Negative for decreased urine volume and urgency.   Musculoskeletal:  Positive for arthralgias and gait problem. Negative for back pain.   Skin:  Negative for rash and wound.   Allergic/Immunologic: Negative.    Neurological:  Negative for dizziness, seizures, syncope, weakness, light-headedness and headaches.   Hematological: Negative.    Psychiatric/Behavioral:  Negative for agitation and confusion. The patient is nervous/anxious.        Historical Information   Past Medical History:   Diagnosis Date   • JUANITA (acute kidney injury) (HCC) 2022   • Atrial fibrillation (HCC)    • Fall 2024   • Hypercholesteremia      Past Surgical History:   Procedure Laterality Date   • EMBOLIZATION     • HERNIA REPAIR       Social History     Substance and Sexual Activity   Alcohol Use Yes   • Alcohol/week: 7.0 standard drinks of alcohol   • Types: 7 Standard drinks or equivalent per week    Comment: one glass wine once a week     Social History     Substance and Sexual Activity   Drug Use Not Currently     Social History     Tobacco Use   Smoking Status Former   • Current packs/day: 0.00   • Types: Cigarettes   • Quit date:    • Years since quittin.3   • Passive exposure: Past   Smokeless  "Tobacco Never     Family History: History reviewed. No pertinent family history.    Meds/Allergies     No Known Allergies    Current Outpatient Medications:   •  amLODIPine (NORVASC) 5 mg tablet, Take 1 tablet (5 mg total) by mouth daily, Disp: 90 tablet, Rfl: 1  •  atorvastatin (LIPITOR) 10 mg tablet, Take 1 tablet (10 mg total) by mouth daily, Disp: 90 tablet, Rfl: 0  •  dapagliflozin (Farxiga) 5 MG TABS, Take 1 tablet (5 mg total) by mouth daily, Disp: 90 tablet, Rfl: 1  •  digoxin (LANOXIN) 0.125 mg tablet, Take 1 tablet (125 mcg total) by mouth 4 (four) times a week (Patient taking differently: Take 125 mcg by mouth 5 x a week), Disp: 48 tablet, Rfl: 3  •  furosemide (LASIX) 20 mg tablet, Take 1 tablet (20 mg total) by mouth every other day, Disp: 15 tablet, Rfl: 5  •  losartan (COZAAR) 100 MG tablet, Take 1 tablet (100 mg total) by mouth daily, Disp: 90 tablet, Rfl: 1  •  metoprolol succinate (TOPROL-XL) 100 mg 24 hr tablet, Take 1 tablet (100 mg total) by mouth 3 (three) times a day, Disp: 270 tablet, Rfl: 1  •  rivaroxaban (Xarelto) 15 mg tablet, Take 1 tablet (15 mg total) by mouth every evening, Disp: 90 tablet, Rfl: 1  •  spironolactone (ALDACTONE) 25 mg tablet, Take 0.5 tablets (12.5 mg total) by mouth every other day, Disp: 23 tablet, Rfl: 0    Vitals: Blood pressure 120/70, pulse 69, height 4' 7\" (1.397 m), weight 43.7 kg (96 lb 6.4 oz), SpO2 97%.    Body mass index is 22.41 kg/m².  Wt Readings from Last 3 Encounters:   04/30/25 43.7 kg (96 lb 6.4 oz)   03/07/25 44 kg (97 lb)   02/14/25 44 kg (97 lb)     Vitals:    04/30/25 1125   Weight: 43.7 kg (96 lb 6.4 oz)         BP Readings from Last 3 Encounters:   04/30/25 120/70   03/07/25 128/82   02/14/25 130/82         Physical Exam  Constitutional:       General: She is not in acute distress.     Appearance: She is well-developed. She is not diaphoretic.   Neck:      Thyroid: No thyromegaly.      Vascular: No JVD.   Cardiovascular:      Rate and Rhythm: " Normal rate. Rhythm irregularly irregular.      Heart sounds: S1 normal and S2 normal. Heart sounds not distant. Murmur heard.      Systolic murmur is present.      No friction rub. No gallop. No S3 or S4 sounds.   Pulmonary:      Effort: Pulmonary effort is normal. No respiratory distress.      Breath sounds: No wheezing or rales.      Comments: Bilateral air entry with coarse crackles not changed.  Chest:      Chest wall: No tenderness.   Abdominal:      General: There is no distension.      Palpations: Abdomen is soft.      Tenderness: There is no abdominal tenderness.   Musculoskeletal:         General: No deformity.      Cervical back: Neck supple.   Lymphadenopathy:      Cervical: No cervical adenopathy.   Skin:     General: Skin is warm and dry.      Coloration: Skin is not pale.      Findings: No rash.   Neurological:      Mental Status: She is alert and oriented to person, place, and time.   Psychiatric:         Judgment: Judgment normal.             Diagnostic Studies Review Cardio:  Echo Doppler reviewed.    To monitor.  Holter monitor done 12/6/2022 shows average heart rate 89 bpm.  PACs and PVCs noted no other pauses.    Ambulatory monitoring done in October 2024 shows average heart rate 73 bpm no significant pauses few PACs and PVCs noted.    EKG:  Twelve lead EKG 08/26/2022 shows with heart rate 115 beats per minute rare PVC noted    Twelve lead EKG done on 11/30/2022 shows atrial fibrillation with heart rate 120 beats per minute.  As compared to previous EKG no changes heart remains elevated    Twelve-lead EKG done on 2/9/2023 shows atrial fibrillation with heart rate ranging from 90 - 120 bpm.    Twelve-lead EKG done 8/25/2020 shows atrial fibrillation heart rate 98 bpm.  Heart rate better than before.    Twelve-lead EKG done 3/20/2024 shows atrial fibrillation heart rate 85 bpm as compared to previous EKGs heart rate is now better.    Twelve-lead EKG done on 9/20/2024 shows atrial fibrillation with  heart rate 106 bpm.  As compared to previous EKG heart rate slightly faster.    Twelve-lead EKG done 4/30/2025 atrial fibrillation heart rate 69 bpm nonspecific ST changes as compared to previous EKGs heart rate is now better.    XR chest 1 view portable    Result Date: 8/14/2022  Narrative: CHEST INDICATION:   swelling. COMPARISON:  Chest radiograph from 11/6/2015. EXAM PERFORMED/VIEWS:  XR CHEST PORTABLE FINDINGS: Cardiomediastinal silhouette appears unremarkable. The lungs are clear.  No pneumothorax or pleural effusion. Osseous structures appear within normal limits for patient age.     Impression: No acute cardiopulmonary disease. Workstation performed: [id number]     Echo complete w/ contrast if indicated    Result Date: 8/15/2022  Narrative: •  Left Ventricle: Left ventricular cavity size is normal. Wall thickness is normal. Systolic function is severely reduced.  Estimated ejection fraction is 25-30%. Wall motion cannot be accurately assessed. Diastolic dysfunction, grade indeterminate due to underlying atrial fibrillation. •  Right Ventricle: Systolic function is reduced. •  Left Atrium: The atrium is severely dilated. •  Right Atrium: The atrium is moderately dilated. •  Mitral Valve: There is annular calcification. There is mild regurgitation. •  Tricuspid Valve: There is mild regurgitation. The right ventricular systolic pressure is mildly elevated.         Lab Review   Lab Results   Component Value Date    WBC 5.1 03/12/2025    HGB 14.6 03/12/2025    HCT 44.3 03/12/2025    MCV 92.1 03/12/2025    RDW 12.7 03/12/2025     03/12/2025     BMP:  Lab Results   Component Value Date    SODIUM 137 03/12/2025    K 4.6 03/12/2025     03/12/2025    CO2 28 03/12/2025    ANIONGAP 11.7 11/07/2015    BUN 24 03/12/2025    CREATININE 1.19 (H) 03/12/2025    GLUC 100 (H) 03/12/2025    CALCIUM 9.4 03/12/2025    CORRECTEDCA 9.3 08/15/2022    EGFR 45 (L) 03/12/2025    MG 2.2 01/27/2024     Troponins:    LFT:  Lab  "Results   Component Value Date    AST 15 03/12/2025    ALT 7 03/12/2025    ALKPHOS 100 03/12/2025    TP 7.7 03/12/2025    ALB 4.4 03/12/2025        Lab Results   Component Value Date    HGBA1C 6.0 (H) 04/27/2016     Lipid Profile:   Lab Results   Component Value Date    CHOLESTEROL 126 03/12/2025    HDL 27 (L) 03/12/2025    LDLCALC 78 03/12/2025    TRIG 130 03/12/2025     Lab Results   Component Value Date    CHOLESTEROL 126 03/12/2025    CHOLESTEROL 136 01/10/2023     Lab Results   Component Value Date    TROPONINI <0.02 11/06/2015     Lab Results   Component Value Date    NTBNP 2,787 (H) 08/14/2022          Dr. Clau Vargas MD [facility]      \"This note has been constructed using a voice recognition system.Therefore there may be syntax, spelling, and/or grammatical errors. Please call if you have any questions. \"  "

## 2025-05-01 DIAGNOSIS — I50.41 ACUTE COMBINED SYSTOLIC AND DIASTOLIC CONGESTIVE HEART FAILURE (HCC): ICD-10-CM

## 2025-05-01 NOTE — TELEPHONE ENCOUNTER
Reason for call:   [x] Refill   [] Prior Auth  [] Other:     Office: [facility]  [x] PCP/Provider - Jose R Yee   [] Specialty/Provider -     Medication: losartan     Dose/Frequency: 100 mg/ daily     Quantity: 90 day supply     Pharmacy: [facility] in [facility]   Does the patient have enough for 3 days?   [x] Yes   [] No - Send as HP to POD    Mail Away Pharmacy   Does the patient have enough for 10 days?   [] Yes   [] No - Send as HP to POD

## 2025-05-02 RX ORDER — LOSARTAN POTASSIUM 100 MG/1
100 TABLET ORAL DAILY
Qty: 90 TABLET | Refills: 1 | Status: SHIPPED | OUTPATIENT
Start: 2025-05-02

## 2025-05-08 DIAGNOSIS — E78.5 HYPERLIPIDEMIA, UNSPECIFIED HYPERLIPIDEMIA TYPE: ICD-10-CM

## 2025-05-08 DIAGNOSIS — I50.41 ACUTE COMBINED SYSTOLIC AND DIASTOLIC CONGESTIVE HEART FAILURE (HCC): ICD-10-CM

## 2025-05-08 DIAGNOSIS — I50.42 CHRONIC COMBINED SYSTOLIC AND DIASTOLIC CONGESTIVE HEART FAILURE (HCC): ICD-10-CM

## 2025-05-08 NOTE — TELEPHONE ENCOUNTER
Reason for call:   [x] Refill   [] Prior Auth  [] Other:     Office:   [x] PCP/Provider -   [] Specialty/Provider -     Medication:     dapagliflozin (Farxiga) 5 MG TABS       Dose/Frequency: Take 1 tablet (5 mg total) by mouth daily,     Quantity: 90 tablet    Pharmacy: [id number] - [place] -      Local Pharmacy   Does the patient have enough for 3 days?   [x] Yes   [] No - Send as HP to POD    Mail Away Pharmacy   Does the patient have enough for 10 days?   [] Yes   [] No - Send as HP to POD

## 2025-05-08 NOTE — TELEPHONE ENCOUNTER
Reason for call:   [x] Refill   [] Prior Auth  [] Other:     Office:   [] PCP/Provider -   [x] Specialty/Provider - Cardio    Medication:     atorvastatin (LIPITOR) 10 mg tablet     spironolactone (ALDACTONE) 25 mg tablet     Pharmacy: [id number] - [place]     Local Pharmacy   Does the patient have enough for 3 days?   [x] Yes   [] No - Send as HP to POD    Mail Away Pharmacy   Does the patient have enough for 10 days?   [] Yes   [] No - Send as HP to POD

## 2025-05-09 RX ORDER — DAPAGLIFLOZIN 5 MG/1
5 TABLET, FILM COATED ORAL DAILY
Qty: 90 TABLET | Refills: 1 | Status: SHIPPED | OUTPATIENT
Start: 2025-05-09

## 2025-05-09 RX ORDER — SPIRONOLACTONE 25 MG/1
12.5 TABLET ORAL EVERY OTHER DAY
Qty: 23 TABLET | Refills: 1 | Status: SHIPPED | OUTPATIENT
Start: 2025-05-09 | End: 2026-10-31

## 2025-05-09 RX ORDER — ATORVASTATIN CALCIUM 10 MG/1
10 TABLET, FILM COATED ORAL DAILY
Qty: 90 TABLET | Refills: 1 | Status: SHIPPED | OUTPATIENT
Start: 2025-05-09

## 2025-05-10 DIAGNOSIS — I50.42 CHRONIC COMBINED SYSTOLIC AND DIASTOLIC CONGESTIVE HEART FAILURE (HCC): ICD-10-CM

## 2025-05-10 DIAGNOSIS — I48.20 CHRONIC ATRIAL FIBRILLATION (HCC): ICD-10-CM

## 2025-05-11 RX ORDER — METOPROLOL SUCCINATE 100 MG/1
100 TABLET, EXTENDED RELEASE ORAL 3 TIMES DAILY
Qty: 270 TABLET | Refills: 1 | Status: SHIPPED | OUTPATIENT
Start: 2025-05-11

## 2025-05-12 DIAGNOSIS — I50.41 ACUTE COMBINED SYSTOLIC AND DIASTOLIC CONGESTIVE HEART FAILURE (HCC): ICD-10-CM

## 2025-05-13 RX ORDER — FUROSEMIDE 20 MG/1
20 TABLET ORAL EVERY OTHER DAY
Qty: 45 TABLET | Refills: 1 | Status: SHIPPED | OUTPATIENT
Start: 2025-05-13

## 2025-06-02 ENCOUNTER — TELEPHONE (OUTPATIENT)
Dept: CARDIOLOGY CLINIC | Facility: CLINIC | Age: 85
End: 2025-06-02

## 2025-06-02 NOTE — TELEPHONE ENCOUNTER
Patient neighbor Alverto called and stated she recently started helping Moriah with her medications and she has been taking digoxin 5 times a week regularly not occasionally. Alverto is unsure if the Pt should continue taking the medication this way or if she should go back to 4 times a week. Per Alverto patient has enough med until June 8 and the pharmacy informed her that her next refill is not due until July. Alverto would like to know what she should do and how she can help the patient. Please review and reach out to Alverto, thank you.    Alverto  [phone]

## 2025-06-03 NOTE — TELEPHONE ENCOUNTER
Received call back from Alverto.  Relayed message from Dr. Vargas.      I called pharmacy regarding issue with pt needing refills, but sooner than refill date.  Insurance will not cover until 6/14, so pt will need to pay out of pocket.  For 10 pills, $12.75, for 48 pills $18.  Alverto informed of this.

## 2025-07-23 ENCOUNTER — NURSING HOME VISIT (OUTPATIENT)
Dept: WOUND CARE | Facility: HOSPITAL | Age: 85
End: 2025-07-23
Payer: COMMERCIAL

## 2025-07-23 DIAGNOSIS — R26.2 AMBULATORY DYSFUNCTION: ICD-10-CM

## 2025-07-23 DIAGNOSIS — R32 DERMATITIS ASSOCIATED WITH INCONTINENCE: Primary | ICD-10-CM

## 2025-07-23 DIAGNOSIS — L25.8 DERMATITIS ASSOCIATED WITH INCONTINENCE: Primary | ICD-10-CM

## 2025-07-23 PROCEDURE — 99304 1ST NF CARE SF/LOW MDM 25: CPT | Performed by: NURSE PRACTITIONER

## 2025-07-24 PROBLEM — L25.8 DERMATITIS ASSOCIATED WITH INCONTINENCE: Status: ACTIVE | Noted: 2025-07-24

## 2025-07-24 PROBLEM — R26.2 AMBULATORY DYSFUNCTION: Status: ACTIVE | Noted: 2025-07-24

## 2025-07-24 PROBLEM — R32 DERMATITIS ASSOCIATED WITH INCONTINENCE: Status: ACTIVE | Noted: 2025-07-24

## 2025-07-24 NOTE — PROGRESS NOTES
[facility] WOUND CARE MANAGEMENT   AND HYPERBARIC MEDICINE CENTER       Patient ID: Moriah Arreola is a 85 y.o. female Date of Birth 1940     Location of Service: [facility]    Performed wound round with: Wound team     Chief Complaint : Buttocks    Wound Instructions:  Wound: Buttocks  Discontinue previous wound order  Cleanse the skin/wound bed with soap and water, pat dry  Apply Triad paste to wound bed/skin  Frequency : twice a day and prn for soiling  Offload all wounds  Turn and reposition frequently,   Instruct / Assist with weight shifting in chair  Increase protein intake.  Monitor for any sign of infection or worsening, inform PCP or patient's primary physician in your facility.      Allergies  Patient has no known allergies.      Assessment & Plan:  1. Dermatitis associated with incontinence  Assessment & Plan:  Buttocks  Patient is incontinent of both bowel and bladder, use of diarrhea for is not a contributing factor  Local wound care with Triad paste  Continue to offload  Provide timely continence care  Follow-up next week  2. Ambulatory dysfunction  Assessment & Plan:  On 24/7 restorative care           Subjective:   July 23, 2025.  New consult for wound on the buttocks.  Patient was referred by Senior care team.  Medical problem includes but not limited to type 2 diabetes, hypertension, and history of fall.  Patient was seen with the facility wound team.    Received patient in bed, seems comfortable.  Denies pain.  Patient is incontinent of both bowel and bladder.  Needs assistance with turning or repositioning in bed.            Review of Systems   Constitutional: Negative.    Respiratory: Negative.     Cardiovascular: Negative.    Musculoskeletal:  Positive for gait problem.   Skin:  Positive for wound.       Objective:    Physical Exam  Constitutional:       Appearance: Normal appearance.     Cardiovascular:      Rate and Rhythm: Normal rate.   Pulmonary:      Effort: Pulmonary  "effort is normal.     Skin:     Findings: Lesion present.      Comments: Positive maceration on the buttocks, diffuse, positive rashes, with no obvious sign of infection     Neurological:      Mental Status: She is alert.              Procedures           Patient's care was coordinated with nursing facility staff. Recent vitals, labs and updated medications were reviewed on EMR or chart system of facility. Past Medical, surgical, social, medication and allergy history and patient's previous records were reviewed and updated as appropriate: Most up-to date information is available in the facility EMR where the patient is currently admitted.    Problem List[1]  Past Medical History[2]  Past Surgical History[3]  Social History[4]   Current Medications[5]  Family History[6]           Coordination of Care: Wound team aware of the treatment plan. Facility nurse will provide wound treatment and monitor the wound for any changes.     Patient / Staff education : Patient / Staff was given education on sign of infection and pressure ulcer prevention. Patient/ Staff verbalized understanding     Follow up :  Next week    Voice-recognition software may have been used in the preparation of this document. Occasional wrong word or \"sound-alike\" substitutions may have occurred due to the inherent limitations of voice recognition software. Interpretation should be guided by context.      SUMA Mejia       [1]   Patient Active Problem List  Diagnosis    Acute on chronic combined systolic (congestive) and diastolic (congestive) heart failure (HCC)    Atrial fibrillation (HCC)    Hyperlipemia    Mild protein-calorie malnutrition (HCC)    PAD (peripheral artery disease) (HCC)    Closed wedge compression fracture of thoracic vertebra (HCC)    Stage 3b chronic kidney disease (HCC)    Multiple rib fractures    Hypertensive heart disease with congestive heart failure and chronic kidney disease (HCC)    Long term (current) use of " insulin (HCC)    Dermatitis associated with incontinence    Ambulatory dysfunction   [2]   Past Medical History:  Diagnosis Date    JUANITA (acute kidney injury) (HCC) 2022    Atrial fibrillation (HCC)     Fall 2024    Hypercholesteremia    [3]   Past Surgical History:  Procedure Laterality Date    EMBOLIZATION      HERNIA REPAIR     [4]   Social History  Socioeconomic History    Marital status:    Tobacco Use    Smoking status: Former     Current packs/day: 0.00     Types: Cigarettes     Quit date:      Years since quittin.5     Passive exposure: Past    Smokeless tobacco: Never   Vaping Use    Vaping status: Never Used   Substance and Sexual Activity    Alcohol use: Yes     Alcohol/week: 7.0 standard drinks of alcohol     Types: 7 Standard drinks or equivalent per week     Comment: one glass wine once a week    Drug use: Not Currently    Sexual activity: Not Currently     Social Drivers of Health     Financial Resource Strain: Not At Risk (2025)    Received from [facility]    Financial Insecurity     In the last 12 months did you skip medications to save money?: No     In the last 12 months was there a time when you needed to see a doctor but could not because of cost?: No   Food Insecurity: No Food Insecurity (2025)    Received from [facility]    Food Insecurity     In the last 12 months did you ever eat less than you felt you should because there wasn't enough money for food?: No   Transportation Needs: No Transportation Needs (2025)    Received from [facility]    Transportation Needs     In the last 12 months have you ever had to go without healthcare because you didn't have a way to get there?: No   Social Connections: Socially Integrated (2025)    Received from [facility]    Social Connection     Do you often feel lonely?: No   Housing Stability: Not At Risk (2025)    Received from [facility]    Housing Stability     Are you worried that in the next 2 months you may not have stable housing?: No   [5]   Current Outpatient Medications:     amLODIPine (NORVASC) 5 mg tablet, Take 1 tablet (5 mg total) by mouth daily, Disp: 90 tablet, Rfl: 1    atorvastatin (LIPITOR) 10 mg tablet, Take 1 tablet (10 mg total) by mouth daily, Disp: 90 tablet, Rfl: 1    dapagliflozin (Farxiga) 5 MG TABS, Take 1 tablet (5 mg total) by mouth daily, Disp: 90 tablet, Rfl: 1    digoxin (LANOXIN) 0.125 mg tablet, Take 1 tablet (125 mcg total) by mouth 4 (four) times a week (Patient taking differently: Take 125 mcg by mouth 5 x a week), Disp: 48 tablet, Rfl: 3    furosemide (LASIX) 20 mg tablet, take 1 tablet by mouth every other day, Disp: 45 tablet, Rfl: 1    losartan (COZAAR) 100 MG tablet, Take 1 tablet (100 mg total) by mouth daily, Disp: 90 tablet, Rfl: 1    metoprolol succinate (TOPROL-XL) 100 mg 24 hr tablet, take 1 tablet by mouth three times a day, Disp: 270 tablet, Rfl: 1    rivaroxaban (Xarelto) 15 mg tablet, Take 1 tablet (15 mg total) by mouth every evening, Disp: 90 tablet, Rfl: 1    spironolactone (ALDACTONE) 25 mg tablet, Take 0.5 tablets (12.5 mg total) by mouth every other day, Disp: 23 tablet, Rfl: 1  [6] No family history on file.

## 2025-07-24 NOTE — ASSESSMENT & PLAN NOTE
Buttocks  Patient is incontinent of both bowel and bladder, use of diarrhea for is not a contributing factor  Local wound care with Triad paste  Continue to offload  Provide timely continence care  Follow-up next week